# Patient Record
Sex: FEMALE | Race: WHITE | NOT HISPANIC OR LATINO | Employment: OTHER | URBAN - METROPOLITAN AREA
[De-identification: names, ages, dates, MRNs, and addresses within clinical notes are randomized per-mention and may not be internally consistent; named-entity substitution may affect disease eponyms.]

---

## 2017-01-16 ENCOUNTER — APPOINTMENT (EMERGENCY)
Dept: RADIOLOGY | Facility: HOSPITAL | Age: 62
End: 2017-01-16
Payer: COMMERCIAL

## 2017-01-16 ENCOUNTER — HOSPITAL ENCOUNTER (EMERGENCY)
Facility: HOSPITAL | Age: 62
Discharge: HOME | End: 2017-01-16
Attending: EMERGENCY MEDICINE | Admitting: EMERGENCY MEDICINE
Payer: COMMERCIAL

## 2017-01-16 ENCOUNTER — GENERIC CONVERSION - ENCOUNTER (OUTPATIENT)
Dept: OTHER | Facility: OTHER | Age: 62
End: 2017-01-16

## 2017-01-16 VITALS
RESPIRATION RATE: 14 BRPM | WEIGHT: 138 LBS | TEMPERATURE: 98.1 F | BODY MASS INDEX: 23.56 KG/M2 | DIASTOLIC BLOOD PRESSURE: 70 MMHG | OXYGEN SATURATION: 100 % | HEART RATE: 80 BPM | SYSTOLIC BLOOD PRESSURE: 150 MMHG | HEIGHT: 64 IN

## 2017-01-16 DIAGNOSIS — R00.2 PALPITATIONS: Primary | ICD-10-CM

## 2017-01-16 DIAGNOSIS — R00.2 PALPITATIONS: ICD-10-CM

## 2017-01-16 LAB
ALBUMIN SERPL BCP-MCNC: 4.1 G/DL (ref 3.5–5)
ALP SERPL-CCNC: 67 U/L (ref 46–116)
ALT SERPL W P-5'-P-CCNC: 29 U/L (ref 12–78)
ANION GAP SERPL CALCULATED.3IONS-SCNC: 5 MMOL/L (ref 4–13)
AST SERPL W P-5'-P-CCNC: 16 U/L (ref 5–45)
ATRIAL RATE: 77 BPM
BASOPHILS # BLD AUTO: 0.03 THOUSANDS/ΜL (ref 0–0.1)
BASOPHILS NFR BLD AUTO: 0 % (ref 0–1)
BILIRUB SERPL-MCNC: 0.5 MG/DL (ref 0.2–1)
BUN SERPL-MCNC: 12 MG/DL (ref 5–25)
CALCIUM SERPL-MCNC: 8.9 MG/DL (ref 8.3–10.1)
CHLORIDE SERPL-SCNC: 105 MMOL/L (ref 100–108)
CO2 SERPL-SCNC: 31 MMOL/L (ref 21–32)
CREAT SERPL-MCNC: 0.6 MG/DL (ref 0.6–1.3)
EOSINOPHIL # BLD AUTO: 0.04 THOUSAND/ΜL (ref 0–0.61)
EOSINOPHIL NFR BLD AUTO: 1 % (ref 0–6)
ERYTHROCYTE [DISTWIDTH] IN BLOOD BY AUTOMATED COUNT: 12.9 % (ref 11.6–15.1)
GFR SERPL CREATININE-BSD FRML MDRD: >60 ML/MIN/1.73SQ M
GLUCOSE SERPL-MCNC: 92 MG/DL (ref 65–140)
HCT VFR BLD AUTO: 41.2 % (ref 34.8–46.1)
HGB BLD-MCNC: 13.4 G/DL (ref 11.5–15.4)
LYMPHOCYTES # BLD AUTO: 2.02 THOUSANDS/ΜL (ref 0.6–4.47)
LYMPHOCYTES NFR BLD AUTO: 28 % (ref 14–44)
MAGNESIUM SERPL-MCNC: 1.9 MG/DL (ref 1.6–2.6)
MCH RBC QN AUTO: 30.8 PG (ref 26.8–34.3)
MCHC RBC AUTO-ENTMCNC: 32.5 G/DL (ref 31.4–37.4)
MCV RBC AUTO: 95 FL (ref 82–98)
MONOCYTES # BLD AUTO: 0.5 THOUSAND/ΜL (ref 0.17–1.22)
MONOCYTES NFR BLD AUTO: 7 % (ref 4–12)
NEUTROPHILS # BLD AUTO: 4.61 THOUSANDS/ΜL (ref 1.85–7.62)
NEUTS SEG NFR BLD AUTO: 64 % (ref 43–75)
P AXIS: 74 DEGREES
PLATELET # BLD AUTO: 225 THOUSANDS/UL (ref 149–390)
PMV BLD AUTO: 11.1 FL (ref 8.9–12.7)
POTASSIUM SERPL-SCNC: 4 MMOL/L (ref 3.5–5.3)
PR INTERVAL: 158 MS
PROT SERPL-MCNC: 7.3 G/DL (ref 6.4–8.2)
QRS AXIS: 51 DEGREES
QRSD INTERVAL: 72 MS
QT INTERVAL: 368 MS
QTC INTERVAL: 408 MS
RBC # BLD AUTO: 4.35 MILLION/UL (ref 3.81–5.12)
SODIUM SERPL-SCNC: 141 MMOL/L (ref 136–145)
T WAVE AXIS: 72 DEGREES
TROPONIN I SERPL-MCNC: <0.02 NG/ML
TSH SERPL DL<=0.05 MIU/L-ACNC: 1.91 UIU/ML (ref 0.36–3.74)
VENTRICULAR RATE: 74 BPM
WBC # BLD AUTO: 7.2 THOUSAND/UL (ref 4.31–10.16)

## 2017-01-16 PROCEDURE — 99285 EMERGENCY DEPT VISIT HI MDM: CPT

## 2017-01-16 PROCEDURE — 84443 ASSAY THYROID STIM HORMONE: CPT | Performed by: EMERGENCY MEDICINE

## 2017-01-16 PROCEDURE — 80053 COMPREHEN METABOLIC PANEL: CPT | Performed by: EMERGENCY MEDICINE

## 2017-01-16 PROCEDURE — 85025 COMPLETE CBC W/AUTO DIFF WBC: CPT | Performed by: EMERGENCY MEDICINE

## 2017-01-16 PROCEDURE — 84484 ASSAY OF TROPONIN QUANT: CPT | Performed by: EMERGENCY MEDICINE

## 2017-01-16 PROCEDURE — 71010 HB CHEST X-RAY 1 VIEW FRONTAL (PORTABLE): CPT

## 2017-01-16 PROCEDURE — 83735 ASSAY OF MAGNESIUM: CPT | Performed by: EMERGENCY MEDICINE

## 2017-01-16 PROCEDURE — 36415 COLL VENOUS BLD VENIPUNCTURE: CPT | Performed by: EMERGENCY MEDICINE

## 2017-01-16 PROCEDURE — 93005 ELECTROCARDIOGRAM TRACING: CPT | Performed by: EMERGENCY MEDICINE

## 2017-01-20 ENCOUNTER — HOSPITAL ENCOUNTER (OUTPATIENT)
Dept: NON INVASIVE DIAGNOSTICS | Facility: CLINIC | Age: 62
Discharge: HOME | End: 2017-01-20
Payer: COMMERCIAL

## 2017-01-20 DIAGNOSIS — R00.2 PALPITATIONS: ICD-10-CM

## 2017-01-20 PROCEDURE — 93226 XTRNL ECG REC<48 HR SCAN A/R: CPT

## 2017-01-20 PROCEDURE — 93225 XTRNL ECG REC<48 HRS REC: CPT

## 2017-01-24 ENCOUNTER — GENERIC CONVERSION - ENCOUNTER (OUTPATIENT)
Dept: OTHER | Facility: OTHER | Age: 62
End: 2017-01-24

## 2017-02-22 DIAGNOSIS — M79.606 PAIN OF LOWER EXTREMITY: ICD-10-CM

## 2017-02-22 DIAGNOSIS — Z00.00 ENCOUNTER FOR GENERAL ADULT MEDICAL EXAMINATION WITHOUT ABNORMAL FINDINGS: ICD-10-CM

## 2017-02-22 DIAGNOSIS — R00.2 PALPITATIONS: ICD-10-CM

## 2017-02-22 DIAGNOSIS — R53.83 OTHER FATIGUE: ICD-10-CM

## 2017-03-09 ENCOUNTER — GENERIC CONVERSION - ENCOUNTER (OUTPATIENT)
Dept: OTHER | Facility: OTHER | Age: 62
End: 2017-03-09

## 2017-03-10 ENCOUNTER — ALLSCRIPTS OFFICE VISIT (OUTPATIENT)
Dept: OTHER | Facility: OTHER | Age: 62
End: 2017-03-10

## 2017-03-10 ENCOUNTER — TRANSCRIBE ORDERS (OUTPATIENT)
Dept: ADMINISTRATIVE | Facility: HOSPITAL | Age: 62
End: 2017-03-10

## 2017-03-10 DIAGNOSIS — R00.2 PALPITATIONS: ICD-10-CM

## 2017-03-10 DIAGNOSIS — M79.606 PAIN OF LOWER EXTREMITY, UNSPECIFIED LATERALITY: ICD-10-CM

## 2017-03-10 DIAGNOSIS — R53.81 OTHER MALAISE AND FATIGUE: Primary | ICD-10-CM

## 2017-03-10 DIAGNOSIS — R53.83 OTHER MALAISE AND FATIGUE: Primary | ICD-10-CM

## 2017-03-10 DIAGNOSIS — Z00.00 ROUTINE GENERAL MEDICAL EXAMINATION AT A HEALTH CARE FACILITY: ICD-10-CM

## 2017-03-20 ENCOUNTER — GENERIC CONVERSION - ENCOUNTER (OUTPATIENT)
Dept: OTHER | Facility: OTHER | Age: 62
End: 2017-03-20

## 2017-03-20 LAB
AMBIG ABBREV CMP14 DEFAULT (HISTORICAL): NORMAL
AMBIG ABBREV LP DEFAULT (HISTORICAL): NORMAL

## 2017-03-21 LAB
A/G RATIO (HISTORICAL): 1.7 (ref 1.2–2.2)
ALBUMIN SERPL BCP-MCNC: 4.3 G/DL (ref 3.6–4.8)
ALP SERPL-CCNC: 71 IU/L (ref 39–117)
ALT SERPL W P-5'-P-CCNC: 18 IU/L (ref 0–32)
AST SERPL W P-5'-P-CCNC: 18 IU/L (ref 0–40)
BILIRUB SERPL-MCNC: 0.4 MG/DL (ref 0–1.2)
BUN SERPL-MCNC: 13 MG/DL (ref 8–27)
BUN/CREA RATIO (HISTORICAL): 20 (ref 11–26)
CALCIUM SERPL-MCNC: 9.8 MG/DL (ref 8.7–10.3)
CHLORIDE SERPL-SCNC: 102 MMOL/L (ref 96–106)
CHOLEST SERPL-MCNC: 212 MG/DL (ref 100–199)
CO2 SERPL-SCNC: 27 MMOL/L (ref 18–29)
CREAT SERPL-MCNC: 0.65 MG/DL (ref 0.57–1)
EGFR AFRICAN AMERICAN (HISTORICAL): 110 ML/MIN/1.73
EGFR-AMERICAN CALC (HISTORICAL): 95 ML/MIN/1.73
GLUCOSE SERPL-MCNC: 87 MG/DL (ref 65–99)
HDLC SERPL-MCNC: 57 MG/DL
LDLC SERPL CALC-MCNC: 136 MG/DL (ref 0–99)
POTASSIUM SERPL-SCNC: 4.5 MMOL/L (ref 3.5–5.2)
SODIUM SERPL-SCNC: 143 MMOL/L (ref 134–144)
TOT. GLOBULIN, SERUM (HISTORICAL): 2.5 G/DL (ref 1.5–4.5)
TOTAL PROTEIN (HISTORICAL): 6.8 G/DL (ref 6–8.5)
TRIGL SERPL-MCNC: 97 MG/DL (ref 0–149)
VLDLC SERPL CALC-MCNC: 19 MG/DL (ref 5–40)

## 2017-03-23 ENCOUNTER — HOSPITAL ENCOUNTER (OUTPATIENT)
Dept: NON INVASIVE DIAGNOSTICS | Facility: CLINIC | Age: 62
Discharge: HOME | End: 2017-03-23
Payer: COMMERCIAL

## 2017-03-23 ENCOUNTER — GENERIC CONVERSION - ENCOUNTER (OUTPATIENT)
Dept: OTHER | Facility: OTHER | Age: 62
End: 2017-03-23

## 2017-03-23 DIAGNOSIS — M79.606 PAIN OF LOWER EXTREMITY: ICD-10-CM

## 2017-03-23 DIAGNOSIS — R53.83 OTHER FATIGUE: ICD-10-CM

## 2017-03-23 DIAGNOSIS — Z00.00 ENCOUNTER FOR GENERAL ADULT MEDICAL EXAMINATION WITHOUT ABNORMAL FINDINGS: ICD-10-CM

## 2017-03-23 DIAGNOSIS — R00.2 PALPITATIONS: ICD-10-CM

## 2017-03-23 PROCEDURE — 93306 TTE W/DOPPLER COMPLETE: CPT

## 2017-03-23 PROCEDURE — 93923 UPR/LXTR ART STDY 3+ LVLS: CPT

## 2017-04-13 ENCOUNTER — ALLSCRIPTS OFFICE VISIT (OUTPATIENT)
Dept: OTHER | Facility: OTHER | Age: 62
End: 2017-04-13

## 2017-04-13 ENCOUNTER — GENERIC CONVERSION - ENCOUNTER (OUTPATIENT)
Dept: OTHER | Facility: OTHER | Age: 62
End: 2017-04-13

## 2017-04-20 LAB
ADEQUACY: (HISTORICAL): NORMAL
CLINICIAN PROVIDIED ICD 9 OR 10 (HISTORICAL): NORMAL
COMMENT (HISTORICAL): NORMAL
DIAGNOSIS (HISTORICAL): NORMAL
HPV HIGH RISK (HISTORICAL): NEGATIVE
NOTE: (HISTORICAL): NORMAL
PERFORMED BY (HISTORICAL): NORMAL
QC REVIEWED BY (HISTORICAL): NORMAL

## 2017-04-21 ENCOUNTER — GENERIC CONVERSION - ENCOUNTER (OUTPATIENT)
Dept: OTHER | Facility: OTHER | Age: 62
End: 2017-04-21

## 2017-06-15 ENCOUNTER — GENERIC CONVERSION - ENCOUNTER (OUTPATIENT)
Dept: OTHER | Facility: OTHER | Age: 62
End: 2017-06-15

## 2017-08-22 DIAGNOSIS — Z12.31 ENCOUNTER FOR SCREENING MAMMOGRAM FOR MALIGNANT NEOPLASM OF BREAST: ICD-10-CM

## 2017-08-25 ENCOUNTER — GENERIC CONVERSION - ENCOUNTER (OUTPATIENT)
Dept: OTHER | Facility: OTHER | Age: 62
End: 2017-08-25

## 2017-08-31 ENCOUNTER — GENERIC CONVERSION - ENCOUNTER (OUTPATIENT)
Dept: OTHER | Facility: OTHER | Age: 62
End: 2017-08-31

## 2018-01-10 NOTE — RESULT NOTES
Verified Results  HOLTER MONITOR - 24 HOUR [id number] 02:22PM Robyn Ya Order Number: [id number]    - Patient Instructions: To schedule this appointment, please contact Central Scheduling at [phone]  For Jean Carlos Alatorre, please call [phone]  Test Name Result Flag Reference   HOLTER MONITOR - 24 HOUR (Report)     PT NAME: Alesia Romero   : 1955 AGE: 57 y o  GENDER: female   MRN: [id number]  PROCEDURE: Holter monitor - 24 hour         INDICATIONS: Palpitations       FINDINGS:   1  A 24 hour holter monitor demonstrated normal sinus rhythm with an average rate of 75 BPM; a minimum rate of 46 BPM; and a maximum rate of 133 BPM    2  There were no ventricular ectopic beats  3  There were 85 supraventricular ectopic beats, the majority of which were premature atrial contractions  There were 3 atrial couplets, but no runs of supraventricular tachycardia  4  The longest R-R interval was 1 6 seconds  5  The patient kept a diary during holter monitor  It demonstrated frequent episodes of palpitations and fluttering that did not correlate to any dysrhythmia  1  Normal 24 hour holter monitor  2  Patient in normal sinus rhythm throughout holter monitoring  3  No premature ventricular contractions  4  Occasional premature atrial contractions with no supraventricular tachycardia  5  No significant pauses  6  Symptoms on diary did not correlate to any dysrhythmia

## 2018-01-11 NOTE — RESULT NOTES
Verified Results  (1924 [facility]) Pap IG, HPV-hr [id number] 12:00AM Elizabeth Larkin   ID-[id number]     Test Name Result Flag Reference   DIAGNOSIS: Comment     NEGATIVE FOR INTRAEPITHELIAL LESION AND MALIGNANCY  THIS SPECIMEN WAS RESCREENED AS PART OF OUR  PROGRAM    Specimen adequacy: Comment     Satisfactory for evaluation  Endocervical and/or squamous metaplastic  cells (endocervical component) are present  Clinician provided ICD10: Comment     Z01 419   Performed by: Alvin Flores, Cytotechnologist (ASCP)   QC reviewed by: Carley Miranda, Supervisory Cytotechnologist       Note: Comment     The Pap smear is a screening test designed to aid in the detection of  premalignant and malignant conditions of the uterine cervix  It is not a  diagnostic procedure and should not be used as the sole means of detecting  cervical cancer  Both false-positive and false-negative reports do occur  HPV, high-risk Negative  Negative   This high-risk HPV test detects thirteen high-risk types  (16/18/31/33/35/39/45/51/52/56/58/59/68) without differentiation

## 2018-01-11 NOTE — PROGRESS NOTES
Assessment    1  Encounter for cervical Pap smear with pelvic exam (V76 2,V72 31) (Z01 419)   2  Screening mammogram, encounter for (V76 12) (Z12 31)   3  Benign colon polyp (211 3) (K63 5)    Plan  Benign colon polyp    · COLONOSCOPY; Status:Active; Requested for:13Apr2017;   Encounter for cervical Pap smear with pelvic exam, Encounter for routine gynecological  examination with Papanicolaou smear of cervix, Periodic health assessment, Pap and  pelvic    · (1) CBC/PLT/DIFF; Status:Active; Requested for:[id number];   Encounter for routine gynecological examination with Papanicolaou smear of cervix    · (1) THIN PREP PAP WITH IMAGING; Status:Active; Requested for:13Apr2017; Maturation index required? : No  HPV? : Regardless of Interpretation  Encounter for routine gynecological examination with Papanicolaou smear of cervix,  Periodic health assessment, Pap and pelvic    · (1) VITAMIN D 25-HYDROXY; Status:Active; Requested for:13Feb2018; Health Maintenance    · (1) COMPREHENSIVE METABOLIC PANEL; Status:Active; Requested for:[id number];   Periodic health assessment, Pap and pelvic    · (1) LIPID PANEL, FASTING; Status:Active; Requested for:13Feb2018;   Periodic health assessment, Pap and pelvic, Screening mammogram, encounter for    · (1) TSH; Status:Active; Requested for:13Feb2018;   Screening mammogram, encounter for    · * MAMMO SCREENING BILATERAL W CAD; Status:Hold For - Scheduling; Requested  for:[id number];     Discussion/Summary  healthy adult female Currently, she eats a healthy diet and eats an adequate diet  cervical cancer screening is current Pap test with reflex HPV testing was done today Breast cancer screening: mammogram has been ordered  Colorectal cancer screening: colorectal cancer screening is current  Advice and education were given regarding weight bearing exercise, calcium supplements and vitamin D supplements  Patient discussion: discussed with the patient       LABS STABLE  MAMMO DUE 8/2017  PAP DONE TODAY- IF NEG OBATIN 3 YEARS   LABS IN 1 YEAR   COLONO DUE - ALST ONE 2008 - P[OLYP REMOVED- PT TO SCHEDULE  Chief Complaint  PATIENT HERE FOR ANNUAL PAP   SHE HAD SOME PALPITATIONS AND WENT TO ER- HAD FOLLOW UP WITH CARDIO- STABLE- NO FURTHER SYMTOMS  SHE IS DOING WELL  LIPIDS STABLE;      History of Present Illness  HM, Adult Female: The patient is being seen for a health maintenance and gynecology evaluation  Social History: Household members include spouse  She is   Work status: working full time  The patient has never smoked cigarettes  She reports never drinking alcohol  The patient has no concerns about alcohol abuse  General Health: The patient's health since the last visit is described as good  She has regular dental visits  She complains of vision problems  Vision care includes wearing glasses  Immunizations status: not up to date  Lifestyle:  She consumes a diverse and healthy diet  She does not have any weight concerns  She exercises regularly  She does not use tobacco  She denies alcohol use  Reproductive health: the patient is postmenopausal    Screening:   HPI: PATIENT HERE FOR ANNUAL PAP      Review of Systems    Constitutional: No fever, no chills, feels well, no tiredness, no recent weight gain or weight loss  Eyes: eyesight problems and WEARS GLASSES, but No complaints of eye pain, no red eyes, no eyesight problems, no discharge, no dry eyes, no itching of eyes and as noted in HPI    ENT: no complaints of earache, no loss of hearing, no nose bleeds, no nasal discharge, no sore throat, no hoarseness  Cardiovascular: No complaints of slow heart rate, no fast heart rate, no chest pain, no palpitations, no leg claudication, no lower extremity edema  Respiratory: No complaints of shortness of breath, no wheezing, no cough, no SOB on exertion, no orthopnea, no PND     Gastrointestinal: No complaints of abdominal pain, no constipation, no nausea or vomiting, no diarrhea, no bloody stools  Genitourinary: No complaints of dysuria, no incontinence, no pelvic pain, no dysmenorrhea, no vaginal discharge or bleeding  Musculoskeletal: No complaints of arthralgias, no myalgias, no joint swelling or stiffness, no limb pain or swelling  Integumentary: MULTIPLE NEVI, but No complaints of skin rash or lesions, no itching, no skin wounds, no breast pain or lump, as noted in HPI and no skin lesions  Neurological: No complaints of headache, no confusion, no convulsions, no numbness, no dizziness or fainting, no tingling, no limb weakness, no difficulty walking, no headache, no confusion and no convulsions  Psychiatric: Not suicidal, no sleep disturbance, no anxiety or depression, no change in personality, no emotional problems and no sleep disturbances  Endocrine: No complaints of proptosis, no hot flashes, no muscle weakness, no deepening of the voice, no feelings of weakness, no proptosis, no muscle weakness, no hot flashes and no deepening of the voice  Hematologic/Lymphatic: No complaints of swollen glands, no swollen glands in the neck, does not bleed easily, does not bruise easily, no tendency for easy bleeding and no tendency for easy bruising  ROS reviewed  Active Problems    1  Encounter for cervical Pap smear with pelvic exam (V76 2,V72 31) (Z01 419)   2  Encounter for routine gynecological examination with Papanicolaou smear of cervix   (V72 31,V76 2) (Z01 419)   3   Periodic health assessment, Pap and pelvic (V76 2,V72 31) (Z01 419)    Past Medical History    · Adjustment disorder with anxious mood (309 24) (F43 22)   · History of Degeneration of cervical intervertebral disc (722 4) (M50 90)   · History of dysfunctional uterine bleeding (V13 29) (Z87 42)   · History of essential hypertension (V12 59) (Z86 79)   · Menopause (627 2)   · History of PAC (premature atrial contraction) (427 61) (I49 1)   · History of Polyp of sigmoid colon (211 3) (D12 5)    Surgical History    · History of Tonsillectomy    Family History  Mother    · Family history of Coronary artery disease   · Family history of arthritis (V17 7) (Z82 61)   · Family history of atrial fibrillation (V17 49) (Z82 49)   · Family history of cerebrovascular accident (CVA) (V17 1) (Z82 3)   · Family history of diabetes mellitus (V18 0) (Z83 3)   · FH: colon cancer (V16 0) (Z80 0)  Father    · Family history of malignant neoplasm (V16 9) (Z80 9)   · FH: colon cancer (V16 0) (Z80 0)  Brother    · Family history of Coronary artery disease    Social History    · Exercise: Yoga   · Has 2 children   ·    · Never A Smoker   · Never smoked cigarettes (V49 89) (Z78 9)   · Pets in the home   · Social alcohol use (Z78 9)    Current Meds   1  Biotin CAPS; Therapy: (Recorded:13Apr2017) to Recorded   2  BL Vitamin B-12 TABS; Therapy: (Recorded:13Apr2017) to Recorded   3  Ferrous Sulfate 325 (65 Fe) MG Oral Tablet; Therapy: (Recorded:13Apr2017) to Recorded   4  Vitamin D3 1000 UNIT Oral Capsule; take 1 capsule daily; Therapy: (Recorded:84Whu3920) to Recorded    Allergies    1  No Known Drug Allergies    Vitals   Recorded: 13Apr2017 02:00PM   Temperature 97 8 F   Heart Rate 88   Respiration 16   Height 5 ft 3 in   Weight 136 lb    BMI Calculated 24 09   BSA Calculated 1 64   LMP [id number]     Physical Exam    Constitutional   General appearance: No acute distress, well appearing and well nourished  well developed, appears healthy, well nourished, within normal limits of ideal weight, well hydrated and appearance reflects stated age  Eyes   Conjunctiva and lids: No swelling, erythema or discharge  Pupils and irises: Equal, round, reactive to light  EOMI; PERRLA  Ears, Nose, Mouth, and Throat   External inspection of ears and nose: Normal     Otoscopic examination: Tympanic membranes translucent with normal light reflex  Canals patent without erythema      Hearing: Normal     Nasal mucosa, septum, and turbinates: Normal without edema or erythema  Lips, teeth, and gums: Normal, good dentition  Oropharynx: Normal with no erythema, edema, exudate or lesions  Inspection of the oropharynx showed fully visible tonsils, uvula and soft palate (Mallampati class 1)  The palate examination showed no abnormalities  The tongue was normal  The tonsils were normal    Neck   Neck: Supple, symmetric, trachea midline, no masses  Thyroid: Normal, no thyromegaly  Pulmonary   Respiratory effort: No increased work of breathing or signs of respiratory distress  Percussion of chest: Normal     Palpation of chest: Normal     Auscultation of lungs: Clear to auscultation  Cardiovascular   Palpation of heart: Normal PMI, no thrills  Auscultation of heart: Normal rate and rhythm, normal S1 and S2, no murmurs  The heart rate was normal  The rhythm was regular  Heart sounds: normal S1, normal S2, no S3 and no S4  no murmurs were heard  Carotid pulses: 2+ bilaterally  Abdominal aorta: Normal     Femoral pulses: 2+ bilaterally  Pedal pulses: 2+ bilaterally  Examination of extremities for edema and/or varicosities: Normal     Chest   Breasts: Normal, no dimpling or skin changes appreciated  Palpation of breasts and axillae: Normal, no masses palpated  Abdomen   Abdomen: Non-tender, no masses  Liver and spleen: No hepatomegaly or splenomegaly  Genitourinary   External genitalia and vagina: Normal, no lesions appreciated  Urethra: Normal, no discharge  Bladder: Abnormal   Palpation of the bladder revealed no abnormalities and no tenderness  GRADE 2 PROLAPSE  Cervix: Normal, no lesions  Uterus: Normal size, no tenderness, no masses  The uterus was normal, in a normal position and nontender  Adnexa/Parametria: Normal, no masses or tenderness  Lymphatic   Palpation of lymph nodes in neck: No lymphadenopathy  no posterior cervical node enlargement     Palpation of lymph nodes in axillae: No lymphadenopathy  no node enlargement  Palpation of lymph nodes in groin: No lymphadenopathy  Musculoskeletal   Gait and station: Normal     Digits and nails: Normal without clubbing or cyanosis  Joints, bones, and muscles: Normal     Range of motion: Normal     Stability: Normal     Muscle strength/tone: Normal     Skin   Skin and subcutaneous tissue: Normal without rashes or lesions  Palpation of skin and subcutaneous tissue: Normal turgor  Neurologic   Cranial nerves: Cranial nerves II-XII intact  Reflexes: 2+ and symmetric  Sensation: No sensory loss  Psychiatric   Judgment and insight: Normal     Orientation to person, place, and time: Normal     Recent and remote memory: Intact  Mood and affect: Normal        Results/Data  [facility] Risk [id number] 02:23PM Chevis Flock     Test Name Result Flag Reference   [facility] - Ten Year Risk of CHD 3 22 %     Sex: Female  Ethnicity: White/  Age: 57  Total Cholesterol: 212 mg/dL  HDL Cholesterol: 57 mg/dL  Systolic Blood Pressure: 163 mmHg  Blood Pressure Medication: No  Diabetes: No  Smoking Status: No   [facility] - Comparative Ten Year Risk of CHD 4 77 %       PHQ-2 Adult Depression Screening 13Apr2017 02:10PM Wade Felix     Test Name Result Flag Reference   PHQ-2 Adult Depression Score 0     Over the last two weeks, how often have you been bothered by any of the following problems? Little interest or pleasure in doing things: Not at all - 0  Feeling down, depressed, or hopeless: Not at all - 0   PHQ-2 Adult Depression Screening Negative         Future Appointments    Date/Time Provider Specialty Site   04/18/2018 04:00 PM [address], Antone Cowden, DO Internal Medicine [place] [address]   Electronically signed by : Serafin Maxwell DO;  Apr 13 2017  4:00PM EST                       (Author)

## 2018-01-12 VITALS
WEIGHT: 136 LBS | RESPIRATION RATE: 16 BRPM | HEIGHT: 63 IN | TEMPERATURE: 97.8 F | BODY MASS INDEX: 24.1 KG/M2 | HEART RATE: 88 BPM

## 2018-01-12 NOTE — RESULT NOTES
Verified Results  [facility]) Timmy Steen CMP14 Default [id number] 06:38AM Trudy Katharina     Test Name Result Flag Reference   Timmy Steen CMP14 Default Comment     A hand-written panel/profile was received from your office  In  accordance with the [facility] Ambiguous Test Code Policy dated July 7591, we have completed your order by using the closest currently  or formerly recognized AMA panel  We have assigned Comprehensive  Metabolic Panel (14), Test Code #002536 to this request   If this  is not the testing you wished to receive on this specimen, please  contact the [facility] Client Inquiry/Technical Services Department  to clarify the test order  We appreciate your business

## 2018-01-15 NOTE — RESULT NOTES
Message   Labs are all stable!! Verified Results  (1) COMPREHENSIVE METABOLIC PANEL [id number] 62:97PN Erinn [facility]   A courtesy copy of this report has been sent to  [facility], the patient  Test Name Result Flag Reference   Glucose, Serum 87 mg/dL  65-99   BUN 13 mg/dL  8-27   Creatinine, Serum 0 65 mg/dL  0 57-1 00   eGFR If NonAfricn Am 95 mL/min/1 73  >59   eGFR If Africn Am 110 mL/min/1 73  >59   BUN/Creatinine Ratio 20  11-26   Sodium, Serum 143 mmol/L  134-144   Potassium, Serum 4 5 mmol/L  3 5-5 2   Chloride, Serum 102 mmol/L     Carbon Dioxide, Total 27 mmol/L  18-29   Calcium, Serum 9 8 mg/dL  8 7-10 3   Protein, Total, Serum 6 8 g/dL  6 0-8 5   Albumin, Serum 4 3 g/dL  3 6-4 8   Globulin, Total 2 5 g/dL  1 5-4 5   A/G Ratio 1 7  1 2-2 2   **Please note reference interval change**   Bilirubin, Total 0 4 mg/dL  0 0-1 2   Alkaline Phosphatase, S 71 IU/L     AST (SGOT) 18 IU/L  0-40   ALT (SGPT) 18 IU/L  0-32     (LC) Lipid Panel [id number] 06:55AM Carloyn Chamber     Test Name Result Flag Reference   Cholesterol, Total 212 mg/dL H 100-199   Triglycerides 97 mg/dL  0-149   HDL Cholesterol 57 mg/dL  >39   VLDL Cholesterol Thomas 19 mg/dL  5-40   LDL Cholesterol Calc 136 mg/dL H 0-99     [facility]) Priyanka Pretty CMP14 Default [id number] 06:55AM Carloyn Chamber     Test Name Result Flag Reference   Priyanka Pretty CMP14 Default Comment     A hand-written panel/profile was received from your office  In  accordance with the [facility] Ambiguous Test Code Policy dated July 8352, we have completed your order by using the closest currently  or formerly recognized AMA panel  We have assigned Comprehensive  Metabolic Panel (14), Test Code #907874 to this request   If this  is not the testing you wished to receive on this specimen, please  contact the [facility] Client Inquiry/Technical Services Department  to clarify the test order  We appreciate your business       ([address] [place] [address]) [facility] Default [id number] 06:55AM Maritza Sanchez     Test Name Result Flag Reference   [facility] Default Comment     A hand-written panel/profile was received from your office  In  accordance with the [facility] Ambiguous Test Code Policy dated July 8950, we have completed your order by using the closest currently  or formerly recognized AMA panel  We have assigned Lipid Panel,  Test Code #937243 to this request  If this is not the testing you  wished to receive on this specimen, please contact the [address] [place] [address]  Client Inquiry/Technical Services Department to clarify the test  order  We appreciate your business

## 2018-01-16 NOTE — CONSULTS
Assessment    1  Palpitations (785 1) (R00 2)   2  Encounter for preventive health examination (V70 0) (Z00 00)   3  Fatigue (780 79) (R53 83)   4  Leg pain (729 5) (M79 606)    Plan  Health Maintenance    · EKG/ECG- POC; Status:Complete;   Done: [id number]   Perform: In Office; Due:10Mar2018; Last Updated Barb Le; 3/10/2017 10:11:51 AM;Ordered;  For:Health Maintenance; Ordered By:Adair Sanchez;  Health Maintenance, Fatigue, Leg pain, Palpitations    · ECHO COMPLETE WITH CONTRAST IF INDICATED; Status:Need Information - Financial  Authorization; Requested for:10Mar2017;    Perform:[facility]; Due:10Mar2018; Last Updated By:CHARBEL Garcia; 3/10/2017 10:42:26 AM;Ordered;  For:Health Maintenance, Fatigue, Leg pain, Palpitations; Ordered By:Adair Sanchez;   · VAS EMMANUEL & WAVEFORM ANALYSIS, MULTIPLE LEVELS; Status:Active; Requested  for:10Mar2017;    Perform:[facility]; Due:10Mar2018; Last Updated By:CHARBEL Garcia; 3/10/2017 10:42:26 AM;Ordered;  For:Health Maintenance, Fatigue, Leg pain, Palpitations; Ordered By:Adair Sanchez;   · Follow-up visit in 6 months Evaluation and Treatment  Follow-up  Status: Complete   Done: 40MZO0856   Ordered; For: Health Maintenance, Fatigue, Leg pain, Palpitations; Ordered By: Jese Mittal Performed:  Due: 26LFM4351; Last Updated By: Anderson Dover; 3/10/2017 10:35:38 AM    Discussion/Summary    The palpitations seem to be occurring infrequently  I've recommended that she increase her hydration  I told her if they come back to call me and I will give her an event recorder to keep at home for a couple weeks  I would like to start with a transthoracic echocardiogram to rule out any underlying structural heart disease  I've also ordered an ankle-brachial index due to the complaints of loss of hair in the legs and lower extremity cramping  We'll rule out any vascular component  Blood pressure is well controlled   Reviewed her lipid panel from last year she does not need statin medications based on those numbers  She will be having repeat blood work this year and we'll forward me the results  Chief Complaint  Patient here today for Cardiac Consult  Patient c/o Occ Palpations  EKG today  History of Present Illness  Cardiology HPI Free Text Note Form [facility]: Mrs Lonzell Goldmann Is a very pleasant 14-year-old female with no significant past medical history who presents today for new patient consultation on behalf of Dr Krishna Marin for a complaint of palpitations  She has had on and off palpitations for at least a year  She had an episode about 6 weeks ago where she felt a rapid fluttering in her chest  She was evaluated in the emergency arm and no abnormalities were found  She also had a Holter monitor which showed occasional PACs but nothing correlated with her symptoms  She has not had any further palpitations since that event  There is been no syncope  She is a good functional capacity for age with no exertional symptoms  There's been no chest pain, shortness of breath, lower extremity edema, PND, orthopnea  She does not drink much alcohol and there is no caffeine use  She probably does not drink enough fluids today for general hydration  There is also a complaint of loss of hair in the lower legs and a family history of circulation problems  She has had some occasional cramping in her lower legs  Review of Systems      Cardiac: palpitations present , but No complaints of chest pain, no palpitations, no fainting  Skin: No complaints of nonhealing sores or skin rash  Genitourinary: No complaints of recurrent urinary tract infections, frequent urination at night, difficult urination, blood in urine, kidney stones, loss of bladder control, kidney problems, denies any birth control or hormone replacement, is not post menopausal, not currently pregnant     Psychological: No complaints of feeling depressed, anxiety, panic attacks, or difficulty concentrating  General: No complaints of trouble sleeping, lack of energy, fatigue, appetite changes, weight changes, fever, frequent infections, or night sweats  Respiratory: No complaints of shortness of breath, cough with sputum, or wheezing  HEENT: No complaints of serious problems, hearing problems, nose problems, throat problems, or snoring  Gastrointestinal: No complaints of liver problems, nausea, vomiting, heartburn, constipation, bloody stools, diarrhea, problems swallowing, adbominal pain, or rectal bleeding  Hematologic: No complaints of bleeding disorders, anemia, blood clots, or excessive brusing  Neurological: No complaints of numbness, tingling, dizziness, weakness, seizures, headaches, syncope or fainting, AM fatigue, daytime sleepiness, no witnessed apnea episodes  Musculoskeletal: No complaints of arthritis, back pain, or painfull swelling  Active Problems    1  Cervical cancer screening (V76 2) (Z12 4)   2  Encounter for cervical Pap smear with pelvic exam (V76 2,V72 31) (Z01 419)   3  Encounter for screening mammogram for breast cancer (V76 12) (Z12 31)   4  Fatigue (780 79) (R53 83)   5  Left elbow pain (719 42) (M25 522)   6  Palpitations (785 1) (R00 2)   7  Periodic health assessment, Pap and pelvic (V76 2,V72 31) (Z01 419)   8  Wellness examination (V70 0) (Z00 00)    Past Medical History    · Adjustment disorder with anxious mood (309 24) (F43 22)   · History of Degeneration of cervical intervertebral disc (722 4) (M50 90)   · History of dysfunctional uterine bleeding (V13 29) (Z87 42)   · History of essential hypertension (V12 59) (Z86 79)   · Menopause (627 2)   · History of PAC (premature atrial contraction) (427 61) (I49 1)   · History of Polyp of sigmoid colon (211 3) (D12 5)    The active problems and past medical history were reviewed and updated today        Surgical History    · History of Tonsillectomy    The surgical history was reviewed and updated today  Family History  Mother    · Family history of atrial fibrillation (V17 49) (Z82 49)   · FH: colon cancer (V16 0) (Z80 0)  Father    · FH: colon cancer (V16 0) (Z80 0)  Family History Reviewed: The family history was reviewed and updated today  Social History    · Never A Smoker  The social history was reviewed and updated today  Current Meds   1  Naproxen  MG Oral Tablet Delayed Release; TAKE 1 TABLET EVERY 12 HOURS   DAILY; Therapy: 77CHM4220 to (Evaluate:95Cfj2497)  Requested for: 39Gip7627; Last   Rx:[id number] Ordered   2  Vitamin D3 1000 UNIT Oral Capsule; take 1 capsule daily; Therapy: (Recorded:68Nof4748) to Recorded    Allergies    1  No Known Drug Allergies    Vitals  Signs    Heart Rate: 72  Systolic: 589, LUE, Sitting  Diastolic: 64, LUE, Sitting  Height: 5 ft 3 in  Weight: 135 lb 4 oz  BMI Calculated: 23 96  BSA Calculated: 1 64    Physical Exam    Constitutional   General appearance: No acute distress, well appearing and well nourished  Eyes   Conjunctiva and Sclera examination: Conjunctiva pink, sclera anicteric  Ears, Nose, Mouth, and Throat - Oropharynx: Clear, nares are clear, mucous membranes are moist    Neck   Neck and thyroid: Normal, supple, trachea midline, no thyromegaly  Pulmonary   Respiratory effort: No increased work of breathing or signs of respiratory distress  Auscultation of lungs: Clear to auscultation, no rales, no rhonchi, no wheezing, good air movement  Cardiovascular   Auscultation of heart: Normal rate and rhythm, normal S1 and S2, no murmurs  Carotid pulses: Normal, 2+ bilaterally  Peripheral vascular exam: Normal pulses throughout, no tenderness, erythema or swelling  Pedal pulses: Normal, 2+ bilaterally  Examination of extremities for edema and/or varicosities: Normal     Abdomen   Abdomen: Non-tender and no distention  Liver and spleen: No hepatomegaly or splenomegaly      Musculoskeletal Gait and station: Normal gait  Digits and nails: Normal without clubbing or cyanosis  Inspection/palpation of joints, bones, and muscles: Normal, ROM normal     Skin - Skin and subcutaneous tissue: Normal without rashes or lesions  Skin is warm and well perfused, normal turgor  Neurologic - Cranial nerves: II - XII intact  Speech: Normal     Psychiatric - Orientation to person, place, and time: Normal  Mood and affect: Normal       Results/Data  Normal sinus rhythm left atrial enlargement      Future Appointments    Date/Time Provider Specialty Site   04/13/2017 02:00 PM Neo Carroll DO Internal Medicine [address]     End of Encounter Meds    1  Vitamin D3 1000 UNIT Oral Capsule; take 1 capsule daily; Therapy: (Recorded:33Pze2595) to Recorded    2  Naproxen  MG Oral Tablet Delayed Release; TAKE 1 TABLET EVERY 12 HOURS   DAILY;    Therapy: 74PLV6044 to (Evaluate:14Ool4980)  Requested for: 47Uqk3212; Last   Rx:[id number] Ordered    Signatures   Electronically signed by : Jagdish Rodriguez DO; Mar 10 2017 11:32AM EST                       (Author)

## 2018-01-22 VITALS
BODY MASS INDEX: 23.96 KG/M2 | DIASTOLIC BLOOD PRESSURE: 64 MMHG | HEIGHT: 63 IN | SYSTOLIC BLOOD PRESSURE: 124 MMHG | HEART RATE: 72 BPM | WEIGHT: 135.25 LBS

## 2018-02-13 DIAGNOSIS — Z00.00 ENCOUNTER FOR GENERAL ADULT MEDICAL EXAMINATION WITHOUT ABNORMAL FINDINGS: ICD-10-CM

## 2018-02-13 DIAGNOSIS — Z12.31 ENCOUNTER FOR SCREENING MAMMOGRAM FOR MALIGNANT NEOPLASM OF BREAST: ICD-10-CM

## 2018-02-13 DIAGNOSIS — Z01.419 ENCOUNTER FOR GYNECOLOGICAL EXAMINATION WITHOUT ABNORMAL FINDING: ICD-10-CM

## 2018-04-05 LAB
25(OH)D3 SERPL-MCNC: 48 NG/ML (ref 30–100)
ALBUMIN SERPL-MCNC: 4.6 G/DL (ref 3.6–5.1)
ALBUMIN/GLOB SERPL: 1.8 (CALC) (ref 1–2.5)
ALP SERPL-CCNC: 80 U/L (ref 33–130)
ALT SERPL-CCNC: 17 U/L (ref 6–29)
AST SERPL-CCNC: 17 U/L (ref 10–35)
BASOPHILS # BLD AUTO: 46 CELLS/UL (ref 0–200)
BASOPHILS NFR BLD AUTO: 0.5 %
BILIRUB SERPL-MCNC: 0.8 MG/DL (ref 0.2–1.2)
BUN SERPL-MCNC: 11 MG/DL (ref 7–25)
BUN/CREAT SERPL: NORMAL (CALC) (ref 6–22)
CALCIUM SERPL-MCNC: 9.5 MG/DL (ref 8.6–10.4)
CHLORIDE SERPL-SCNC: 101 MMOL/L (ref 98–110)
CHOLEST SERPL-MCNC: 239 MG/DL
CHOLEST/HDLC SERPL: 4.5 (CALC)
CO2 SERPL-SCNC: 30 MMOL/L (ref 20–31)
CREAT SERPL-MCNC: 0.72 MG/DL (ref 0.5–0.99)
EOSINOPHIL # BLD AUTO: 137 CELLS/UL (ref 15–500)
EOSINOPHIL NFR BLD AUTO: 1.5 %
ERYTHROCYTE [DISTWIDTH] IN BLOOD BY AUTOMATED COUNT: 12.9 % (ref 11–15)
GLOBULIN SER CALC-MCNC: 2.5 G/DL (CALC) (ref 1.9–3.7)
GLUCOSE SERPL-MCNC: 94 MG/DL (ref 65–99)
HCT VFR BLD AUTO: 43.8 % (ref 35–45)
HDLC SERPL-MCNC: 53 MG/DL
HGB BLD-MCNC: 14.6 G/DL (ref 11.7–15.5)
LDLC SERPL CALC-MCNC: 163 MG/DL (CALC)
LYMPHOCYTES # BLD AUTO: 2175 CELLS/UL (ref 850–3900)
LYMPHOCYTES NFR BLD AUTO: 23.9 %
MCH RBC QN AUTO: 31.8 PG (ref 27–33)
MCHC RBC AUTO-ENTMCNC: 33.3 G/DL (ref 32–36)
MCV RBC AUTO: 95.4 FL (ref 80–100)
MONOCYTES # BLD AUTO: 764 CELLS/UL (ref 200–950)
MONOCYTES NFR BLD AUTO: 8.4 %
NEUTROPHILS # BLD AUTO: 5979 CELLS/UL (ref 1500–7800)
NEUTROPHILS NFR BLD AUTO: 65.7 %
NONHDLC SERPL-MCNC: 186 MG/DL (CALC)
PLATELET # BLD AUTO: 247 THOUSAND/UL (ref 140–400)
PMV BLD REES-ECKER: 11.6 FL (ref 7.5–12.5)
POTASSIUM SERPL-SCNC: 4.2 MMOL/L (ref 3.5–5.3)
PROT SERPL-MCNC: 7.1 G/DL (ref 6.1–8.1)
RBC # BLD AUTO: 4.59 MILLION/UL (ref 3.8–5.1)
SL AMB EGFR AFRICAN AMERICAN: 103 ML/MIN/1.73M2
SL AMB EGFR NON AFRICAN AMERICAN: 89 ML/MIN/1.73M2
SODIUM SERPL-SCNC: 139 MMOL/L (ref 135–146)
TRIGL SERPL-MCNC: 116 MG/DL
TSH SERPL-ACNC: 2.17 MIU/L (ref 0.4–4.5)
WBC # BLD AUTO: 9.1 THOUSAND/UL (ref 3.8–10.8)

## 2018-04-18 ENCOUNTER — OFFICE VISIT (OUTPATIENT)
Dept: FAMILY MEDICINE CLINIC | Facility: CLINIC | Age: 63
End: 2018-04-18
Payer: COMMERCIAL

## 2018-04-18 VITALS
SYSTOLIC BLOOD PRESSURE: 122 MMHG | HEART RATE: 76 BPM | WEIGHT: 144.2 LBS | BODY MASS INDEX: 24.03 KG/M2 | HEIGHT: 65 IN | TEMPERATURE: 98.7 F | DIASTOLIC BLOOD PRESSURE: 76 MMHG | RESPIRATION RATE: 16 BRPM

## 2018-04-18 DIAGNOSIS — Z00.00 PREVENTATIVE HEALTH CARE: Primary | ICD-10-CM

## 2018-04-18 DIAGNOSIS — R92.2 DENSE BREAST TISSUE ON MAMMOGRAM: ICD-10-CM

## 2018-04-18 DIAGNOSIS — E78.01 FAMILIAL HYPERCHOLESTEROLEMIA: ICD-10-CM

## 2018-04-18 DIAGNOSIS — Z12.31 BREAST CANCER SCREENING BY MAMMOGRAM: ICD-10-CM

## 2018-04-18 PROCEDURE — 99396 PREV VISIT EST AGE 40-64: CPT | Performed by: INTERNAL MEDICINE

## 2018-04-18 RX ORDER — FERROUS SULFATE 325(65) MG
1 TABLET ORAL 2 TIMES WEEKLY
COMMUNITY

## 2018-04-18 RX ORDER — CHOLECALCIFEROL (VITAMIN D3) 25 MCG
1 CAPSULE ORAL DAILY
COMMUNITY
End: 2020-09-11 | Stop reason: ALTCHOICE

## 2018-04-18 NOTE — PROGRESS NOTES
ASSESSMENT and PLAN:  Gianni Miller is a 60 y o  female with:   Problem List Items Addressed This Visit     Preventative health care - Primary    Breast cancer screening by mammogram    Relevant Orders    Mammo screening bilateral w 3d & cad    Dense breast tissue on mammogram    Relevant Orders    Mammo screening bilateral w 3d & cad          SUBJECTIVE:  Gianni Miller is a 60 y o  female who presents today with a chief complaint of Physical Exam    Patient here for annual exam  Her last colono was 6/15/17 dr Elissa Quevedo- due in 2022- previolsy done in 2006 and 2008    mammo 8/31/17- dense breasts on mammogram  She has itchy spot on back- then had abnormal sensation in that area- now resolved; Review of Systems   Constitutional: Negative  HENT: Negative  Eyes: Negative  Respiratory: Negative  Cardiovascular: Negative  Gastrointestinal: Negative  Endocrine: Negative  Genitourinary: Negative  Musculoskeletal: Negative  Skin: Negative  Allergic/Immunologic: Negative  Neurological: Negative  Hematological: Negative  Psychiatric/Behavioral: Negative  I have reviewed the patient's PMH, Social History, Medication List and Allergies  OBJECTIVE:  /76 (BP Location: Left arm, Patient Position: Sitting, Cuff Size: Large)   Pulse 76   Temp 98 7 °F (37 1 °C) (Tympanic)   Resp 16   Ht 5' 4 5" (1 638 m)   Wt 65 4 kg (144 lb 3 2 oz)   BMI 24 37 kg/m²   Physical Exam   Constitutional: She is oriented to person, place, and time  She appears well-developed and well-nourished  HENT:   Head: Normocephalic and atraumatic  Right Ear: External ear normal    Left Ear: External ear normal    Nose: Nose normal    Mouth/Throat: Oropharynx is clear and moist    Eyes: Conjunctivae and EOM are normal  Pupils are equal, round, and reactive to light  Right eye exhibits no discharge  Left eye exhibits no discharge  Neck: Normal range of motion  Neck supple   No JVD present  No tracheal deviation present  No thyromegaly present  Cardiovascular: Normal rate, regular rhythm, normal heart sounds and intact distal pulses  Pulmonary/Chest: Effort normal and breath sounds normal  No respiratory distress  She has no wheezes  She has no rales  She exhibits no tenderness  Abdominal: Soft  Bowel sounds are normal    Musculoskeletal: Normal range of motion  She exhibits no edema, tenderness or deformity  Lymphadenopathy:     She has no cervical adenopathy  Neurological: She is alert and oriented to person, place, and time  She has normal reflexes  No cranial nerve deficit  She exhibits normal muscle tone  Coordination normal    Skin: Skin is warm and dry  No rash noted  No erythema  Psychiatric: She has a normal mood and affect  Her behavior is normal  Judgment and thought content normal    Nursing note and vitals reviewed

## 2018-04-18 NOTE — PATIENT INSTRUCTIONS
Add fish oil ( omega 3 and 6) keep in fridge - 1 per day (1000mg)  Labs in 6 months  Get mammogram - 3d  colono up to date

## 2018-04-24 ENCOUNTER — TELEPHONE (OUTPATIENT)
Dept: FAMILY MEDICINE CLINIC | Facility: CLINIC | Age: 63
End: 2018-04-24

## 2018-04-24 NOTE — TELEPHONE ENCOUNTER
Patient called  At her recent visit you recommended she take omega 3&6   She cannot find 3 and 6 together  She found 3's and a pill that was 3,6, and 9  She states the pills for the 3,6, and 9 are to big and she is unable to take those   She wants to know what you want her to do, or if you know where she can get the 3 and 6 combo

## 2018-04-25 NOTE — TELEPHONE ENCOUNTER
Maninder Hauser made is a brand of fish oil- it is called "triple omega" - usually at [address]  And usually a buy one get one free situation    That is easiest combo

## 2018-04-26 NOTE — TELEPHONE ENCOUNTER
PATIENT SAID THE QUESTION WAS THE PILLS ARE TOO BIG  SHE CAN ONLY FIND 3'S THAT ARE SMALL ENOUGH FOR HER TO SWALLOW  SHE WILL KEEP LOOKING FOR A SMALLER 3 & 6 COMBO IN THE SMALLER SIZE  BUT IF SHE IS UNABLE TO FIND THEM ARE THE 3'S SUFFICIENT? PATIENT SAID TO LEAVE DETAILED MESSAGE ON HER VOICEMAIL WITH RESPONSE

## 2018-11-30 ENCOUNTER — OFFICE VISIT (OUTPATIENT)
Dept: FAMILY MEDICINE CLINIC | Facility: CLINIC | Age: 63
End: 2018-11-30
Payer: COMMERCIAL

## 2018-11-30 VITALS
RESPIRATION RATE: 16 BRPM | WEIGHT: 144 LBS | HEART RATE: 88 BPM | TEMPERATURE: 99.2 F | SYSTOLIC BLOOD PRESSURE: 124 MMHG | HEIGHT: 65 IN | DIASTOLIC BLOOD PRESSURE: 68 MMHG | BODY MASS INDEX: 23.99 KG/M2

## 2018-11-30 DIAGNOSIS — J01.00 ACUTE NON-RECURRENT MAXILLARY SINUSITIS: Primary | ICD-10-CM

## 2018-11-30 DIAGNOSIS — M75.41 IMPINGEMENT SYNDROME OF RIGHT SHOULDER: ICD-10-CM

## 2018-11-30 PROBLEM — Z00.00 PREVENTATIVE HEALTH CARE: Status: RESOLVED | Noted: 2018-04-18 | Resolved: 2018-11-30

## 2018-11-30 PROBLEM — K63.5 BENIGN COLON POLYP: Status: ACTIVE | Noted: 2017-04-13

## 2018-11-30 PROBLEM — H43.819 VITREOUS DETACHMENT: Status: ACTIVE | Noted: 2017-08-25

## 2018-11-30 PROCEDURE — 1036F TOBACCO NON-USER: CPT | Performed by: FAMILY MEDICINE

## 2018-11-30 PROCEDURE — 3008F BODY MASS INDEX DOCD: CPT | Performed by: FAMILY MEDICINE

## 2018-11-30 PROCEDURE — 99213 OFFICE O/P EST LOW 20 MIN: CPT | Performed by: FAMILY MEDICINE

## 2018-11-30 RX ORDER — AZITHROMYCIN 250 MG/1
TABLET, FILM COATED ORAL
Qty: 6 TABLET | Refills: 0 | Status: SHIPPED | OUTPATIENT
Start: 2018-11-30 | End: 2018-12-04

## 2018-11-30 NOTE — PROGRESS NOTES
Assessment/Plan:     Diagnoses and all orders for this visit:    Acute non-recurrent maxillary sinusitis  -     azithromycin (ZITHROMAX) 250 mg tablet; 2 tablets day 1  1 tablet daily for days 2 through 5    Impingement syndrome of right shoulder  -     Ambulatory referral to Physical Therapy; Future        Symptom treatment for nasal congestion  Referral for PT for right shoulder pain  She was offered a steroid injection she declined  Prn Aleve for pain      Patient ID: Lonnie Casiano is a 58 y o  female  Patient presents with several month history of recurrent right shoulder pain  right handed  No history of trauma  Pain worse with certain movements  Pain worse at nighttime  On no pain medications         The following portions of the patient's history were reviewed and updated as appropriate: allergies, current medications, past family history, past medical history, past social history, past surgical history and problem list     Review of Systems   Constitutional: Positive for fever  HENT: Positive for congestion and rhinorrhea (Yellow rhinorrhea)  Negative for ear pain and sore throat  One-week history of persistent nasal congestion with sinus pressure  She has been using prn DayQuil and Neti Pot  Respiratory: Negative for cough, shortness of breath and wheezing  Gastrointestinal: Negative for diarrhea, nausea and vomiting  Skin: Negative for rash  Neurological: Negative for headaches  Hematological: Negative for adenopathy  Objective:      /68   Pulse 88   Temp 99 2 °F (37 3 °C)   Resp 16   Ht 5' 4 5" (1 638 m)   Wt 65 3 kg (144 lb)   BMI 24 34 kg/m²          Physical Exam   Constitutional: She is oriented to person, place, and time  She appears well-developed and well-nourished  No distress  HENT:   Right Ear: Tympanic membrane normal    Left Ear: Tympanic membrane normal    Nose: Right sinus exhibits maxillary sinus tenderness   Left sinus exhibits maxillary sinus tenderness  Mouth/Throat: No oral lesions  No posterior oropharyngeal erythema  Eyes: Conjunctivae are normal  No scleral icterus  Neck: No tracheal deviation present  No thyromegaly present  Cardiovascular: Normal rate, regular rhythm and normal heart sounds  Exam reveals no gallop  No murmur heard  Pulmonary/Chest: Effort normal and breath sounds normal  No respiratory distress  She has no wheezes  She has no rales  Abdominal: Soft  Bowel sounds are normal    Musculoskeletal:        Right shoulder: She exhibits decreased range of motion  She exhibits no tenderness, no bony tenderness, no swelling, no effusion, no crepitus, normal pulse and normal strength  Mild decrease in range of motion with internal rotation  Positive impingement sign  Negative cross-arm test negative sulcus sign  Lymphadenopathy:     She has no cervical adenopathy  Neurological: She is alert and oriented to person, place, and time  Skin: No rash noted  Nursing note and vitals reviewed

## 2018-12-05 ENCOUNTER — EVALUATION (OUTPATIENT)
Dept: PHYSICAL THERAPY | Facility: CLINIC | Age: 63
End: 2018-12-05
Payer: COMMERCIAL

## 2018-12-05 DIAGNOSIS — M75.41 IMPINGEMENT SYNDROME OF RIGHT SHOULDER: Primary | ICD-10-CM

## 2018-12-05 PROCEDURE — G8990 OTHER PT/OT CURRENT STATUS: HCPCS | Performed by: PHYSICAL THERAPIST

## 2018-12-05 PROCEDURE — 97161 PT EVAL LOW COMPLEX 20 MIN: CPT | Performed by: PHYSICAL THERAPIST

## 2018-12-05 PROCEDURE — G8991 OTHER PT/OT GOAL STATUS: HCPCS | Performed by: PHYSICAL THERAPIST

## 2018-12-05 PROCEDURE — 97110 THERAPEUTIC EXERCISES: CPT | Performed by: PHYSICAL THERAPIST

## 2018-12-05 NOTE — PROGRESS NOTES
PT Evaluation     Today's date: 2018  Patient name: Brad Razo  : 1955  MRN: [id number]  Referring provider: Deandre Rodriguez MD  Dx:   Encounter Diagnosis     ICD-10-CM    1  Impingement syndrome of right shoulder M75 41 Ambulatory referral to Physical Therapy                  Assessment  Assessment details: Brad Razo is a 58 y o  Female who presents with right shoulder pain and signs and symptoms consistent with impingement  She has decreased shoulder ROM, decreased strength, poor posture, UE tightness, and decreased function  Patient would benefit from skilled physical therapy in order to address said deficits and improve functional mobility  Impairments: abnormal or restricted ROM, abnormal movement, impaired physical strength, lacks appropriate home exercise program, pain with function, poor posture  and poor body mechanics  Understanding of Dx/Px/POC: good   Prognosis: good    Goals  STG:  independent with HEP  Increase shoulder ROM > 15 degrees     LTG:  Improve pec tightness to mild  Decrease max pain to < 3/10  Return to yoga unrestricted    Plan  Patient would benefit from: skilled physical therapy  Planned therapy interventions: joint mobilization, manual therapy, neuromuscular re-education, stretching, strengthening, therapeutic activities, therapeutic exercise, flexibility, patient education, postural training and home exercise program  Frequency: 2x week  Duration in visits: 4  Treatment plan discussed with: patient        Subjective Evaluation    History of Present Illness  Mechanism of injury: Patient reports pain began around September  She notes she does yoga and notes she may have over stretched  Increased difficulty sleeping and washing back,  Difficulty putting coat on/off, putting on t shirts   No difficulty driving though some pain using arm to erase board( 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)   Pain  Current pain ratin  At worst pain ratin  Quality: sharp  Aggravating factors: lifting      Diagnostic Tests  No diagnostic tests performed  Patient Goals  Patient goal: return to yoga non restricted  Objective     Postural Observations    Additional Postural Observation Details  Significant rounded shoulder on R, mild winging scap on right     Tenderness     Right Shoulder  Tenderness in the subscapularis tendon  Additional Tenderness Details  Min tenderness upon palpation today     Active Range of Motion   Left Shoulder   Normal active range of motion    Right Shoulder   Flexion: 145 degrees with pain  Abduction: 110 degrees with pain  External rotation 90°: 58 degreeswith pain  Internal rotation 90°: 50 degrees with pain    Strength/Myotome Testing     Left Shoulder     Planes of Motion   Flexion: 5   Abduction: 5   External rotation at 0°: 4+   Internal rotation at 0°: 5     Right Shoulder     Planes of Motion   Flexion: 5   Abduction: 5   External rotation at 0°: 4   Internal rotation at 0°: 5     Tests     Right Shoulder   Positive belly press, empty can, Hawkin's and painful arc  Negative full can and Speed's         Flowsheet Rows      Most Recent Value   PT/OT G-Codes   Current Score  54   Projected Score  68   Assessment Type  Evaluation   G code set  Other PT/OT Primary   Other PT Primary Current Status ()  CK   Other PT Primary Goal Status ()  CJ          Precautions: None      Daily Treatment Diary       Modalities  12/5            HP NV                                          Manual  12/5            Shoulder PROM NV            R sided pec release NV                                                       Exercise Diary  12/5            pulleys NV            Door pec stretch NV            Seated t/s extension NV            Cane flexion supine 10x            Cane abd standing 10x            Cane ER NV            Cane IR behind back slides 10x            TB IR NV            TB ER NV            TB rows NV            TB ext NV Tb high rows NV            Supine punches NV            Side lying ER NV            TB horizontal abd NV

## 2018-12-10 ENCOUNTER — OFFICE VISIT (OUTPATIENT)
Dept: PHYSICAL THERAPY | Facility: CLINIC | Age: 63
End: 2018-12-10
Payer: COMMERCIAL

## 2018-12-10 DIAGNOSIS — M75.41 IMPINGEMENT SYNDROME OF RIGHT SHOULDER: Primary | ICD-10-CM

## 2018-12-10 PROCEDURE — 97110 THERAPEUTIC EXERCISES: CPT

## 2018-12-10 PROCEDURE — 97140 MANUAL THERAPY 1/> REGIONS: CPT

## 2018-12-10 NOTE — PROGRESS NOTES
Daily Note     Today's date: 12/10/2018  Patient name: Esa Simons  : 1955  MRN: [id number]  Referring provider: Vale Ordoñez MD  Dx:   Encounter Diagnosis     ICD-10-CM    1  Impingement syndrome of right shoulder M75 41                   Subjective: Pt states compliance w/ current HEP  Objective: See treatment diary below    Precautions: None        Daily Treatment Diary         Modalities  12/5  12/10                   HP NV  5'                                                                         Manual  12/5  12/10                   Shoulder PROM NV  10'                   R sided pec release NV  nv                                                                                                 Exercise Diary  12/5  12/10                   pulleys NV  2' flex                   Door pec stretch NV  15"x1                   Seated t/s extension NV  nv                   Cane flexion supine 10x  np                   Cane abd standing 10x  np                   Cane ER NV  nv                   Cane IR behind back slides 10x  np                   TB IR NV GTB 2x10                   TB ER NV GTB 2x10                   TB rows NV GTB 2x10                   TB ext NV GTB 2x10                   Tb high rows NV  nv                   Supine punches NV 2x10                   Side lying ER NV  nv                   TB horizontal abd NV  nv                                                                                                                                                                                                Assessment: Tolerated treatment well  Patient would benefit from continued PT  Increased discomfort noted w/ end range flexion and ER (passive)  Pt given updated copy of HEP as well as GTB for home  No pain noted w/ TE performed today  Plan: Progress treatment as tolerated

## 2018-12-12 ENCOUNTER — APPOINTMENT (OUTPATIENT)
Dept: PHYSICAL THERAPY | Facility: CLINIC | Age: 63
End: 2018-12-12
Payer: COMMERCIAL

## 2018-12-13 ENCOUNTER — TELEPHONE (OUTPATIENT)
Dept: FAMILY MEDICINE CLINIC | Facility: CLINIC | Age: 63
End: 2018-12-13

## 2018-12-13 DIAGNOSIS — J01.00 ACUTE NON-RECURRENT MAXILLARY SINUSITIS: Primary | ICD-10-CM

## 2018-12-13 RX ORDER — AMOXICILLIN AND CLAVULANATE POTASSIUM 875; 125 MG/1; MG/1
1 TABLET, FILM COATED ORAL
Qty: 14 TABLET | Refills: 0 | Status: SHIPPED | OUTPATIENT
Start: 2018-12-13 | End: 2018-12-20

## 2018-12-13 NOTE — TELEPHONE ENCOUNTER
Patient was seen recently for a sinus infection and got a zpack  She finished it a week ago and was starting to feel better but now she feels like congestion is moving down in her chest with a heaviness when she takes a breath  Can something else be ordered? Please advise      [address] [facility] [phone]

## 2018-12-17 ENCOUNTER — OFFICE VISIT (OUTPATIENT)
Dept: PHYSICAL THERAPY | Facility: CLINIC | Age: 63
End: 2018-12-17
Payer: COMMERCIAL

## 2018-12-17 DIAGNOSIS — M75.41 IMPINGEMENT SYNDROME OF RIGHT SHOULDER: Primary | ICD-10-CM

## 2018-12-17 PROCEDURE — 97140 MANUAL THERAPY 1/> REGIONS: CPT | Performed by: PHYSICAL THERAPIST

## 2018-12-17 PROCEDURE — 97110 THERAPEUTIC EXERCISES: CPT | Performed by: PHYSICAL THERAPIST

## 2018-12-17 NOTE — PROGRESS NOTES
Daily Note     Today's date: 2018  Patient name: Leigh Li  : 1955  MRN: [id number]  Referring provider: Kinsey Martinez MD  Dx:   Encounter Diagnosis     ICD-10-CM    1  Impingement syndrome of right shoulder M75 41                    Subjective: Patient states she is feeling better than she was  She notes she has still been compliant with HEP  Objective: See treatment diary below    Precautions: None        Daily Treatment Diary         Modalities  12/5  12/10  12/17                 HP NV  5'  np                                                                       Manual  12/5  12/10  12/17                 Shoulder PROM NV  10'  10'                 R sided pec release NV  nv                    scap PROM     5'                                                                       Exercise Diary  12/5  12/10  12/17                 pulleys NV  2' flex                   Door pec stretch NV  15"x1                   Seated t/s extension NV  nv                   Cane flexion supine 10x  np  3# on cane  10x3'                 Cane abd standing 10x  np                   Cane ER NV  nv 12x5"                  Cane IR behind back slides 10x  np  np                 TB IR NV GTB 2x10                   TB ER NV GTB 2x10                   TB rows NV GTB 2x10                   TB ext NV GTB 2x10                   Tb high rows NV  nv  G 2x10                  Supine punches NV 2x10                   Side lying ER NV  nv  2# 2x10                  TB horizontal abd NV  nv  G 2x10                  trial TB D2 flexion/extension      NV                                                                                                                                                                    8 mins no charge today   Assessment: Tolerated treatment well  Patient would benefit from continued PT  Patient with good tolerance to session today   She had no complaints with added exercises and demonstrates good scapular control  Patient progressing well at this time  She wishes to go down to only 1 visit/week  Plan: Progress treatment as tolerated

## 2018-12-19 ENCOUNTER — APPOINTMENT (OUTPATIENT)
Dept: PHYSICAL THERAPY | Facility: CLINIC | Age: 63
End: 2018-12-19
Payer: COMMERCIAL

## 2018-12-26 ENCOUNTER — OFFICE VISIT (OUTPATIENT)
Dept: PHYSICAL THERAPY | Facility: CLINIC | Age: 63
End: 2018-12-26
Payer: COMMERCIAL

## 2018-12-26 DIAGNOSIS — M75.41 IMPINGEMENT SYNDROME OF RIGHT SHOULDER: Primary | ICD-10-CM

## 2018-12-26 PROCEDURE — 97140 MANUAL THERAPY 1/> REGIONS: CPT

## 2018-12-26 PROCEDURE — 97110 THERAPEUTIC EXERCISES: CPT

## 2018-12-26 NOTE — PROGRESS NOTES
Daily Note     Today's date: 2018  Patient name: Kayden Boggs  : 1955  MRN: [id number]  Referring provider: Alejandro Suarez MD  Dx:   Encounter Diagnosis     ICD-10-CM    1  Impingement syndrome of right shoulder M75 41                   Subjective: Pt states her shdr has been more flexible, noting an easier time putting her jacket on as well as being able to put her hands on her hips more comfortably  Objective: See treatment diary below    Precautions: None        Daily Treatment Diary         Modalities  12/5  12/10  12/17  12/26               HP NV  5'  np  np                                                                     Manual  12/5  12/10  12/17  12/26               Shoulder PROM NV  10'  10'  15'               R sided pec release NV  nv                    scap PROM     5'                                                                       Exercise Diary  12/5  12/10  12/17  12/26               pulleys NV  2' flex    2' flex               Door pec stretch NV  15"x1                   Seated t/s extension NV  nv                   Cane flexion supine 10x  np  3# on cane  10x3'  3# 10x3"               Cane abd standing 10x  np                   Cane ER NV  nv 12x5"  review               Cane IR behind back slides 10x  np  np                 TB IR NV GTB 2x10   GTB 2x10               TB ER NV GTB 2x10    GTB 2x10               TB rows NV GTB 2x10                   TB ext NV GTB 2x10                   Tb high rows NV  nv  G 2x10   review               Supine punches NV 2x10   review               Side lying ER NV  nv  2# 2x10  2# 2x10               TB horizontal abd NV  nv  G 2x10 review                trial TB D2 flexion/extension      NV RTB 15 ea                                                                                                                                                                    Assessment: Tolerated treatment well   Patient would benefit from continued PT   Pt challenged by addition of D2 flex/ext w/ TB  Pt needs cues for proper technique w/ ER exercises  Tolerates PROM well  Plan: Progress treatment as tolerated

## 2018-12-28 ENCOUNTER — APPOINTMENT (OUTPATIENT)
Dept: PHYSICAL THERAPY | Facility: CLINIC | Age: 63
End: 2018-12-28
Payer: COMMERCIAL

## 2019-01-02 ENCOUNTER — OFFICE VISIT (OUTPATIENT)
Dept: PHYSICAL THERAPY | Facility: CLINIC | Age: 64
End: 2019-01-02
Payer: COMMERCIAL

## 2019-01-02 DIAGNOSIS — M75.41 IMPINGEMENT SYNDROME OF RIGHT SHOULDER: Primary | ICD-10-CM

## 2019-01-02 PROCEDURE — 97110 THERAPEUTIC EXERCISES: CPT

## 2019-01-02 PROCEDURE — G8990 OTHER PT/OT CURRENT STATUS: HCPCS | Performed by: PHYSICAL THERAPIST

## 2019-01-02 PROCEDURE — G8991 OTHER PT/OT GOAL STATUS: HCPCS | Performed by: PHYSICAL THERAPIST

## 2019-01-02 PROCEDURE — 97140 MANUAL THERAPY 1/> REGIONS: CPT

## 2019-01-02 NOTE — PROGRESS NOTES
Daily Note     Today's date: 2019  Patient name: Kev Majano  : 1955  MRN: [id number]  Referring provider: Annie Esparza MD  Dx:   Encounter Diagnosis     ICD-10-CM    1  Impingement syndrome of right shoulder M75 41                   Subjective: Pt states compliance w/ current HEP  Objective: See treatment diary below  Precautions: None        Daily Treatment Diary         Modalities  12/5  12/10  12/17  12/26  12             HP NV  5'  np  np                                                                     Manual  12/5  12/10  12/17  12/26  12             Shoulder PROM NV  10'  10'  15'  15'             R sided pec release NV  nv                    scap PROM     5'    5'                                                                   Exercise Diary  12/5  12/10  12/17  12/26  12             pulleys NV  2' flex    2' flex               Door pec stretch NV  15"x1                   Seated t/s extension NV  nv                   Cane flexion supine 10x  np  3# on cane  10x3'  3# 10x3"  3# 10x3"             Cane abd standing 10x  np                   Cane ER NV  nv 12x5"  review               Cane IR behind back slides 10x  np  np                 TB IR NV GTB 2x10   GTB 2x10               TB ER NV GTB 2x10    GTB 2x10               TB rows NV GTB 2x10                   TB ext NV GTB 2x10                   Tb high rows NV  nv  G 2x10   review               Supine punches NV 2x10   review               Side lying ER NV  nv  2# 2x10  2# 2x10  2#  3x10             TB horizontal abd NV  nv  G 2x10 review                trial TB D2 flexion/extension      NV RTB 15 ea                                       Incline bench T,Y, I         10x3" ea                                                                                                                       Assessment: Tolerated treatment well  Patient would benefit from continued PT  Added T,Y, I on incline bench and updated HEP as well    IR most limited w/ PROM  See RE for additional details  Plan: Progress treatment as tolerated

## 2019-01-02 NOTE — PROGRESS NOTES
PT Re-Evaluation     Today's date: 2019  Patient name: Jason Marroquin  : 1955  MRN: [id number]  Referring provider: Zoya Mcdermott MD  Dx:   Encounter Diagnosis     ICD-10-CM    1  Impingement syndrome of right shoulder M75 41                   Assessment  Assessment details: Jason Marroquin is a 58 y o  Female who  Has made significant progression since her evaluation  Patient continues to have mild limitations in shoulder ROM and strength, as well as overall function  Patient would benefit from continued physical therapy in order to address said deficits and improve functional mobliity  Impairments: abnormal or restricted ROM, abnormal movement, impaired physical strength, lacks appropriate home exercise program, pain with function, poor posture  and poor body mechanics  Understanding of Dx/Px/POC: good   Prognosis: good    Goals  STG:  independent with HEP-met  Increase shoulder ROM > 15 degrees -met    LTG:  Improve pec tightness to mild-partially met  Decrease max pain to < 3/10-partially met   Return to yoga unrestricted-partially met     Plan  Patient would benefit from: skilled physical therapy  Planned therapy interventions: joint mobilization, manual therapy, neuromuscular re-education, stretching, strengthening, therapeutic activities, therapeutic exercise, flexibility, patient education, postural training and home exercise program  Frequency: 2x week  Duration in visits: 4  Treatment plan discussed with: patient        Subjective Evaluation    History of Present Illness  Mechanism of injury: Abbey reports she notes significant improvement since her evaluation  She notes she is able to take coat on/off, decreased difficulty getting tshirts on/off, she uses a shower wash her back        Pain  Current pain ratin  At worst pain ratin  Quality: sharp  Aggravating factors: lifting      Diagnostic Tests  No diagnostic tests performed  Patient Goals  Patient goal: return to yoga non restricted  Objective     Postural Observations    Additional Postural Observation Details  Significant rounded shoulder on R, mild winging scap on right     Tenderness     Right Shoulder  Tenderness in the subscapularis tendon  Additional Tenderness Details  Min tenderness upon palpation today     Active Range of Motion   Left Shoulder   Normal active range of motion    Right Shoulder   Flexion: 166 degrees with pain  Abduction: 135 degrees with pain  External rotation 90°: 76 degreeswith pain  Internal rotation 90°: 54 degrees with pain    Strength/Myotome Testing     Left Shoulder     Planes of Motion   Flexion: 5   Abduction: 5   External rotation at 0°: 4+   Internal rotation at 0°: 5     Right Shoulder     Planes of Motion   Flexion: 5   Abduction: 5   External rotation at 0°: 4+   Internal rotation at 0°: 5     Tests     Right Shoulder   Positive belly press, Hawkin's and painful arc  Negative full can and Speed's         Flowsheet Rows      Most Recent Value   PT/OT G-Codes   Current Score  71   Projected Score  68   Assessment Type  Re-evaluation   G code set  Other PT/OT Primary   Other PT Primary Current Status ()  CJ   Other PT Primary Goal Status ()  CJ          Precautions: None      Daily Treatment Diary

## 2019-01-09 ENCOUNTER — OFFICE VISIT (OUTPATIENT)
Dept: PHYSICAL THERAPY | Facility: CLINIC | Age: 64
End: 2019-01-09
Payer: COMMERCIAL

## 2019-01-09 DIAGNOSIS — M75.41 IMPINGEMENT SYNDROME OF RIGHT SHOULDER: Primary | ICD-10-CM

## 2019-01-09 PROCEDURE — 97140 MANUAL THERAPY 1/> REGIONS: CPT

## 2019-01-09 PROCEDURE — 97110 THERAPEUTIC EXERCISES: CPT

## 2019-01-09 NOTE — PROGRESS NOTES
Daily Note     Today's date: 2019  Patient name: Navin Marx  : 1955  MRN: [id number]  Referring provider: Anupama Atkinson MD  Dx:   Encounter Diagnosis     ICD-10-CM    1  Impingement syndrome of right shoulder M75 41                   Subjective: Pt states she is able to sleep on an outstretched arm comfortably but has been experiencing occasional biceps discomfort  Pt feels her L shdr blade has been "winging" less        Objective: See treatment diary below  Precautions: None        Daily Treatment Diary         Modalities  12/5  12/10  12/17  12/26  1/2             HP NV  5'  np  np                                                                     Manual  12/5  12/10  12/17  12/26  1/2  1/9           Shoulder PROM NV  10'  10'  15'  15'  15'           R sided pec release NV  nv                    scap PROM     5'    5'  5'                                                                 Exercise Diary  12/5  12/10  12/17  12/26  1/2  1/9           pulleys NV  2' flex    2' flex    2' flex           Door pec stretch NV  15"x1                   Seated t/s extension NV  nv                   Cane flexion supine 10x  np  3# on cane  10x3'  3# 10x3"  3# 10x3"  3# 10x3'           Cane abd standing 10x  np                   Cane ER NV  nv 12x5"  review               Cane IR behind back slides 10x  np  np                 TB IR NV GTB 2x10   GTB 2x10               TB ER NV GTB 2x10    GTB 2x10               TB rows NV GTB 2x10                   TB ext NV GTB 2x10                   Tb high rows NV  nv  G 2x10   review               Supine punches NV 2x10   review               Side lying ER NV  nv  2# 2x10  2# 2x10  2#  3x10 2# 3x10           TB horizontal abd NV  nv  G 2x10 review                trial TB D2 flexion/extension      NV RTB 15 ea                                       Incline bench T,Y, I          10x3" ea Bent at table 15x ea            shdr flex/scap            1# 2x10 ea                                                                                          Assessment: Tolerated treatment well  Patient exhibited good technique with therapeutic exercises  No sig ttp of biceps tendon or muscle belly noted  Pt challenged by addition of scaption  Performed modified TYI because pt does not have an incline bench at home  Plan: Progress treatment as tolerated

## 2019-01-16 ENCOUNTER — OFFICE VISIT (OUTPATIENT)
Dept: PHYSICAL THERAPY | Facility: CLINIC | Age: 64
End: 2019-01-16
Payer: COMMERCIAL

## 2019-01-16 DIAGNOSIS — M75.41 IMPINGEMENT SYNDROME OF RIGHT SHOULDER: Primary | ICD-10-CM

## 2019-01-16 PROCEDURE — 97110 THERAPEUTIC EXERCISES: CPT

## 2019-01-16 PROCEDURE — 97140 MANUAL THERAPY 1/> REGIONS: CPT

## 2019-01-16 NOTE — PROGRESS NOTES
Daily Note     Today's date: 2019  Patient name: Preeti Judd  : 1955  MRN: [id number]  Referring provider: Scott Madsen MD  Dx:   Encounter Diagnosis     ICD-10-CM    1  Impingement syndrome of right shoulder M75 41                   Subjective: Pt states she is able to put her R hand on her hip now  Also states she has adjusted her routine at home, where she is not performing every exercise everyday , and is feeling much better        Objective: See treatment diary below  Precautions: None        Daily Treatment Diary         Modalities  12/5  12/10  12/17  12/26  1/2             HP NV  5'  np  np                                                                     Manual  12/5  12/10  12/17  12/26  1/2  1/9  1/16         Shoulder PROM NV  10'  10'  15'  15'  15'  15'         R sided pec release NV  nv                    scap PROM     5'    5'  5'  np                                                               Exercise Diary  12/5  12/10  12/17  12/26  1/2  1/9  1/16         pulleys NV  2' flex    2' flex    2' flex  2' flex         Door pec stretch NV  15"x1                   Seated t/s extension NV  nv                   Cane flexion supine 10x  np  3# on cane  10x3'  3# 10x3"  3# 10x3"  3# 10x3'  3# 10x5"         Cane abd standing 10x  np                   Cane ER NV  nv 12x5"  review               Cane IR behind back slides 10x  np  np                 TB IR NV GTB 2x10   GTB 2x10               TB ER NV GTB 2x10    GTB 2x10               TB rows NV GTB 2x10                   TB ext NV GTB 2x10                   Tb high rows NV  nv  G 2x10   review               Supine punches NV 2x10   review               Side lying ER NV  nv  2# 2x10  2# 2x10  2#  3x10 2# 3x10           TB horizontal abd NV  nv  G 2x10 review                trial TB D2 flexion/extension      NV RTB 15 ea                                       Incline bench T,Y, I          10x3" ea Bent at table 15x ea  2# 15x ea        shdr flex/scap            1# 2x10 ea  2# 2x10 ea         Prone scap retraction (elbow straight)              10x          IR strap stretch             review                                        Assessment: Tolerated treatment well  Patient exhibited good technique with therapeutic exercises  Added IR strap stretch to HEP  No sig discomfort noted w/ end range PROM  Plan: Progress treatment as tolerated

## 2019-01-23 ENCOUNTER — OFFICE VISIT (OUTPATIENT)
Dept: PHYSICAL THERAPY | Facility: CLINIC | Age: 64
End: 2019-01-23
Payer: COMMERCIAL

## 2019-01-23 DIAGNOSIS — M75.41 IMPINGEMENT SYNDROME OF RIGHT SHOULDER: Primary | ICD-10-CM

## 2019-01-23 PROCEDURE — 97140 MANUAL THERAPY 1/> REGIONS: CPT | Performed by: PHYSICAL THERAPIST

## 2019-01-23 PROCEDURE — 97110 THERAPEUTIC EXERCISES: CPT | Performed by: PHYSICAL THERAPIST

## 2019-01-23 NOTE — PROGRESS NOTES
Daily Note     Today's date: 2019  Patient name: Lonnie Casiano  : 1955  MRN: [id number]  Referring provider: Lucy Pitt MD  Dx:   Encounter Diagnosis     ICD-10-CM    1  Impingement syndrome of right shoulder M75 41                   Subjective: Pt states she is able to put her R hand on her hip now  Also states she has adjusted her routine at home, where she is not performing every exercise everyday , and is feeling much better        Objective: See treatment diary below  Precautions: None        Daily Treatment Diary         Modalities  12/5  12/10  12/17  12/26  1/2            HP NV  5'  np  np                                                                     Manual  12/5  12/10  12/17  12/26  1/2  1/9  1/16  1/23       Shoulder PROM NV  10'  10'  15'  15'  15'  15'  10'       R sided pec release NV  nv            2'        scap PROM     5'    5'  5'  np  4'                                                             Exercise Diary  12/5  12/10  12/17  12/26  1/2  1/9  1/16  1/23       UBE        2f/2b     pulleys NV  2' flex    2' flex    2' flex  2' flex  dc       Door pec stretch NV  15"x1                   Seated t/s extension NV  nv                   Cane flexion supine 10x  np  3# on cane  10x3'  3# 10x3"  3# 10x3"  3# 10x3'  3# 10x5"  3# 10x5"       Cane abd standing 10x  np                   Cane ER NV  nv 12x5"  review               Cane IR behind back slides 10x  np  np                 TB IR NV GTB 2x10   GTB 2x10               TB ER NV GTB 2x10    GTB 2x10               TB rows NV GTB 2x10                   TB ext NV GTB 2x10                   Tb high rows NV  nv  G 2x10   review               Supine punches NV 2x10   review               Side lying ER NV  nv  2# 2x10  2# 2x10  2#  3x10 2# 3x10           TB horizontal abd NV  nv  G 2x10 review                trial TB D2 flexion/extension      NV RTB 15 ea                spine 90/90 ER stretch               2#10x10"       Incline bench T,Y, I          10x3" ea Bent at table 15x ea  2# 15x ea          shdr flex/scap            1# 2x10 ea  2# 2x10 ea         Prone scap retraction (elbow straight)              10x          IR strap stretch             review          shoulder overhead press                 2# 2x10        Push ups with pluss         x10     Seated t/s extension         10x5"             Assessment: Tolerated treatment well  Patient exhibited good technique with therapeutic exercises  Patient has good for mwith all exercises  She has improved shoulder ER noted after ER 90/90 stretch  Patient feels comfortable with added exercises and current HEP and would like to be d/c with HEP at this time       Plan: d/c with HEP

## 2019-01-29 ENCOUNTER — OFFICE VISIT (OUTPATIENT)
Dept: FAMILY MEDICINE CLINIC | Facility: CLINIC | Age: 64
End: 2019-01-29
Payer: COMMERCIAL

## 2019-01-29 VITALS
TEMPERATURE: 97.6 F | RESPIRATION RATE: 16 BRPM | WEIGHT: 147 LBS | BODY MASS INDEX: 24.49 KG/M2 | HEART RATE: 84 BPM | HEIGHT: 65 IN | DIASTOLIC BLOOD PRESSURE: 70 MMHG | SYSTOLIC BLOOD PRESSURE: 124 MMHG

## 2019-01-29 DIAGNOSIS — J04.0 LARYNGITIS, ACUTE: ICD-10-CM

## 2019-01-29 DIAGNOSIS — J06.9 VIRAL UPPER RESPIRATORY TRACT INFECTION: Primary | ICD-10-CM

## 2019-01-29 PROCEDURE — 99213 OFFICE O/P EST LOW 20 MIN: CPT | Performed by: NURSE PRACTITIONER

## 2019-01-29 NOTE — PROGRESS NOTES
Assessment/Plan:         Problem List Items Addressed This Visit     None      Visit Diagnoses     Viral upper respiratory tract infection        Laryngitis, acute      --Advise rest, fluids, herbal teas/lozenges, Motrin, OTC decongestant, saline nasal spray, humidifier    --Call for no resolution/worsening over the next 3-5 days  Subjective:      Patient ID: Adelita Sandra is a 58 y o  female  Here with complaints of loss of voice, non-productive cough, nasal congestion, clear rhinorrhea x 3 days  Low-grade "fever" (99 6)  No chills, headaches, body aches  No ear pain  Normal appetite  No N/V/D, abdominal pain  Numerous potential sick contacts--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[employer], 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)  Taking Dayquil  Non-smoker  No underlying asthma/breathing issues  No flu shot--"makes me sick"          The following portions of the patient's history were reviewed and updated as appropriate: current medications, past family history, past medical history, past social history, past surgical history and problem list     Review of Systems   Constitutional: Positive for fever  HENT: Positive for rhinorrhea and sore throat  Negative for ear pain  Eyes: Negative for discharge  Respiratory: Positive for cough  Gastrointestinal: Negative for abdominal pain, diarrhea, nausea and vomiting  Musculoskeletal: Negative for myalgias  Neurological: Negative for headaches  Objective:      /70   Pulse 84   Temp 97 6 °F (36 4 °C)   Resp 16   Ht 5' 5 2" (1 656 m)   Wt 66 7 kg (147 lb)   BMI 24 31 kg/m²          Physical Exam   Constitutional: She is oriented to person, place, and time  She appears well-developed and well-nourished  HENT:   Head: Normocephalic  Right Ear: External ear normal    Left Ear: External ear normal    Mouth/Throat: Oropharynx is clear and moist  No oropharyngeal exudate  Turbinates swollen, erythematous  No sinus tenderness  Eyes: Pupils are equal, round, and reactive to light  Conjunctivae are normal    Neck: Normal range of motion  Neck supple  Cardiovascular: Normal rate, regular rhythm and normal heart sounds  Pulmonary/Chest: Effort normal and breath sounds normal    Abdominal: Soft  Bowel sounds are normal  There is no tenderness  Lymphadenopathy:     She has no cervical adenopathy  Neurological: She is alert and oriented to person, place, and time  She has normal reflexes  Skin: Skin is warm and dry  Psychiatric: She has a normal mood and affect

## 2019-01-29 NOTE — PATIENT INSTRUCTIONS
Upper Respiratory Infection, Ambulatory Care   GENERAL INFORMATION:   An upper respiratory infection  is also called a common cold  It can affect your nose, throat, ears, and sinuses  Common symptoms include the following:   · Runny or stuffy nose    · Sneezing and coughing    · Sore throat or hoarseness    · Red, watery, and sore eyes    · Tiredness or restlessness    · Chills and fever    · Headache, body aches, or sore muscles  Seek immediate care for the following symptoms:   · Headaches or a stiff neck    · Bright lights hurt your eyes    · Chest pain or trouble breathing  Treatment for an upper respiratory infection  may include any of the following:  · Decongestants  help decrease nasal congestion and improve your breathing  Do not use decongestant sprays for more than a few days  · Cough suppressants  help decrease coughing  Ask your healthcare provider which type of cough medicine is best for you  Some cough medicines need a doctor's order  · NSAIDs  help decrease swelling and pain or fever  This medicine is available with or without a doctor's order  NSAIDs can cause stomach bleeding or kidney problems in certain people  If you take blood thinner medicine, always ask your healthcare provider if NSAIDs are safe for you  Always read the medicine label and follow directions  Care for an upper respiratory infection:   · Rest  until your fever is gone or you feel better  · Drink liquids as directed to prevent dehydration  You may need to drink 8 to 10 cups of liquid each day  Good liquids to drink include water, ginger ale, tea, or fruit juices  · Gargle  with warm salt water to help your sore throat feel better  Mix ¼ teaspoon salt with 1 cup warm water  You may also suck on hard candy or throat lozenges  · Saline nasal drops  help loosen your nasal congestion  They can be bought without a doctor's order      · Take a warm bath or shower  to help decrease body aches and help you breathe easier  · Use a cool-mist humidifier  to increase air moisture and make it easier for you to breathe  Prevent the spread of germs:   · Avoid others for the first 2 to 3 days of your cold  Germs are easily spread during this time  · Do not share food, drinks,  towels, or personal items with others  · Wash your hands often  Use soap and water  Wash your hands after you use the bathroom, change a child's diapers, or sneeze  Wash your hands before you prepare or eat food  Cover your mouth and nose with a tissue when you sneeze or cough  Follow up with your healthcare provider as directed:  Write down your questions so you remember to ask them during your visits  CARE AGREEMENT:   You have the right to help plan your care  Learn about your health condition and how it may be treated  Discuss treatment options with your caregivers to decide what care you want to receive  You always have the right to refuse treatment  The above information is an  only  It is not intended as medical advice for individual conditions or treatments  Talk to your doctor, nurse or pharmacist before following any medical regimen to see if it is safe and effective for you  © 2014 [address] is for End User's use only and may not be sold, redistributed or otherwise used for commercial purposes  All illustrations and images included in CareNotes® are the copyrighted property of [employer]  or [employer]

## 2019-01-30 ENCOUNTER — APPOINTMENT (OUTPATIENT)
Dept: PHYSICAL THERAPY | Facility: CLINIC | Age: 64
End: 2019-01-30
Payer: COMMERCIAL

## 2019-01-30 ENCOUNTER — TELEPHONE (OUTPATIENT)
Dept: FAMILY MEDICINE CLINIC | Facility: CLINIC | Age: 64
End: 2019-01-30

## 2019-01-30 DIAGNOSIS — J01.00 ACUTE NON-RECURRENT MAXILLARY SINUSITIS: Primary | ICD-10-CM

## 2019-01-30 RX ORDER — AZITHROMYCIN 250 MG/1
TABLET, FILM COATED ORAL
Qty: 6 TABLET | Refills: 0 | Status: SHIPPED | OUTPATIENT
Start: 2019-01-30 | End: 2019-02-03

## 2019-01-30 NOTE — TELEPHONE ENCOUNTER
Patient called stating she was in for an apt yesterday and  she feels worse and is having sinus pressure  She also stated she developed pink eye in her left eye  Patient would like to know if anything can be sent in for her  Please advise   [phone]

## 2019-03-11 ENCOUNTER — OFFICE VISIT (OUTPATIENT)
Dept: FAMILY MEDICINE CLINIC | Facility: CLINIC | Age: 64
End: 2019-03-11
Payer: COMMERCIAL

## 2019-03-11 VITALS
HEART RATE: 98 BPM | HEIGHT: 65 IN | RESPIRATION RATE: 16 BRPM | TEMPERATURE: 97.9 F | BODY MASS INDEX: 24.19 KG/M2 | OXYGEN SATURATION: 98 % | SYSTOLIC BLOOD PRESSURE: 110 MMHG | WEIGHT: 145.2 LBS | DIASTOLIC BLOOD PRESSURE: 60 MMHG

## 2019-03-11 DIAGNOSIS — R05.8 POST-VIRAL COUGH SYNDROME: Primary | ICD-10-CM

## 2019-03-11 PROCEDURE — 1036F TOBACCO NON-USER: CPT | Performed by: NURSE PRACTITIONER

## 2019-03-11 PROCEDURE — 3008F BODY MASS INDEX DOCD: CPT | Performed by: NURSE PRACTITIONER

## 2019-03-11 PROCEDURE — 99213 OFFICE O/P EST LOW 20 MIN: CPT | Performed by: NURSE PRACTITIONER

## 2019-03-11 RX ORDER — BENZONATATE 100 MG/1
100 CAPSULE ORAL 3 TIMES DAILY PRN
Qty: 20 CAPSULE | Refills: 0 | Status: SHIPPED | OUTPATIENT
Start: 2019-03-11 | End: 2019-03-16

## 2019-03-11 NOTE — PROGRESS NOTES
Assessment/Plan:     Diagnoses and all orders for this visit:    Post-viral cough syndrome  -     benzonatate (TESSALON PERLES) 100 mg capsule; Take 1 capsule (100 mg total) by mouth 3 (three) times a day as needed for cough for up to 5 days        Discussed with patient plan to treat with benzonatate 100 mg three times a day as needed for coughing  Patient instructed to call if no improvement in 72 hours or symptoms worsen      Subjective:      Patient ID: Kev Majano is a 58 y o  female  58 y  o female presenting with cough for the past month  She was seen in the office on 01/29/19 for a viral upper respiratory infection  She called into the office on 01/30/19 with worsening symptoms and sinus pressure so she was prescribed azithromycin for a maxillary sinusitis  She reports that all symptoms have resolved but she as been left with a dry non-productive cough  She denies fever, chills or generalized body aches being associated with the cough  She as tried over the counter medications without resolution          Family History   Problem Relation Age of Onset    Coronary artery disease Mother     Arthritis Mother     Atrial fibrillation Mother     Stroke Mother         cerebrovascular accident   Benito Polio Diabetes Mother     Colon cancer Mother    Benito Polio Cancer Father     Colon cancer Father     Coronary aneurysm Brother     Coronary artery disease Brother      Social History     Socioeconomic History    Marital status: /Civil Union     Spouse name: Not on file    Number of children: 2    Years of education: Not on file    Highest education level: Not on file   Occupational History    Not on file   Social Needs    Financial resource strain: Not on file    Food insecurity:     Worry: Not on file     Inability: Not on file    Transportation needs:     Medical: Not on file     Non-medical: Not on file   Tobacco Use    Smoking status: Never Smoker    Smokeless tobacco: Never Used   Substance and Sexual Activity    Alcohol use: Yes     Comment: Social     Drug use: No    Sexual activity: Not on file   Lifestyle    Physical activity:     Days per week: Not on file     Minutes per session: Not on file    Stress: Not on file   Relationships    Social connections:     Talks on phone: Not on file     Gets together: Not on file     Attends Islam service: Not on file     Active member of club or organization: Not on file     Attends meetings of clubs or organizations: Not on file     Relationship status: Not on file    Intimate partner violence:     Fear of current or ex partner: Not on file     Emotionally abused: Not on file     Physically abused: Not on file     Forced sexual activity: Not on file   Other Topics Concern    Not on file   Social History Narrative    Exercise:  Yoga    Pets in the home     Past Medical History:   Diagnosis Date    Adjustment disorder with anxious mood 02/07/2006    last assessed - [id number]    Degeneration of cervical intervertebral disc 02/07/2006    Dysfunctional uterine bleeding 02/07/2006    last assessed - [id number]    Essential hypertension     last assessed - [id number]    Menopause 02/07/2006    last assessed - [id number]    PAC (premature atrial contraction)     last assessed - [id number]    Polyp of sigmoid colon      Past Surgical History:   Procedure Laterality Date    TONSILLECTOMY       No Known Allergies    Current Outpatient Medications:     BIOTIN 5000 PO, Take 1 tablet by mouth daily, Disp: , Rfl:     Cholecalciferol (VITAMIN D-3) 1000 units CAPS, Take 1 capsule by mouth daily, Disp: , Rfl:     Cyanocobalamin (BL VITAMIN B-12 PO), Take 1 tablet by mouth daily, Disp: , Rfl:     ferrous sulfate 325 (65 Fe) mg tablet, Take 1 tablet by mouth once a week  , Disp: , Rfl:     benzonatate (TESSALON PERLES) 100 mg capsule, Take 1 capsule (100 mg total) by mouth 3 (three) times a day as needed for cough for up to 5 days, Disp: 20 capsule, Rfl: 0    Review of Systems   Constitutional: Negative  HENT: Negative  Eyes: Negative  Respiratory: Positive for cough  Cardiovascular: Negative  Musculoskeletal: Negative  Skin: Negative  Neurological: Negative  Psychiatric/Behavioral: Negative  Objective:    /60   Pulse 98   Temp 97 9 °F (36 6 °C)   Resp 16   Ht 5' 4 6" (1 641 m)   Wt 65 9 kg (145 lb 3 2 oz)   SpO2 98%   BMI 24 46 kg/m² (Reviewed)     Physical Exam   Constitutional: She is oriented to person, place, and time  Vital signs are normal  She appears well-developed and well-nourished  HENT:   Head: Normocephalic and atraumatic  Right Ear: Tympanic membrane and external ear normal    Left Ear: Tympanic membrane and external ear normal    Nose: Nose normal    Mouth/Throat: Uvula is midline, oropharynx is clear and moist and mucous membranes are normal    Eyes: Pupils are equal, round, and reactive to light  Conjunctivae, EOM and lids are normal    Neck: Trachea normal and normal range of motion  Cardiovascular: Normal rate, regular rhythm, normal heart sounds and intact distal pulses  Pulmonary/Chest: Effort normal  She has no decreased breath sounds  She has wheezes in the right lower field  She has rhonchi in the right upper field and the left upper field  She has no rales  Neurological: She is alert and oriented to person, place, and time  Skin: Skin is warm and dry  Capillary refill takes less than 2 seconds  Psychiatric: She has a normal mood and affect   Her behavior is normal

## 2019-05-20 ENCOUNTER — PROCEDURE VISIT (OUTPATIENT)
Dept: FAMILY MEDICINE CLINIC | Facility: CLINIC | Age: 64
End: 2019-05-20
Payer: COMMERCIAL

## 2019-05-20 VITALS
OXYGEN SATURATION: 98 % | DIASTOLIC BLOOD PRESSURE: 68 MMHG | HEIGHT: 65 IN | BODY MASS INDEX: 24.32 KG/M2 | SYSTOLIC BLOOD PRESSURE: 118 MMHG | TEMPERATURE: 99.2 F | RESPIRATION RATE: 17 BRPM | WEIGHT: 146 LBS | HEART RATE: 86 BPM

## 2019-05-20 DIAGNOSIS — I10 BENIGN ESSENTIAL HYPERTENSION: ICD-10-CM

## 2019-05-20 DIAGNOSIS — Z00.00 PREVENTATIVE HEALTH CARE: ICD-10-CM

## 2019-05-20 DIAGNOSIS — H61.23 BILATERAL HEARING LOSS DUE TO CERUMEN IMPACTION: Primary | ICD-10-CM

## 2019-05-20 PROCEDURE — 99213 OFFICE O/P EST LOW 20 MIN: CPT | Performed by: NURSE PRACTITIONER

## 2019-06-04 ENCOUNTER — APPOINTMENT (OUTPATIENT)
Dept: LAB | Facility: CLINIC | Age: 64
End: 2019-06-04
Payer: COMMERCIAL

## 2019-06-04 ENCOUNTER — TELEPHONE (OUTPATIENT)
Dept: FAMILY MEDICINE CLINIC | Facility: CLINIC | Age: 64
End: 2019-06-04

## 2019-06-04 DIAGNOSIS — I10 BENIGN ESSENTIAL HYPERTENSION: ICD-10-CM

## 2019-06-04 DIAGNOSIS — Z00.00 PREVENTATIVE HEALTH CARE: ICD-10-CM

## 2019-06-04 LAB
ALBUMIN SERPL BCP-MCNC: 4.1 G/DL (ref 3.5–5)
ALP SERPL-CCNC: 69 U/L (ref 46–116)
ALT SERPL W P-5'-P-CCNC: 28 U/L (ref 12–78)
ANION GAP SERPL CALCULATED.3IONS-SCNC: 2 MMOL/L (ref 4–13)
AST SERPL W P-5'-P-CCNC: 15 U/L (ref 5–45)
BASOPHILS # BLD AUTO: 0.05 THOUSANDS/ΜL (ref 0–0.1)
BASOPHILS NFR BLD AUTO: 1 % (ref 0–1)
BILIRUB SERPL-MCNC: 0.72 MG/DL (ref 0.2–1)
BUN SERPL-MCNC: 13 MG/DL (ref 5–25)
CALCIUM SERPL-MCNC: 8.5 MG/DL (ref 8.3–10.1)
CHLORIDE SERPL-SCNC: 106 MMOL/L (ref 100–108)
CHOLEST SERPL-MCNC: 189 MG/DL (ref 50–200)
CO2 SERPL-SCNC: 30 MMOL/L (ref 21–32)
CREAT SERPL-MCNC: 0.67 MG/DL (ref 0.6–1.3)
EOSINOPHIL # BLD AUTO: 0.09 THOUSAND/ΜL (ref 0–0.61)
EOSINOPHIL NFR BLD AUTO: 2 % (ref 0–6)
ERYTHROCYTE [DISTWIDTH] IN BLOOD BY AUTOMATED COUNT: 15.4 % (ref 11.6–15.1)
GFR SERPL CREATININE-BSD FRML MDRD: 93 ML/MIN/1.73SQ M
GLUCOSE P FAST SERPL-MCNC: 84 MG/DL (ref 65–99)
HCT VFR BLD AUTO: 38.3 % (ref 34.8–46.1)
HDLC SERPL-MCNC: 53 MG/DL (ref 40–60)
HGB BLD-MCNC: 14.2 G/DL (ref 11.5–15.4)
IMM GRANULOCYTES # BLD AUTO: 0.01 THOUSAND/UL (ref 0–0.2)
IMM GRANULOCYTES NFR BLD AUTO: 0 % (ref 0–2)
LDLC SERPL CALC-MCNC: 121 MG/DL (ref 0–100)
LYMPHOCYTES # BLD AUTO: 1.79 THOUSANDS/ΜL (ref 0.6–4.47)
LYMPHOCYTES NFR BLD AUTO: 41 % (ref 14–44)
MCH RBC QN AUTO: 37.5 PG (ref 26.8–34.3)
MCHC RBC AUTO-ENTMCNC: 37.1 G/DL (ref 31.4–37.4)
MCV RBC AUTO: 101 FL (ref 82–98)
MONOCYTES # BLD AUTO: 0.46 THOUSAND/ΜL (ref 0.17–1.22)
MONOCYTES NFR BLD AUTO: 11 % (ref 4–12)
NEUTROPHILS # BLD AUTO: 1.92 THOUSANDS/ΜL (ref 1.85–7.62)
NEUTS SEG NFR BLD AUTO: 45 % (ref 43–75)
NONHDLC SERPL-MCNC: 136 MG/DL
NRBC BLD AUTO-RTO: 0 /100 WBCS
PLATELET # BLD AUTO: 209 THOUSANDS/UL (ref 149–390)
PMV BLD AUTO: 11.6 FL (ref 8.9–12.7)
POTASSIUM SERPL-SCNC: 3.7 MMOL/L (ref 3.5–5.3)
PROT SERPL-MCNC: 7.3 G/DL (ref 6.4–8.2)
RBC # BLD AUTO: 3.79 MILLION/UL (ref 3.81–5.12)
SODIUM SERPL-SCNC: 138 MMOL/L (ref 136–145)
TRIGL SERPL-MCNC: 75 MG/DL
TSH SERPL DL<=0.05 MIU/L-ACNC: 2 UIU/ML (ref 0.36–3.74)
WBC # BLD AUTO: 4.32 THOUSAND/UL (ref 4.31–10.16)

## 2019-06-04 PROCEDURE — 84443 ASSAY THYROID STIM HORMONE: CPT

## 2019-06-04 PROCEDURE — 85025 COMPLETE CBC W/AUTO DIFF WBC: CPT

## 2019-06-04 PROCEDURE — 80061 LIPID PANEL: CPT

## 2019-06-04 PROCEDURE — 36415 COLL VENOUS BLD VENIPUNCTURE: CPT

## 2019-06-04 PROCEDURE — 80053 COMPREHEN METABOLIC PANEL: CPT

## 2019-06-12 ENCOUNTER — ANNUAL EXAM (OUTPATIENT)
Dept: FAMILY MEDICINE CLINIC | Facility: CLINIC | Age: 64
End: 2019-06-12
Payer: COMMERCIAL

## 2019-06-12 VITALS
RESPIRATION RATE: 16 BRPM | BODY MASS INDEX: 24.01 KG/M2 | WEIGHT: 142.5 LBS | DIASTOLIC BLOOD PRESSURE: 62 MMHG | OXYGEN SATURATION: 95 % | TEMPERATURE: 99.1 F | SYSTOLIC BLOOD PRESSURE: 120 MMHG | HEART RATE: 83 BPM

## 2019-06-12 DIAGNOSIS — Z01.419 ENCOUNTER FOR GYNECOLOGICAL EXAMINATION WITHOUT ABNORMAL FINDING: Primary | ICD-10-CM

## 2019-06-12 DIAGNOSIS — Z78.0 POST-MENOPAUSAL: ICD-10-CM

## 2019-06-12 DIAGNOSIS — E78.00 PURE HYPERCHOLESTEROLEMIA: ICD-10-CM

## 2019-06-12 DIAGNOSIS — Z12.4 SCREENING FOR CERVICAL CANCER: ICD-10-CM

## 2019-06-12 DIAGNOSIS — Z12.39 SCREENING FOR BREAST CANCER: ICD-10-CM

## 2019-06-12 PROBLEM — E78.01 FAMILIAL HYPERCHOLESTEROLEMIA: Status: RESOLVED | Noted: 2018-04-18 | Resolved: 2019-06-12

## 2019-06-12 PROBLEM — Z12.31 BREAST CANCER SCREENING BY MAMMOGRAM: Status: RESOLVED | Noted: 2018-04-18 | Resolved: 2019-06-12

## 2019-06-12 PROCEDURE — G0101 CA SCREEN;PELVIC/BREAST EXAM: HCPCS | Performed by: FAMILY MEDICINE

## 2019-06-12 PROCEDURE — 87624 HPV HI-RISK TYP POOLED RSLT: CPT | Performed by: FAMILY MEDICINE

## 2019-06-12 PROCEDURE — G0145 SCR C/V CYTO,THINLAYER,RESCR: HCPCS | Performed by: FAMILY MEDICINE

## 2019-06-14 LAB
HPV HR 12 DNA CVX QL NAA+PROBE: NEGATIVE
HPV16 DNA CVX QL NAA+PROBE: NEGATIVE
HPV18 DNA CVX QL NAA+PROBE: NEGATIVE

## 2019-06-19 LAB
LAB AP GYN PRIMARY INTERPRETATION: NORMAL
Lab: NORMAL

## 2019-07-18 ENCOUNTER — HOSPITAL ENCOUNTER (OUTPATIENT)
Dept: RADIOLOGY | Age: 64
Discharge: HOME | End: 2019-07-18
Payer: COMMERCIAL

## 2019-07-18 DIAGNOSIS — Z78.0 POST-MENOPAUSAL: ICD-10-CM

## 2019-07-18 PROCEDURE — 77080 DXA BONE DENSITY AXIAL: CPT

## 2019-07-19 ENCOUNTER — OFFICE VISIT (OUTPATIENT)
Dept: FAMILY MEDICINE CLINIC | Facility: CLINIC | Age: 64
End: 2019-07-19
Payer: COMMERCIAL

## 2019-07-19 VITALS
SYSTOLIC BLOOD PRESSURE: 108 MMHG | WEIGHT: 147.5 LBS | BODY MASS INDEX: 24.57 KG/M2 | HEIGHT: 65 IN | DIASTOLIC BLOOD PRESSURE: 70 MMHG | TEMPERATURE: 98.2 F | OXYGEN SATURATION: 98 % | RESPIRATION RATE: 16 BRPM | HEART RATE: 83 BPM

## 2019-07-19 DIAGNOSIS — Z11.59 NEED FOR HEPATITIS C SCREENING TEST: ICD-10-CM

## 2019-07-19 DIAGNOSIS — E55.9 VITAMIN D DEFICIENCY: ICD-10-CM

## 2019-07-19 DIAGNOSIS — Z12.39 SCREENING FOR BREAST CANCER: ICD-10-CM

## 2019-07-19 DIAGNOSIS — Z13.6 SCREENING FOR CARDIOVASCULAR CONDITION: ICD-10-CM

## 2019-07-19 DIAGNOSIS — E78.00 PURE HYPERCHOLESTEROLEMIA: ICD-10-CM

## 2019-07-19 DIAGNOSIS — Z23 NEED FOR PNEUMOCOCCAL VACCINATION: ICD-10-CM

## 2019-07-19 DIAGNOSIS — Z00.00 WELCOME TO MEDICARE PREVENTIVE VISIT: Primary | ICD-10-CM

## 2019-07-19 PROCEDURE — G0403 EKG FOR INITIAL PREVENT EXAM: HCPCS | Performed by: FAMILY MEDICINE

## 2019-07-19 PROCEDURE — G0009 ADMIN PNEUMOCOCCAL VACCINE: HCPCS | Performed by: FAMILY MEDICINE

## 2019-07-19 PROCEDURE — 4040F PNEUMOC VAC/ADMIN/RCVD: CPT | Performed by: FAMILY MEDICINE

## 2019-07-19 PROCEDURE — 90670 PCV13 VACCINE IM: CPT | Performed by: FAMILY MEDICINE

## 2019-07-19 PROCEDURE — G0402 INITIAL PREVENTIVE EXAM: HCPCS | Performed by: FAMILY MEDICINE

## 2019-07-19 RX ORDER — CHLORAL HYDRATE 500 MG
1000 CAPSULE ORAL DAILY
COMMUNITY
End: 2020-09-11 | Stop reason: ALTCHOICE

## 2019-07-19 NOTE — PATIENT INSTRUCTIONS
Obesity   AMBULATORY CARE:   Obesity  is when your body mass index (BMI) is greater than 30  Your healthcare provider will use your height and weight to measure your BMI  The risks of obesity include  many health problems, such as injuries or physical disability  You may need tests to check for the following:  · Diabetes     · High blood pressure or high cholesterol     · Heart disease     · Gallbladder or liver disease     · Cancer of the colon, breast, prostate, liver, or kidney     · Sleep apnea     · Arthritis or gout  Seek care immediately if:   · You have a severe headache, confusion, or difficulty speaking  · You have weakness on one side of your body  · You have chest pain, sweating, or shortness of breath  Contact your healthcare provider if:   · You have symptoms of gallbladder or liver disease, such as pain in your upper abdomen  · You have knee or hip pain and discomfort while walking  · You have symptoms of diabetes, such as intense hunger and thirst, and frequent urination  · You have symptoms of sleep apnea, such as snoring or daytime sleepiness  · You have questions or concerns about your condition or care  Treatment for obesity  focuses on helping you lose weight to improve your health  Even a small decrease in BMI can reduce the risk for many health problems  Your healthcare provider will help you set a weight-loss goal   · Lifestyle changes  are the first step in treating obesity  These include making healthy food choices and getting regular physical activity  Your healthcare provider may suggest a weight-loss program that involves coaching, education, and therapy  · Medicine  may help you lose weight when it is used with a healthy diet and physical activity  · Surgery  can help you lose weight if you are very obese and have other health problems  There are several types of weight-loss surgery  Ask your healthcare provider for more information    Be successful losing weight:   · Set small, realistic goals  An example of a small goal is to walk for 20 minutes 5 days a week  Anther goal is to lose 5% of your body weight  · Tell friends, family members, and coworkers about your goals  and ask for their support  Ask a friend to lose weight with you, or join a weight-loss support group  · Identify foods or triggers that may cause you to overeat , and find ways to avoid them  Remove tempting high-calorie foods from your home and workplace  Place a bowl of fresh fruit on your kitchen counter  If stress causes you to eat, then find other ways to cope with stress  · Keep a diary to track what you eat and drink  Also write down how many minutes of physical activity you do each day  Weigh yourself once a week and record it in your diary  Eating changes: You will need to eat 500 to 1,000 fewer calories each day than you currently eat to lose 1 to 2 pounds a week  The following changes will help you cut calories:  · Eat smaller portions  Use small plates, no larger than 9 inches in diameter  Fill your plate half full of fruits and vegetables  Measure your food using measuring cups until you know what a serving size looks like  · Eat 3 meals and 1 or 2 snacks each day  Plan your meals in advance  [facility] and eat at home most of the time  Eat slowly  · Eat fruits and vegetables at every meal   They are low in calories and high in fiber, which makes you feel full  Do not add butter, margarine, or cream sauce to vegetables  Use herbs to season steamed vegetables  · Eat less fat and fewer fried foods  Eat more baked or grilled chicken and fish  These protein sources are lower in calories and fat than red meat  Limit fast food  Dress your salads with olive oil and vinegar instead of bottled dressing  · Limit the amount of sugar you eat  Do not drink sugary beverages  Limit alcohol  Activity changes:  Physical activity is good for your body in many ways   It helps you burn calories and build strong muscles  It decreases stress and depression, and improves your mood  It can also help you sleep better  Talk to your healthcare provider before you begin an exercise program   · Exercise for at least 30 minutes 5 days a week  Start slowly  Set aside time each day for physical activity that you enjoy and that is convenient for you  It is best to do both weight training and an activity that increases your heart rate, such as walking, bicycling, or swimming  · Find ways to be more active  Do yard work and housecleaning  Walk up the stairs instead of using elevators  Spend your leisure time going to events that require walking, such as outdoor festivals or fairs  This extra physical activity can help you lose weight and keep it off  Follow up with your healthcare provider as directed: You may need to meet with a dietitian  Write down your questions so you remember to ask them during your visits  © 2017 [facility] Information is for End User's use only and may not be sold, redistributed or otherwise used for commercial purposes  All illustrations and images included in CareNotes® are the copyrighted property of [employer]  or Mikel Corral  The above information is an  only  It is not intended as medical advice for individual conditions or treatments  Talk to your doctor, nurse or pharmacist before following any medical regimen to see if it is safe and effective for you  Urinary Incontinence   WHAT YOU NEED TO KNOW:   What is urinary incontinence? Urinary incontinence (UI) is when you lose control of your bladder  What causes UI? UI occurs because your bladder cannot store or empty urine properly  The following are the most common types of UI:  · Stress incontinence  is when you leak urine due to increased bladder pressure  This may happen when you cough, sneeze, or exercise       · Urge incontinence  is when you feel the need to urinate right away and leak urine accidentally  · Mixed incontinence  is when you have both stress and urge UI  What are the signs and symptoms of UI?   · You feel like your bladder does not empty completely when you urinate  · You urinate often and need to urinate immediately  · You leak urine when you sleep, or you wake up with the urge to urinate  · You leak urine when you cough, sneeze, exercise, or laugh  How is UI diagnosed? Your healthcare provider will ask how often you leak urine and whether you have stress or urge symptoms  Tell him which medicines you take, how often you urinate, and how much liquid you drink each day  You may need any of the following tests:  · Urine tests  may show infection or kidney function  · A pelvic exam  may be done to check for blockages  A pelvic exam will also show if your bladder, uterus, or other organs have moved out of place  · An x-ray, ultrasound, or CT  may show problems with parts of your urinary system  You may be given contrast liquid to help your organs show up better in the pictures  Tell the healthcare provider if you have ever had an allergic reaction to contrast liquid  Do not enter the MRI room with anything metal  Metal can cause serious injury  Tell the healthcare provider if you have any metal in or on your body  · A bladder scan  will show how much urine is left in your bladder after you urinate  You will be asked to urinate and then healthcare providers will use a small ultrasound machine to check the urine left in your bladder  · Cystometry  is used to check the function of your urinary system  Your healthcare provider checks the pressure in your bladder while filling it with fluid  Your bladder pressure may also be tested when your bladder is full and while you urinate  How is UI treated? · Medicines  can help strengthen your bladder control      · Electrical stimulation  is used to send a small amount of electrical energy to your pelvic floor muscles  This helps control your bladder function  Electrodes may be placed outside your body or in your rectum  For women, the electrodes may be placed in the vagina  · A bulking agent  may be injected into the wall of your urethra to make it thicker  This helps keep your urethra closed and decreases urine leakage  · Devices  such as a clamp, pessary, or tampon may help stop urine leaks  Ask your healthcare provider for more information about these and other devices  · Surgery  may be needed if other treatments do not work  Several types of surgery can help improve your bladder control  Ask your healthcare provider for more information about the surgery you may need  How can I manage my symptoms? · Do pelvic muscle exercises often  Your pelvic muscles help you stop urinating  Squeeze these muscles tight for 5 seconds, then relax for 5 seconds  Gradually work up to squeezing for 10 seconds  Do 3 sets of 15 repetitions a day, or as directed  This will help strengthen your pelvic muscles and improve bladder control  · A catheter  may be used to help empty your bladder  A catheter is a tiny, plastic tube that is put into your bladder to drain your urine  Your healthcare provider may tell you to use a catheter to prevent your bladder from getting too full and leaking urine  · Keep a UI record  Write down how often you leak urine and how much you leak  Make a note of what you were doing when you leaked urine  · Train your bladder  Go to the bathroom at set times, such as every 2 hours, even if you do not feel the urge to go  You can also try to hold your urine when you feel the urge to go  For example, hold your urine for 5 minutes when you feel the urge to go  As that becomes easier, hold your urine for 10 minutes  · Drink liquids as directed  Ask your healthcare provider how much liquid to drink each day and which liquids are best for you   You may need to limit the amount of liquid you drink to help control your urine leakage  Limit or do not have drinks that contain caffeine or alcohol  Do not drink any liquid right before you go to bed  · Prevent constipation  Eat a variety of high-fiber foods  Good examples are high-fiber cereals, beans, vegetables, and whole-grain breads  Prune juice may help make your bowel movement softer  Walking is the best way to trigger your intestines to have a bowel movement  · Exercise regularly and maintain a healthy weight  Ask your healthcare provider how much you should weigh and about the best exercise plan for you  Weight loss and exercise will decrease pressure on your bladder and help you control your leakage  Ask him to help you create a weight loss plan if you are overweight  When should I seek immediate care? · You have severe pain  · You are confused or cannot think clearly  When should I contact my healthcare provider? · You have a fever  · You see blood in your urine  · You have pain when you urinate  · You have new or worse pain, even after treatment  · Your mouth feels dry or you have vision changes  · Your urine is cloudy or smells bad  · You have questions or concerns about your condition or care  CARE AGREEMENT:   You have the right to help plan your care  Learn about your health condition and how it may be treated  Discuss treatment options with your caregivers to decide what care you want to receive  You always have the right to refuse treatment  The above information is an  only  It is not intended as medical advice for individual conditions or treatments  Talk to your doctor, nurse or pharmacist before following any medical regimen to see if it is safe and effective for you  © 2017 [address] Information is for End User's use only and may not be sold, redistributed or otherwise used for commercial purposes   All illustrations and images included in CareNotes® are the copyrighted property of [employer]  or Mikel Corral  Cigarette Smoking and Your Health   AMBULATORY CARE:   Risks to your health if you smoke:  Nicotine and other chemicals found in tobacco damage every cell in your body  Even if you are a light smoker, you have an increased risk for cancer, heart disease, and lung disease  If you are pregnant or have diabetes, smoking increases your risk for complications  Benefits to your health if you stop smoking:   · You decrease respiratory symptoms such as coughing, wheezing, and shortness of breath  · You reduce your risk for cancers of the lung, mouth, throat, kidney, bladder, pancreas, stomach, and cervix  If you already have cancer, you increase the benefits of chemotherapy  You also reduce your risk for cancer returning or a second cancer from developing  · You reduce your risk for heart disease, blood clots, heart attack, and stroke  · You reduce your risk for lung infections, and diseases such as pneumonia, asthma, chronic bronchitis, and emphysema  · Your circulation improves  More oxygen can be delivered to your body  If you have diabetes, you lower your risk for complications, such as kidney, artery, and eye diseases  You also lower your risk for nerve damage  Nerve damage can lead to amputations, poor vision, and blindness  · You improve your body's ability to heal and to fight infections  Benefits to the health of others if you stop smoking:  Tobacco is harmful to nonsmokers who breathe in your secondhand smoke  The following are ways the health of others around you may improve when you stop smoking:  · You lower the risks for lung cancer and heart disease in nonsmoking adults  · If you are pregnant, you lower the risk for miscarriage, early delivery, low birth weight, and stillbirth  You also lower your baby's risk for SIDS, obesity, developmental delay, and neurobehavioral problems, such as ADHD  · If you have children, you lower their risk for ear infections, colds, pneumonia, bronchitis, and asthma  For more information and support to stop smoking:   · [employer]  gov  Phone: 8- 548 - 149-6725  Web Address: www smokefree  gov  Follow up with your healthcare provider as directed:  Write down your questions so you remember to ask them during your visits  © 2017 [address] Information is for End User's use only and may not be sold, redistributed or otherwise used for commercial purposes  All illustrations and images included in CareNotes® are the copyrighted property of [employer]  or Mikel Corral  The above information is an  only  It is not intended as medical advice for individual conditions or treatments  Talk to your doctor, nurse or pharmacist before following any medical regimen to see if it is safe and effective for you  Fall Prevention   AMBULATORY CARE:   Fall prevention  includes ways to make your home and other areas safer  It also includes ways you can move more carefully to prevent a fall  Health conditions that cause changes in your blood pressure, vision, or muscle strength and coordination may increase your risk for falls  Medicines may also increase your risk for falls if they make you dizzy, weak, or sleepy  Call 911 or have someone else call if:   · You have fallen and are unconscious  · You have fallen and cannot move part of your body  Contact your healthcare provider if:   · You have fallen and have pain or a headache  · You have questions or concerns about your condition or care  Fall prevention tips:   · Stand or sit up slowly  This may help you keep your balance and prevent falls  · Use assistive devices as directed  Your healthcare provider may suggest that you use a cane or walker to help you keep your balance  You may need to have grab bars put in your bathroom near the toilet or in the shower      · Wear shoes that fit well and have soles that   Wear shoes both inside and outside  Use slippers with good   Do not wear shoes with high heels  · Wear a personal alarm  This is a device that allows you to call 911 if you fall and need help  Ask your healthcare provider for more information  · Stay active  Exercise can help strengthen your muscles and improve your balance  Your healthcare provider may recommend water aerobics or walking  He or she may also recommend physical therapy to improve your coordination  Never start an exercise program without talking to your healthcare provider first      · Manage your medical conditions  Keep all appointments with your healthcare providers  Visit your eye doctor as directed  Home safety tips:   · Add items to prevent falls in the bathroom  Put nonslip strips on your bath or shower floor to prevent you from slipping  Use a bath mat if you do not have carpet in the bathroom  This will prevent you from falling when you step out of the bath or shower  Use a shower seat so you do not need to stand while you shower  Sit on the toilet or a chair in your bathroom to dry yourself and put on clothing  This will prevent you from losing your balance from drying or dressing yourself while you are standing  · Keep paths clear  Remove books, shoes, and other objects from walkways and stairs  Place cords for telephones and lamps out of the way so that you do not need to walk over them  Tape them down if you cannot move them  Remove small rugs  If you cannot remove a rug, secure it with double-sided tape  This will prevent you from tripping  · Install bright lights in your home  Use night lights to help light paths to the bathroom or kitchen  Always turn on the light before you start walking  · Keep items you use often on shelves within reach  Do not use a step stool to help you reach an item  · Paint or place reflective tape on the edges of your stairs    This will help you see the stairs better  Follow up with your healthcare provider as directed:  Write down your questions so you remember to ask them during your visits  © 2017 [address] Information is for End User's use only and may not be sold, redistributed or otherwise used for commercial purposes  All illustrations and images included in CareNotes® are the copyrighted property of [employer]  or Mikel Corral  The above information is an  only  It is not intended as medical advice for individual conditions or treatments  Talk to your doctor, nurse or pharmacist before following any medical regimen to see if it is safe and effective for you  Advance Directives   WHAT YOU NEED TO KNOW:   What are advance directives? Advance directives are legal documents that state your wishes and plans for medical care  These plans are made ahead of time in case you lose your ability to make decisions for yourself  Advance directives can apply to any medical decision, such as the treatments you want, and if you want to donate organs  What are the types of advance directives? There are many types of advance directives, and each state has rules about how to use them  You may choose a combination of any of the following:  · Living will: This is a written record of the treatment you want  You can also choose which treatments you do not want, which to limit, and which to stop at a certain time  This includes surgery, medicine, IV fluid, and tube feedings  · Durable power of  for healthcare [facility]): This is a written record that states who you want to make healthcare choices for you when you are unable to make them for yourself  This person, called a proxy, is usually a family member or a friend  You may choose more than 1 proxy  · Do not resuscitate (DNR) order:  A DNR order is used in case your heart stops beating or you stop breathing   It is a request not to have certain forms of treatment, such as CPR  A DNR order may be included in other types of advance directives  · Medical directive: This covers the care that you want if you are in a coma, near death, or unable to make decisions for yourself  You can list the treatments you want for each condition  Treatment may include pain medicine, surgery, blood transfusions, dialysis, IV or tube feedings, and a ventilator (breathing machine)  · Values history: This document has questions about your views, beliefs, and how you feel and think about life  This information can help others choose the care that you would choose  Why are advance directives important? An advance directive helps you control your care  Although spoken wishes may be used, it is better to have your wishes written down  Spoken wishes can be misunderstood, or not followed  Treatments may be given even if you do not want them  An advance directive may make it easier for your family to make difficult choices about your care  How do I decide what to put in my advance directives? · Make informed decisions:  Make sure you fully understand treatments or care you may receive  Think about the benefits and problems your decisions could cause for you or your family  Talk to healthcare providers if you have concerns or questions before you write down your wishes  You may also want to talk with your Jehovah's witness or , or a   Check your state laws to make sure that what you put in your advance directive is legal      · Sign all forms:  Sign and date your advance directive when you have finished  You may also need 2 witnesses to sign the forms  Witnesses cannot be your doctor or his staff, your spouse, heirs or beneficiaries, people you owe money to, or your chosen proxy  Talk to your family, proxy, and healthcare providers about your advance directive  Give each person a copy, and keep one for yourself in a place you can get to easily   Do not keep it hidden or locked away  · Review and revise your plans: You can revise your advance directive at any time, as long as you are able to make decisions  Review your plan every year, and when there are changes in your life, or your health  When you make changes, let your family, proxy, and healthcare providers know  Give each a new copy  Where can I find more information? · [employer]  [address] , [address]  Phone: [phone]  Phone: [phone]  Web Address: http://www  aafp org  · [address] [facility])  [address] [address] [facility], [address]  Phone: [phone]  Phone: [phone]  Web Address: Jessica gonzalez  CARE AGREEMENT:   You have the right to help plan your care  To help with this plan, you must learn about your health condition and treatment options  You must also learn about advance directives and how they are used  Work with your healthcare providers to decide what care will be used to treat you  You always have the right to refuse treatment  The above information is an  only  It is not intended as medical advice for individual conditions or treatments  Talk to your doctor, nurse or pharmacist before following any medical regimen to see if it is safe and effective for you  © 2017 [address] Information is for End User's use only and may not be sold, redistributed or otherwise used for commercial purposes  All illustrations and images included in CareNotes® are the copyrighted property of [employer]  or Mikel Corral

## 2019-07-19 NOTE — PROGRESS NOTES
Assessment and Plan:     Problem List Items Addressed This Visit     None      Visit Diagnoses     Need for hepatitis C screening test    -  Primary         History of Present Illness:     Patient presents for [facility] to [facility] Annual Wellness visit    Patient Care Team:  Hero Luis MD as PCP - General (Family Medicine)  Clyde Montero DO     Problem List:     Patient Active Problem List   Diagnosis    Dense breast tissue on mammogram    Benign colon polyp    Dysmetabolic syndrome X    Vitreous detachment    Pure hypercholesterolemia      Past Medical and Surgical History:     Past Medical History:   Diagnosis Date    Adjustment disorder with anxious mood 02/07/2006    last assessed - [id number]    Degeneration of cervical intervertebral disc 02/07/2006    Dysfunctional uterine bleeding 02/07/2006    last assessed - [id number]    Essential hypertension     last assessed - [id number]    Menopause 02/07/2006    last assessed - [id number]    PAC (premature atrial contraction)     last assessed - [id number]    Polyp of sigmoid colon      Past Surgical History:   Procedure Laterality Date    TONSILLECTOMY        Family History:     Family History   Problem Relation Age of Onset    Coronary artery disease Mother     Arthritis Mother     Atrial fibrillation Mother     Stroke Mother         cerebrovascular accident    Diabetes Mother     Colon cancer Mother     Cancer Father     Colon cancer Father     Coronary aneurysm Brother     Coronary artery disease Brother       Social History:     Social History     Tobacco Use   Smoking Status Never Smoker   Smokeless Tobacco Never Used     Social History     Substance and Sexual Activity   Alcohol Use Yes    Binge frequency: Weekly    Comment: Social      Social History     Substance and Sexual Activity   Drug Use No      Medications and Allergies:     Current Outpatient Medications   Medication Sig Dispense Refill    BIOTIN 5000 PO Take 1 tablet by mouth daily      Cholecalciferol (VITAMIN D-3) 1000 units CAPS Take 1 capsule by mouth daily      Cyanocobalamin (BL VITAMIN B-12 PO) Take 1 tablet by mouth daily      ferrous sulfate 325 (65 Fe) mg tablet Take 1 tablet by mouth once a week        Omega-3 Fatty Acids (FISH OIL) 1,000 mg Take 1,000 mg by mouth daily       No current facility-administered medications for this visit  No Known Allergies   Immunizations:     Immunization History   Administered Date(s) Administered    Hep B, adult 10/20/2014, 12/03/2014, 04/10/2015    INFLUENZA 09/21/2009, 10/11/2010    Tdap 08/01/2013      Medicare Screening Tests and Risk Assessments:     Ruth Richards is here for her Subsequent Wellness visit  Last Medicare Wellness visit information reviewed, patient interviewed and updates made to the record today  Health Risk Assessment:  Patient rates overall health as very good  Patient feels that their physical health rating is Same  Eyesight was rated as Same  Hearing was rated as Same  Patient feels that their emotional and mental health rating is Much better  Pain experienced by patient in the last 7 days has been None  Emotional/Mental Health:  Patient has been feeling nervous/anxious  PHQ-9 Depression Screening:    Frequency of the following problems over the past two weeks:      1  Little interest or pleasure in doing things: 0 - not at all      2  Feeling down, depressed, or hopeless: 0 - not at all  PHQ-2 Score: 0          Broken Bones/Falls: Fall Risk Assessment:    In the past year, patient has experienced: No history of falling in past year          Bladder/Bowel:  Patient has leaked urine accidently in the last six months  Patient reports no loss of bowel control  Immunizations:  Patient has not had a flu vaccination within the last year  Patient has not received a pneumonia shot  Patient has not received a shingles shot        Home Safety:  Patient does not have trouble with stairs inside or outside of their home  Patient currently reports that there are no safety hazards present in home, working smoke alarms, no working carbon monoxide detectors  Preventative Screenings:   Breast cancer screening performed, 9/1/2018  colon cancer screen completed, cholesterol screen completed, glaucoma eye exam completed,     Nutrition:  Current diet: Regular and Frequent junk food with servings of the following:    Medications:  Patient is currently taking over-the-counter supplements  Patient is able to manage medications  Lifestyle Choices:  Patient reports no tobacco use  Patient has not smoked or used tobacco in the past   Patient reports alcohol use  Alcohol use per week: 1  Patient drives a vehicle  Patient wears seat belt  Current level of exercise of physical activity described by patient as: yoga and walk  Activities of Daily Living:  Can get out of bed by his or her self, able to dress self, able to make own meals, able to do own shopping, able to bathe self, can do own laundry/housekeeping, can manage own money, pay bills and track expenses    Previous Hospitalizations:  No hospitalization or ED visit in past 12 months        Advanced Directives:  Patient has decided on a power of   Patient has spoken to designated power of   Patient has not completed advanced directive          Preventative Screening/Counseling:      Cardiovascular:      General: Screening Current and Risks and Benefits Discussed      Counseling: Healthy Diet, Healthy Weight and Improve Cholesterol          Diabetes:      General: Risks and Benefits Discussed      Counseling: Healthy Diet and Healthy Weight          Colorectal Cancer:      General: Risks and Benefits Discussed and Screening Current      Comments: 2017- [id number]        Breast Cancer:      General: Screening Current and Risks and Benefits Discussed          Cervical Cancer:      General: Risks and Benefits Discussed, Screening Current and Screening Not Indicated          Osteoporosis:      General: Risks and Benefits Discussed and Screening Current      Comments: Normal yesterday        AAA:      General: Screening Not Indicated          Hepatitis C:      General: Risks and Benefits Discussed      Counseling: has received general HCV counseling        Advanced Directives:   Patient has no living will for healthcare, does not have durable POA for healthcare, patient does not have an advanced directive  Information on ACP and/or AD provided  Immunizations:  Patient reviewed and up to date      Influenza: Risks & Benefits Discussed, Influenza Recommended Annually and Patient Declines      Pneumococcal: Risks & Benefits Discussed and Pneumococcal Due Today      Shingrix: Risks & Benefits Discussed and Shingrix Vaccine Needed Today      Hepatitis B (Medium to high risk patients): Risks & Benefits Discussed and Hep B Vaccine Series UTD      TD: Risks & Benefits Discussed and Td Vaccine UTD      TDAP: Risks & Benefits Discussed and Tdap Vaccine UTD          Review of Systems   Eyes: Negative for visual disturbance  Respiratory: Negative for shortness of breath  Cardiovascular: Negative for chest pain and palpitations  Gastrointestinal: Negative for bowel incontinence  Neurological: Negative for dizziness and light-headedness  Psychiatric/Behavioral: The patient is nervous/anxious  Physical Exam   Constitutional: She appears well-developed and well-nourished  No distress  Cardiovascular: Normal rate, regular rhythm and normal heart sounds  Pulmonary/Chest: Effort normal and breath sounds normal  No respiratory distress  She has no wheezes  She has no rales  Skin: She is not diaphoretic  Vitals reviewed          Visual Acuity Screening    Right eye Left eye Both eyes   Without correction:      With correction: 20/20 20/13 20/13       EKG: NSR

## 2019-10-02 DIAGNOSIS — Z12.39 SCREENING FOR BREAST CANCER: ICD-10-CM

## 2019-11-04 ENCOUNTER — OFFICE VISIT (OUTPATIENT)
Dept: FAMILY MEDICINE CLINIC | Facility: CLINIC | Age: 64
End: 2019-11-04
Payer: COMMERCIAL

## 2019-11-04 VITALS
HEART RATE: 75 BPM | BODY MASS INDEX: 24.81 KG/M2 | DIASTOLIC BLOOD PRESSURE: 66 MMHG | SYSTOLIC BLOOD PRESSURE: 120 MMHG | RESPIRATION RATE: 16 BRPM | TEMPERATURE: 97.4 F | WEIGHT: 150 LBS | OXYGEN SATURATION: 98 %

## 2019-11-04 DIAGNOSIS — R63.1 POLYDIPSIA: ICD-10-CM

## 2019-11-04 DIAGNOSIS — R68.2 DRY MOUTH: Primary | ICD-10-CM

## 2019-11-04 PROCEDURE — 99214 OFFICE O/P EST MOD 30 MIN: CPT | Performed by: FAMILY MEDICINE

## 2019-11-04 NOTE — PROGRESS NOTES
FAMILY MEDICINE PROGRESS NOTE  Jeannette Yadielanamika Stewart 71 y o  female   DATE: November 4, 2019     ASSESSMENT and PLAN:  Arleth Pace is a 71 y o  female with:     Problem List Items Addressed This Visit     None      Visit Diagnoses     Dry mouth    -  Primary    Relevant Orders    Sjogren's Antibodies    Comprehensive metabolic panel    TSH, 3rd generation with Free T4 reflex    Polydipsia        Relevant Orders    Comprehensive metabolic panel    TSH, 3rd generation with Free T4 reflex    Hemoglobin A1C      Normal oral exam, no oral lesions or ulcers, minimally dry mucosa  Given patient's anxiety, check labs to rule out underlying metabolic abnormality  Advised to do sialogogues, sugar free gum and mouth washes  Will call with results  Recommended flu vaccine, pt declined    Patient agreeable with the plan and expressed understanding  I discucssed signs and symptoms for which to RTC, go to ER or seek urgent medical care  SUBJECTIVE:  Arleth Pace is a 71 y o  female who presents today with a chief complaint of others (salty taste for over a week)  Pt has been having a few weeks (1-2 weeks) with a salty taste in her mouth  No inciting event/injury other than a large pot of soup she made the week her symptoms started, but didn't think it should last this long  She states she has been drinking more water lately but isnt sure if its because she is thirsty or just the salty mouth taste  Had some soft stools, but no diarrhea  Denies dry mouth, dry eyes, itchy eyes, HA, dizziness, palpitations  No recent Abx, travel, well water  Review of Systems   Constitutional: Negative for chills and fever  HENT: Negative for congestion, drooling and mouth sores  Respiratory: Negative for shortness of breath  Cardiovascular: Negative for palpitations  Gastrointestinal: Negative for diarrhea  Allergic/Immunologic: Negative for environmental allergies  Neurological: Negative for dizziness and headaches  I have reviewed the patient's Past Medical History  OBJECTIVE:  /66   Pulse 75   Temp (!) 97 4 °F (36 3 °C)   Resp 16   Wt 68 kg (150 lb)   SpO2 98%   BMI 24 81 kg/m²    Physical Exam   Constitutional: She is oriented to person, place, and time  She appears well-developed and well-nourished  No distress  HENT:   Head: Normocephalic and atraumatic  Nose: Nose normal    Mouth/Throat: Oropharynx is clear and moist  No oropharyngeal exudate  Eyes: Conjunctivae are normal  Right eye exhibits no discharge  Left eye exhibits no discharge  No scleral icterus  Neurological: She is alert and oriented to person, place, and time  Skin: She is not diaphoretic  Psychiatric: She has a normal mood and affect  Her behavior is normal    Vitals reviewed  Wojciech Diaz MD    Note: Portions of the record have been created with voice recognition software  Occasional wrong word or "sound a like" substitutions may have occurred due to the inherent limitations of voice recognition software  Read the chart carefully and recognize, using context, where substitutions have occurred

## 2019-11-04 NOTE — PATIENT INSTRUCTIONS
Dry Mouth   AMBULATORY CARE:   Dry mouth , or xerostomia, is a lack of saliva (spit)  Saliva helps protect your teeth from decay and your mouth from bacterial infection  Saliva also helps you chew, swallow, and digest food  Dry mouth happens when your saliva glands are not working properly  This causes a decrease in the amount of saliva your mouth produces  Other common symptoms include the following:   · Dry, sticky mouth    · Thick or stringy saliva    · Scratchy, burning, or tingling feeling on your tongue    · Chapped, cracked lips or corners of your mouth    · Trouble talking, chewing, or swallowing    · Thirst or bad breath    · Hoarse voice or dry throat    · Sores on your mouth or tongue    · Change in taste  Seek care immediately if:   · You have trouble swallowing  · Your mouth, face, or neck are swollen  · You have trouble opening your mouth  Contact your healthcare provider if:   · You have a fever  · You have tooth pain  · Your gums are irritated, painful, or bleed  · Your symptoms do not get better, or they get worse  · You have questions or concerns about your condition or care  Treatment  may include medicines to increase your saliva production  You may also need saliva substitutes to help keep your mouth moist   Manage your symptoms:   · Drink liquids as directed  You may need to drink more water than usual  It may help to sip small amounts throughout the day  This will help keep your mouth moist  Do not drink caffeine or alcohol  Do not drink acidic juices such as tomato, orange, or grapefruit  · Eat soft, moist foods  Choose foods that are cool or room temperature  Moisten dry foods with milk, broth, or other sauces  Healthy foods include fruits, vegetables, whole-grain breads, low-fat dairy products, beans, lean meats, and fish  · Brush at least twice each day  This will help prevent tooth decay and cavities  You may need to brush after each meal as well   Use a soft toothbrush and fluoride toothpaste  Floss gently once each day  Use over-the-counter mouthrinses that help increase saliva  Do not use mouthrinses that have alcohol  · Chew sugarless gum or suck on sugar-free candy  This will help increase saliva production  · Use a cool mist humidifier  A humidifier will increase air moisture in your home  This may help moisten your mouth, especially at night  · Rinse your mouth 4 times each day  Rinse after each meal  Use a mixture of salt and baking soda  Mix ½ teaspoon of salt and ½ teaspoon of baking soda in 1 cup of warm water  · Do not smoke  Tobacco products can dry out your mouth  Do not use e-cigarettes or smokeless tobacco in place of cigarettes or to help you quit  They still contain nicotine  Ask your healthcare provider for information if you currently smoke and need help to quit  Follow up with your healthcare provider as directed:  Write down your questions so you remember to ask them during your visits  © 2017 [employer]  Information is for End User's use only and may not be sold, redistributed or otherwise used for commercial purposes  All illustrations and images included in CareNotes® are the copyrighted property of [employer]  or [employer]  The above information is an  only  It is not intended as medical advice for individual conditions or treatments  Talk to your doctor, nurse or pharmacist before following any medical regimen to see if it is safe and effective for you

## 2019-11-05 ENCOUNTER — APPOINTMENT (OUTPATIENT)
Dept: LAB | Facility: CLINIC | Age: 64
End: 2019-11-05
Payer: COMMERCIAL

## 2019-11-05 DIAGNOSIS — R68.2 DRY MOUTH: ICD-10-CM

## 2019-11-05 DIAGNOSIS — R63.1 POLYDIPSIA: ICD-10-CM

## 2019-11-05 LAB
ALBUMIN SERPL BCP-MCNC: 4.1 G/DL (ref 3.5–5)
ALP SERPL-CCNC: 74 U/L (ref 46–116)
ALT SERPL W P-5'-P-CCNC: 24 U/L (ref 12–78)
ANION GAP SERPL CALCULATED.3IONS-SCNC: 5 MMOL/L (ref 4–13)
AST SERPL W P-5'-P-CCNC: 12 U/L (ref 5–45)
BILIRUB SERPL-MCNC: 0.52 MG/DL (ref 0.2–1)
BUN SERPL-MCNC: 12 MG/DL (ref 5–25)
CALCIUM SERPL-MCNC: 9.3 MG/DL (ref 8.3–10.1)
CHLORIDE SERPL-SCNC: 106 MMOL/L (ref 100–108)
CO2 SERPL-SCNC: 29 MMOL/L (ref 21–32)
CREAT SERPL-MCNC: 0.68 MG/DL (ref 0.6–1.3)
EST. AVERAGE GLUCOSE BLD GHB EST-MCNC: 105 MG/DL
GFR SERPL CREATININE-BSD FRML MDRD: 92 ML/MIN/1.73SQ M
GLUCOSE P FAST SERPL-MCNC: 81 MG/DL (ref 65–99)
HBA1C MFR BLD: 5.3 % (ref 4.2–6.3)
POTASSIUM SERPL-SCNC: 3.8 MMOL/L (ref 3.5–5.3)
PROT SERPL-MCNC: 7.2 G/DL (ref 6.4–8.2)
SODIUM SERPL-SCNC: 140 MMOL/L (ref 136–145)
TSH SERPL DL<=0.05 MIU/L-ACNC: 1.63 UIU/ML (ref 0.36–3.74)

## 2019-11-05 PROCEDURE — 80053 COMPREHEN METABOLIC PANEL: CPT

## 2019-11-05 PROCEDURE — 83036 HEMOGLOBIN GLYCOSYLATED A1C: CPT

## 2019-11-05 PROCEDURE — 36415 COLL VENOUS BLD VENIPUNCTURE: CPT

## 2019-11-05 PROCEDURE — 84443 ASSAY THYROID STIM HORMONE: CPT

## 2019-11-05 PROCEDURE — 86235 NUCLEAR ANTIGEN ANTIBODY: CPT

## 2019-11-06 LAB
ENA SS-A AB SER-ACNC: <0.2 AI (ref 0–0.9)
ENA SS-B AB SER-ACNC: <0.2 AI (ref 0–0.9)

## 2019-12-20 ENCOUNTER — OFFICE VISIT (OUTPATIENT)
Dept: FAMILY MEDICINE CLINIC | Facility: CLINIC | Age: 64
End: 2019-12-20
Payer: COMMERCIAL

## 2019-12-20 VITALS
OXYGEN SATURATION: 100 % | TEMPERATURE: 97.8 F | RESPIRATION RATE: 18 BRPM | SYSTOLIC BLOOD PRESSURE: 102 MMHG | DIASTOLIC BLOOD PRESSURE: 64 MMHG | BODY MASS INDEX: 25.27 KG/M2 | WEIGHT: 148 LBS | HEIGHT: 64 IN | HEART RATE: 68 BPM

## 2019-12-20 DIAGNOSIS — J45.20 MILD INTERMITTENT REACTIVE AIRWAY DISEASE WITHOUT COMPLICATION: ICD-10-CM

## 2019-12-20 DIAGNOSIS — J00 ACUTE NASOPHARYNGITIS: Primary | ICD-10-CM

## 2019-12-20 PROCEDURE — 99214 OFFICE O/P EST MOD 30 MIN: CPT | Performed by: PHYSICIAN ASSISTANT

## 2019-12-20 RX ORDER — ALBUTEROL SULFATE 90 UG/1
2 AEROSOL, METERED RESPIRATORY (INHALATION) EVERY 6 HOURS PRN
Qty: 8.5 G | Refills: 0 | Status: SHIPPED | OUTPATIENT
Start: 2019-12-20 | End: 2020-01-02

## 2019-12-20 NOTE — PROGRESS NOTES
Assessment/Plan:     Diagnoses and all orders for this visit:    Acute nasopharyngitis  Discussed likely viral etiology  - advised to continue OTC supportive care with Tylenol/Motrin, Mucinex and saline gargle   - encouraged rest and fluid intake   - educated Pt that cough can take 10-14 days total to resolve  - directed to return if fever over 102, symptoms persist for 14 days, thick productive cough    Mild intermittent reactive airway disease without complication  Discussed possibility of reactive airway with URI  - Ordered albuterol inhaler  Discussed use and ANNIE  - Directed to FU with no improvement despite albuterol, more frequent symptoms or night time symptoms  -     albuterol (PROAIR HFA) 90 mcg/act inhaler; Inhale 2 puffs every 6 (six) hours as needed for wheezing          Subjective:    Patient ID: Lillian Camacho is a 71 y o  female  Pt is presenting today for dry cough, hoarse voice and post nasal drip for 7 days  She had a coughing fit yesterday which lead to wheezing and SOB  It loosened up and improved with breathing the hot water (shower)  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  She has been taking Dayquil with slight improvement  Previous years she has chest tightness/wheezing during colds  No history of asthma/COPD  URI    There has been no fever  Associated symptoms include congestion and coughing  Pertinent negatives include no diarrhea, ear pain, headaches, nausea, plugged ear sensation, rhinorrhea, sinus pain, sneezing, sore throat, vomiting or wheezing  The following portions of the patient's history were reviewed and updated as appropriate: allergies, current medications and problem list     Review of Systems   HENT: Positive for congestion  Negative for ear pain, rhinorrhea, sinus pain, sneezing and sore throat  Respiratory: Positive for cough  Negative for wheezing  Gastrointestinal: Negative for diarrhea, nausea and vomiting     Neurological: Negative for headaches  Objective:  /64 (BP Location: Left arm, Patient Position: Standing, Cuff Size: Large)   Pulse 68   Temp 97 8 °F (36 6 °C)   Resp 18   Ht 5' 4" (1 626 m)   Wt 67 1 kg (148 lb)   SpO2 100%   Breastfeeding? No   BMI 25 40 kg/m²      Physical Exam   Constitutional: She is oriented to person, place, and time  She appears well-developed and well-nourished  No distress  HENT:   Head: Normocephalic and atraumatic  Eyes: Pupils are equal, round, and reactive to light  Neck: Normal range of motion  Neck supple  Cardiovascular: Normal rate, regular rhythm, normal heart sounds and intact distal pulses  Exam reveals no gallop and no friction rub  No murmur heard  Pulmonary/Chest: Effort normal and breath sounds normal  No respiratory distress  She has no wheezes  She has no rales  Neurological: She is alert and oriented to person, place, and time  Skin: Skin is warm and dry  She is not diaphoretic  Psychiatric: She has a normal mood and affect  Her behavior is normal  Thought content normal    Vitals reviewed

## 2019-12-27 ENCOUNTER — TELEPHONE (OUTPATIENT)
Dept: FAMILY MEDICINE CLINIC | Facility: CLINIC | Age: 64
End: 2019-12-27

## 2019-12-27 DIAGNOSIS — J06.9 ACUTE URI: Primary | ICD-10-CM

## 2019-12-27 RX ORDER — GUAIFENESIN AND CODEINE PHOSPHATE 100; 10 MG/5ML; MG/5ML
10 SOLUTION ORAL 4 TIMES DAILY PRN
Qty: 120 ML | Refills: 0 | Status: SHIPPED | OUTPATIENT
Start: 2019-12-27 | End: 2020-02-26 | Stop reason: ALTCHOICE

## 2019-12-27 RX ORDER — AZITHROMYCIN 250 MG/1
TABLET, FILM COATED ORAL
Qty: 6 TABLET | Refills: 0 | Status: SHIPPED | OUTPATIENT
Start: 2019-12-27 | End: 2019-12-31

## 2019-12-27 NOTE — TELEPHONE ENCOUNTER
Patient called with update  She stated she is still  having tightness in the chest and a dry cough  Patient would like to know what her next steps would be  Please advise  Patient would like a return call   [facility]

## 2019-12-27 NOTE — TELEPHONE ENCOUNTER
Spoke to patient and she would like the antibiotic and also the cough syrup   [facility]

## 2019-12-27 NOTE — TELEPHONE ENCOUNTER
Call I can send in an antibiotic  Not sure if she wants something stronger for cough such as Tessalon or codeine  Continue with inhaler  Chest x ray for persistent cough

## 2020-01-02 ENCOUNTER — OFFICE VISIT (OUTPATIENT)
Dept: FAMILY MEDICINE CLINIC | Facility: CLINIC | Age: 65
End: 2020-01-02
Payer: COMMERCIAL

## 2020-01-02 VITALS
TEMPERATURE: 99.2 F | DIASTOLIC BLOOD PRESSURE: 48 MMHG | RESPIRATION RATE: 16 BRPM | OXYGEN SATURATION: 97 % | HEART RATE: 98 BPM | SYSTOLIC BLOOD PRESSURE: 110 MMHG | BODY MASS INDEX: 26.09 KG/M2 | WEIGHT: 152 LBS

## 2020-01-02 DIAGNOSIS — M94.0 COSTOCHONDRITIS, ACUTE: Primary | ICD-10-CM

## 2020-01-02 PROCEDURE — 99213 OFFICE O/P EST LOW 20 MIN: CPT | Performed by: FAMILY MEDICINE

## 2020-01-02 RX ORDER — BENZONATATE 100 MG/1
100 CAPSULE ORAL 3 TIMES DAILY PRN
Qty: 30 CAPSULE | Refills: 0 | Status: SHIPPED | OUTPATIENT
Start: 2020-01-02 | End: 2020-01-12

## 2020-01-02 NOTE — PATIENT INSTRUCTIONS
Costochondritis   AMBULATORY CARE:   Costochondritis  is a condition that causes pain in the cartilage that connects your ribs to your sternum (breastbone)  Cartilage is the tough, bendable tissue that protects your bones  Common symptoms include the following:   · Sharp or dull, aching pain that may come and go or that gets worse over time    · Pain when you touch your chest    · Pain that spreads to your back, abdomen, or down your arm    · Pain that gets worse when you move, breathe deeply, or push or lift an object    · Trouble sleeping or doing your usual activities because of the pain  Seek immediate care if:   · Fever    · The painful areas of your chest look swollen and red, and feel warm to the touch    · Pain prevents you from sleeping  Contact your healthcare provider if:   · You have a fever  · The painful areas of your chest look swollen, red, and feel warm to the touch  · You cannot sleep because of the pain  · You have questions or concerns about your condition or care  Treatment for costochondritis  may not be needed  Costochondritis pain may go away without treatment, usually within a year  Treatment may include any of the following:  · Acetaminophen  decreases pain  Acetaminophen is available without a doctor's order  Ask how much to take and how often to take it  Follow directions  Acetaminophen can cause liver damage if not taken correctly  · NSAIDs , such as ibuprofen, help decrease swelling, pain, and fever  This medicine is available with or without a doctor's order  NSAIDs can cause stomach bleeding or kidney problems in certain people  If you take blood thinner medicine, always ask if NSAIDs are safe for you  Always read the medicine label and follow directions  Do not give these medicines to children under 10months of age without direction from your child's healthcare provider  Manage your symptoms:   · Rest as needed  Avoid painful movements and activities   Do not carry objects, such as a purse or backpack, if this causes pain  Avoid activities such as weightlifting until your pain decreases or goes away  Ask your healthcare provider which activities are best for you to do while you recover  · Apply heat to help decrease pain  Apply heat on the area for 20 to 30 minutes every 2 hours for as many days as directed  · Apply ice to help decrease swelling and pain  Ice may also help prevent tissue damage  Use an ice pack, or put crushed ice in a plastic bag  Cover it with a towel and place it on the painful area for 15 to 20 minutes every hour or as directed  · Do gentle stretching exercises   a doorway and put your hands on the door frame at the level of your ears or shoulders  Take 1 step forward and gently stretch your chest  Try this with your hands higher up on the doorway  Follow up with your healthcare provider as directed:  Write down your questions so you remember to ask them during your visits  © 2017 [employer] [employer] Information is for End User's use only and may not be sold, redistributed or otherwise used for commercial purposes  All illustrations and images included in CareNotes® are the copyrighted property of [employer]  or [employer]  The above information is an  only  It is not intended as medical advice for individual conditions or treatments  Talk to your doctor, nurse or pharmacist before following any medical regimen to see if it is safe and effective for you

## 2020-01-02 NOTE — PROGRESS NOTES
FAMILY MEDICINE PROGRESS NOTE  Jeannette Yadielanamika Stewart 71 y o  female   DATE: January 2, 2020     ASSESSMENT and PLAN:  Arleth Pace is a 71 y o  female with:     Problem List Items Addressed This Visit     None      Visit Diagnoses     Costochondritis, acute    -  Primary    Relevant Medications    benzonatate (TESSALON PERLES) 100 mg capsule      Likely acute infectious process has resolved, and now has post-infectious cough  Normal breast exam, advised she likely has costochondritis  No rash to suggest shingles, but if develops rash, should be seen  Can continue codeine AC PRN, also add Tessalon perles PRN  Patient agreeable with the plan and expressed understanding  I discucssed signs and symptoms for which to RTC, go to ER or seek urgent medical care  SUBJECTIVE:  Arleth Pace is a 71 y o  female who presents today with a chief complaint of Cough and Breast Pain  Pt seen on 12/20 and diagnosed with viral nasopharyngitis and had persistent symptoms so called in on 12/27/19 and given Rx for Zpak and codeine cough syrup  But since then, she started with pain in her chest/breast  Starts as a band like pain in her ribcage area, now is only intermittent  No rash  Cough   This is a new problem  The current episode started 1 to 4 weeks ago  The problem has been unchanged  The cough is non-productive  Associated symptoms include postnasal drip  Pertinent negatives include no chest pain, ear congestion, fever, hemoptysis, nasal congestion, sore throat or shortness of breath  She has tried prescription cough suppressant, OTC cough suppressant and a beta-agonist inhaler for the symptoms  Review of Systems   Constitutional: Negative for fever  HENT: Positive for postnasal drip  Negative for sore throat  Respiratory: Positive for cough  Negative for hemoptysis and shortness of breath  Cardiovascular: Negative for chest pain  I have reviewed the patient's Past Medical History      OBJECTIVE:  BP (!) 110/48   Pulse 98   Temp 99 2 °F (37 3 °C)   Resp 16   Wt 68 9 kg (152 lb)   SpO2 97%   BMI 26 09 kg/m²    Physical Exam   Constitutional: She appears well-developed and well-nourished  No distress  HENT:   Head: Normocephalic and atraumatic  Right Ear: External ear normal    Left Ear: External ear normal    Nose: Right sinus exhibits no maxillary sinus tenderness and no frontal sinus tenderness  Left sinus exhibits no maxillary sinus tenderness and no frontal sinus tenderness  Mouth/Throat: Uvula is midline, oropharynx is clear and moist and mucous membranes are normal  No oropharyngeal exudate, posterior oropharyngeal edema, posterior oropharyngeal erythema or tonsillar abscesses  Eyes: Conjunctivae are normal  Right eye exhibits no discharge  Left eye exhibits no discharge  Neck: Normal range of motion  Neck supple  Cardiovascular: Normal rate and normal heart sounds  Pulmonary/Chest: Effort normal and breath sounds normal  No respiratory distress  She has no wheezes  She has no rales  She exhibits no tenderness  Lymphadenopathy:     She has no cervical adenopathy  Skin: She is not diaphoretic  Vitals reviewed  Frankie Zheng MD    Note: Portions of the record have been created with voice recognition software  Occasional wrong word or "sound a like" substitutions may have occurred due to the inherent limitations of voice recognition software  Read the chart carefully and recognize, using context, where substitutions have occurred

## 2020-02-06 ENCOUNTER — TELEPHONE (OUTPATIENT)
Dept: FAMILY MEDICINE CLINIC | Facility: CLINIC | Age: 65
End: 2020-02-06

## 2020-02-06 NOTE — TELEPHONE ENCOUNTER
Spoke with patient  She needs to go to the pharmacy because she has a medicare replacement plan  She will call the pharmacy

## 2020-02-06 NOTE — TELEPHONE ENCOUNTER
Lm for Patient to return  Call  Patient is next on shingles list  Please ask patient if she would like to schedule and if so please let me know so I can take her off the list  Also please remind patient to check insurance for coverage on vaccine

## 2020-02-26 ENCOUNTER — APPOINTMENT (OUTPATIENT)
Dept: LAB | Facility: CLINIC | Age: 65
End: 2020-02-26
Payer: COMMERCIAL

## 2020-02-26 ENCOUNTER — OFFICE VISIT (OUTPATIENT)
Dept: FAMILY MEDICINE CLINIC | Facility: CLINIC | Age: 65
End: 2020-02-26
Payer: COMMERCIAL

## 2020-02-26 VITALS
RESPIRATION RATE: 16 BRPM | SYSTOLIC BLOOD PRESSURE: 120 MMHG | TEMPERATURE: 98 F | DIASTOLIC BLOOD PRESSURE: 70 MMHG | BODY MASS INDEX: 25.27 KG/M2 | OXYGEN SATURATION: 99 % | HEART RATE: 70 BPM | HEIGHT: 64 IN | WEIGHT: 148 LBS

## 2020-02-26 DIAGNOSIS — E78.00 PURE HYPERCHOLESTEROLEMIA: ICD-10-CM

## 2020-02-26 DIAGNOSIS — J98.01 BRONCHOSPASM, ACUTE: Primary | ICD-10-CM

## 2020-02-26 DIAGNOSIS — E55.9 VITAMIN D DEFICIENCY: ICD-10-CM

## 2020-02-26 DIAGNOSIS — Z11.59 NEED FOR HEPATITIS C SCREENING TEST: ICD-10-CM

## 2020-02-26 DIAGNOSIS — G62.9 NEUROPATHY: ICD-10-CM

## 2020-02-26 DIAGNOSIS — Z13.6 SCREENING FOR CARDIOVASCULAR CONDITION: ICD-10-CM

## 2020-02-26 PROCEDURE — 99214 OFFICE O/P EST MOD 30 MIN: CPT | Performed by: FAMILY MEDICINE

## 2020-02-26 PROCEDURE — 3074F SYST BP LT 130 MM HG: CPT | Performed by: FAMILY MEDICINE

## 2020-02-26 PROCEDURE — 4040F PNEUMOC VAC/ADMIN/RCVD: CPT | Performed by: FAMILY MEDICINE

## 2020-02-26 PROCEDURE — 3008F BODY MASS INDEX DOCD: CPT | Performed by: FAMILY MEDICINE

## 2020-02-26 PROCEDURE — 1036F TOBACCO NON-USER: CPT | Performed by: FAMILY MEDICINE

## 2020-02-26 PROCEDURE — 3078F DIAST BP <80 MM HG: CPT | Performed by: FAMILY MEDICINE

## 2020-02-26 RX ORDER — BENZONATATE 100 MG/1
100 CAPSULE ORAL 3 TIMES DAILY PRN
Qty: 30 CAPSULE | Refills: 0 | Status: SHIPPED | OUTPATIENT
Start: 2020-02-26 | End: 2020-03-07

## 2020-02-26 NOTE — PROGRESS NOTES
FAMILY MEDICINE PROGRESS NOTE  Tayo Stewart 71 y o  female   DATE: February 26, 2020     ASSESSMENT and PLAN:  Oneida Garcia is a 71 y o  female with:     1  Bronchospasm, acute  Likely secondary to recent viral illness, possibly influenza since symptoms are consistent and she was not vaccinated against the flu  Advised patient to use Albuterol BID and if symptoms persist, may need ICS and/or PO steroids  Refilled Tessalon perles  Consider spirometry once acute illness resolves  Recommended flu shot, pt declined  - benzonatate (TESSALON PERLES) 100 mg capsule; Take 1 capsule (100 mg total) by mouth 3 (three) times a day as needed for cough for up to 10 days  Dispense: 30 capsule; Refill: 0    2  Neuropathy  Normal foot exam, no evidence of neuropathy on exam today, but may have positional impingement given intermittent symptoms  No evidence of fungal or bacterial infection  Check labs to rule out neuropathy and f/u after   - Vitamin B12; Future  - CBC and differential; Future  - Comprehensive metabolic panel; Future  - TSH, 3rd generation with Free T4 reflex; Future  - Magnesium; Future    Patient agreeable with the plan and expressed understanding  I discucssed signs and symptoms for which to RTC, go to ER or seek urgent medical care  SUBJECTIVE:  Oneida Garcia is a 71 y o  female who presents today with a chief complaint of Cough; Fever; Generalized Body Aches; and Headache  Pt here with 2 issues:  1  Started all of a sudden 5 days ago with sore throat, aches, chills, cough, not sure of if she had fevers because she didn't have a thermometer  She has been feeling fatigued as well  She started feeling tight with her cough yesterday, so tried her albuterol inhaler, which did help, but made her feel a little lightheaded  2  Every once in a while she gets a burning/itching/tinglign sensation in her bilateral feet  She feels pressure on her feet make it worse   She says she can weeks without having it and other times it is there a few days in a row  This has been going on for months  Review of Systems   Constitutional: Positive for chills and fatigue  Negative for fever  HENT: Positive for congestion and sore throat  Negative for ear pain, rhinorrhea, sinus pressure, sinus pain and sneezing  Respiratory: Positive for cough, chest tightness and wheezing  Negative for shortness of breath  Cardiovascular: Negative for chest pain  Gastrointestinal: Negative for diarrhea, nausea and vomiting  I have reviewed the patient's Past Medical History  OBJECTIVE:  /70   Pulse 70   Temp 98 °F (36 7 °C)   Resp 16   Ht 5' 4" (1 626 m)   Wt 67 1 kg (148 lb)   SpO2 99%   BMI 25 40 kg/m²    Physical Exam   Constitutional: She appears well-developed and well-nourished  No distress  HENT:   Head: Normocephalic and atraumatic  Right Ear: External ear normal    Left Ear: External ear normal    Nose: Right sinus exhibits no maxillary sinus tenderness and no frontal sinus tenderness  Left sinus exhibits no maxillary sinus tenderness and no frontal sinus tenderness  Mouth/Throat: Uvula is midline, oropharynx is clear and moist and mucous membranes are normal  No oropharyngeal exudate, posterior oropharyngeal edema, posterior oropharyngeal erythema or tonsillar abscesses  Eyes: Conjunctivae are normal  Right eye exhibits no discharge  Left eye exhibits no discharge  Neck: Normal range of motion  Neck supple  Cardiovascular: Normal rate and normal heart sounds  Pulses are no weak pulses  Pulses:       Dorsalis pedis pulses are 2+ on the right side, and 2+ on the left side  Posterior tibial pulses are 2+ on the right side, and 2+ on the left side  Pulmonary/Chest: Effort normal  No respiratory distress  She has wheezes (diffuse end-expiratory wheezing)  She has no rales  Feet:   Right Foot:   Skin Integrity: Negative for ulcer, skin breakdown, erythema, warmth, callus or dry skin  Left Foot:   Skin Integrity: Negative for ulcer, skin breakdown, erythema, warmth, callus or dry skin  Lymphadenopathy:     She has no cervical adenopathy  Skin: She is not diaphoretic  Vitals reviewed  Patient's shoes and socks removed  Right Foot/Ankle   Right Foot Inspection  Skin Exam: skin normal and skin intact no dry skin, no warmth, no callus, no erythema, no maceration, no abnormal color, no pre-ulcer, no ulcer and no callus                          Toe Exam: ROM and strength within normal limits  Sensory     Proprioception: intact   Monofilament testing: intact  Vascular  Capillary refills: < 3 seconds  The right DP pulse is 2+  The right PT pulse is 2+  Left Foot/Ankle  Left Foot Inspection  Skin Exam: skin normal and skin intactno dry skin, no warmth, no erythema, no maceration, normal color, no pre-ulcer, no ulcer and no callus                         Toe Exam: ROM and strength within normal limits                   Sensory     Proprioception: intact  Monofilament: intact  Vascular  Capillary refills: < 3 seconds  The left DP pulse is 2+  The left PT pulse is 2+  Assign Risk Category:  No deformity present; No loss of protective sensation; No weak pulses       Risk: 0      BMI Counseling: Body mass index is 25 4 kg/m²  The BMI is above normal  Nutrition recommendations include encouraging healthy choices of fruits and vegetables  Lana Torres MD    Note: Portions of the record have been created with voice recognition software  Occasional wrong word or "sound a like" substitutions may have occurred due to the inherent limitations of voice recognition software  Read the chart carefully and recognize, using context, where substitutions have occurred

## 2020-02-27 ENCOUNTER — APPOINTMENT (OUTPATIENT)
Dept: LAB | Facility: CLINIC | Age: 65
End: 2020-02-27
Payer: COMMERCIAL

## 2020-02-27 ENCOUNTER — TELEPHONE (OUTPATIENT)
Dept: FAMILY MEDICINE CLINIC | Facility: CLINIC | Age: 65
End: 2020-02-27

## 2020-02-27 DIAGNOSIS — D72.818 OTHER DECREASED WHITE BLOOD CELL (WBC) COUNT: Primary | ICD-10-CM

## 2020-02-27 DIAGNOSIS — G62.9 NEUROPATHY: ICD-10-CM

## 2020-02-27 PROBLEM — R73.01 IFG (IMPAIRED FASTING GLUCOSE): Status: ACTIVE | Noted: 2020-02-27

## 2020-02-27 LAB
25(OH)D3 SERPL-MCNC: 57.2 NG/ML (ref 30–100)
ALBUMIN SERPL BCP-MCNC: 3.8 G/DL (ref 3.5–5)
ALP SERPL-CCNC: 62 U/L (ref 46–116)
ALT SERPL W P-5'-P-CCNC: 35 U/L (ref 12–78)
ANION GAP SERPL CALCULATED.3IONS-SCNC: 5 MMOL/L (ref 4–13)
AST SERPL W P-5'-P-CCNC: 18 U/L (ref 5–45)
BASOPHILS # BLD AUTO: 0.02 THOUSANDS/ΜL (ref 0–0.1)
BASOPHILS NFR BLD AUTO: 1 % (ref 0–1)
BILIRUB SERPL-MCNC: 0.54 MG/DL (ref 0.2–1)
BUN SERPL-MCNC: 9 MG/DL (ref 5–25)
CALCIUM SERPL-MCNC: 9.1 MG/DL (ref 8.3–10.1)
CHLORIDE SERPL-SCNC: 106 MMOL/L (ref 100–108)
CHOLEST SERPL-MCNC: 190 MG/DL (ref 50–200)
CO2 SERPL-SCNC: 29 MMOL/L (ref 21–32)
CREAT SERPL-MCNC: 0.71 MG/DL (ref 0.6–1.3)
EOSINOPHIL # BLD AUTO: 0.03 THOUSAND/ΜL (ref 0–0.61)
EOSINOPHIL NFR BLD AUTO: 1 % (ref 0–6)
ERYTHROCYTE [DISTWIDTH] IN BLOOD BY AUTOMATED COUNT: 13.9 % (ref 11.6–15.1)
GFR SERPL CREATININE-BSD FRML MDRD: 90 ML/MIN/1.73SQ M
GLUCOSE P FAST SERPL-MCNC: 101 MG/DL (ref 65–99)
HCT VFR BLD AUTO: 40.7 % (ref 34.8–46.1)
HCV AB SER QL: NORMAL
HDLC SERPL-MCNC: 46 MG/DL
HGB BLD-MCNC: 13.8 G/DL (ref 11.5–15.4)
IMM GRANULOCYTES # BLD AUTO: 0 THOUSAND/UL (ref 0–0.2)
IMM GRANULOCYTES NFR BLD AUTO: 0 % (ref 0–2)
LDLC SERPL CALC-MCNC: 128 MG/DL (ref 0–100)
LYMPHOCYTES # BLD AUTO: 1.92 THOUSANDS/ΜL (ref 0.6–4.47)
LYMPHOCYTES NFR BLD AUTO: 56 % (ref 14–44)
MAGNESIUM SERPL-MCNC: 2.5 MG/DL (ref 1.6–2.6)
MCH RBC QN AUTO: 33.7 PG (ref 26.8–34.3)
MCHC RBC AUTO-ENTMCNC: 33.9 G/DL (ref 31.4–37.4)
MCV RBC AUTO: 100 FL (ref 82–98)
MONOCYTES # BLD AUTO: 0.51 THOUSAND/ΜL (ref 0.17–1.22)
MONOCYTES NFR BLD AUTO: 15 % (ref 4–12)
NEUTROPHILS # BLD AUTO: 0.9 THOUSANDS/ΜL (ref 1.85–7.62)
NEUTS SEG NFR BLD AUTO: 27 % (ref 43–75)
NRBC BLD AUTO-RTO: 0 /100 WBCS
PLATELET # BLD AUTO: 217 THOUSANDS/UL (ref 149–390)
PMV BLD AUTO: 11.6 FL (ref 8.9–12.7)
POTASSIUM SERPL-SCNC: 4.2 MMOL/L (ref 3.5–5.3)
PROT SERPL-MCNC: 7.3 G/DL (ref 6.4–8.2)
RBC # BLD AUTO: 4.09 MILLION/UL (ref 3.81–5.12)
SODIUM SERPL-SCNC: 140 MMOL/L (ref 136–145)
TRIGL SERPL-MCNC: 82 MG/DL
TSH SERPL DL<=0.05 MIU/L-ACNC: 2.1 UIU/ML (ref 0.36–3.74)
VIT B12 SERPL-MCNC: 3007 PG/ML (ref 100–900)
WBC # BLD AUTO: 3.38 THOUSAND/UL (ref 4.31–10.16)

## 2020-02-27 PROCEDURE — 36415 COLL VENOUS BLD VENIPUNCTURE: CPT

## 2020-02-27 PROCEDURE — 86803 HEPATITIS C AB TEST: CPT

## 2020-02-27 PROCEDURE — 83735 ASSAY OF MAGNESIUM: CPT

## 2020-02-27 PROCEDURE — 84443 ASSAY THYROID STIM HORMONE: CPT

## 2020-02-27 PROCEDURE — 80061 LIPID PANEL: CPT

## 2020-02-27 PROCEDURE — 80053 COMPREHEN METABOLIC PANEL: CPT

## 2020-02-27 PROCEDURE — 82607 VITAMIN B-12: CPT

## 2020-02-27 PROCEDURE — 82306 VITAMIN D 25 HYDROXY: CPT

## 2020-02-27 PROCEDURE — 85025 COMPLETE CBC W/AUTO DIFF WBC: CPT

## 2020-02-27 NOTE — TELEPHONE ENCOUNTER
Please call [phone] and let her know that her labs were normal, no evidence of neuropathy in lab work, only abnormalities were:  1  Cholesterol and glucose are slightly high, no meds needed at this time, but should follow a healthier plant based diet (lower cholesterol and carb)  2  Her white cells were a little bit low, that likely is because of her recent illness as opposed to an underlying issue since her levels have been normal in the past  I ordered a repeat CBC for 3 months from now when she is healthy and not sick any longer  Thank you! Appointment on 02/27/2020   Component Date Value Ref Range Status    TSH 3RD GENERATON 02/27/2020 2 100  0 358 - 3 740 uIU/mL Final      The recommended reference ranges for TSH during pregnancy are as follows:   First trimester 0 1 to 2 5 uIU/mL   Second trimester  0 2 to 3 0 uIU/mL   Third trimester 0 3 to 3 0 uIU/m    Note: Normal ranges may not apply to patients who are transgender, non-binary, or whose legal sex, sex at birth, and gender identity differ  Using supplements with high doses of biotin 20 to more than 300 times greater than the adequate daily intake for adults of 30 mcg/day as established by the [place] [employer], can cause falsely depress results      Magnesium 02/27/2020 2 5  1 6 - 2 6 mg/dL Final   Appointment on 02/26/2020   Component Date Value Ref Range Status    Hepatitis C Ab 02/27/2020 Non-reactive  Non-reactive Final    Vit D, 25-Hydroxy 02/27/2020 57 2  30 0 - 100 0 ng/mL Final    Cholesterol 02/27/2020 190  50 - 200 mg/dL Final      Cholesterol:       Desirable         <200 mg/dl       Borderline         200-239 mg/dl       High              >239           Triglycerides 02/27/2020 82  <=150 mg/dL Final      Triglyceride:     Normal          <150 mg/dl     Borderline High 150-199 mg/dl     High            200-499 mg/dl        Very High       >499 mg/dl    Specimen collection should occur prior to N-Acetylcysteine or Metamizole administration due to the potential for falsely depressed results   HDL, Direct 02/27/2020 46  >=40 mg/dL Final      HDL Cholesterol:       Low     <41 mg/dL  Specimen collection should occur prior to Metamizole administration due to the potential for falsley depressed results   LDL Calculated 02/27/2020 128* 0 - 100 mg/dL Final      LDL Cholesterol:     Optimal           <100 mg/dl     Near Optimal      100-129 mg/dl     Above Optimal       Borderline High 130-159 mg/dl       High            160-189 mg/dl       Very High       >189 mg/dl         This screening LDL is a calculated result  It does not have the accuracy of the Direct Measured LDL in the monitoring of patients with hyperlipidemia and/or statin therapy  Direct Measure LDL (DJD563) must be ordered separately in these patients      Vitamin B-12 02/27/2020 3,007* 100 - 900 pg/mL Final    WBC 02/27/2020 3 38* 4 31 - 10 16 Thousand/uL Final    RBC 02/27/2020 4 09  3 81 - 5 12 Million/uL Final    Hemoglobin 02/27/2020 13 8  11 5 - 15 4 g/dL Final    Hematocrit 02/27/2020 40 7  34 8 - 46 1 % Final    MCV 02/27/2020 100* 82 - 98 fL Final    MCH 02/27/2020 33 7  26 8 - 34 3 pg Final    MCHC 02/27/2020 33 9  31 4 - 37 4 g/dL Final    RDW 02/27/2020 13 9  11 6 - 15 1 % Final    MPV 02/27/2020 11 6  8 9 - 12 7 fL Final    Platelets 16/70/1031 217  149 - 390 Thousands/uL Final    nRBC 02/27/2020 0  /100 WBCs Final    Neutrophils Relative 02/27/2020 27* 43 - 75 % Final    Immat GRANS % 02/27/2020 0  0 - 2 % Final    Lymphocytes Relative 02/27/2020 56* 14 - 44 % Final    Monocytes Relative 02/27/2020 15* 4 - 12 % Final    Eosinophils Relative 02/27/2020 1  0 - 6 % Final    Basophils Relative 02/27/2020 1  0 - 1 % Final    Neutrophils Absolute 02/27/2020 0 90* 1 85 - 7 62 Thousands/µL Final    Immature Grans Absolute 02/27/2020 0 00  0 00 - 0 20 Thousand/uL Final    Lymphocytes Absolute 02/27/2020 1 92  0 60 - 4 47 Thousands/µL Final    Monocytes Absolute 02/27/2020 0 51  0 17 - 1 22 Thousand/µL Final    Eosinophils Absolute 02/27/2020 0 03  0 00 - 0 61 Thousand/µL Final    Basophils Absolute 02/27/2020 0 02  0 00 - 0 10 Thousands/µL Final    Sodium 02/27/2020 140  136 - 145 mmol/L Final    Potassium 02/27/2020 4 2  3 5 - 5 3 mmol/L Final    Chloride 02/27/2020 106  100 - 108 mmol/L Final    CO2 02/27/2020 29  21 - 32 mmol/L Final    ANION GAP 02/27/2020 5  4 - 13 mmol/L Final    BUN 02/27/2020 9  5 - 25 mg/dL Final    Creatinine 02/27/2020 0 71  0 60 - 1 30 mg/dL Final    Standardized to IDMS reference method    Glucose, Fasting 02/27/2020 101* 65 - 99 mg/dL Final      Specimen collection should occur prior to Sulfasalazine administration due to the potential for falsely depressed results  Specimen collection should occur prior to Sulfapyridine administration due to the potential for falsely elevated results   Calcium 02/27/2020 9 1  8 3 - 10 1 mg/dL Final    AST 02/27/2020 18  5 - 45 U/L Final      Specimen collection should occur prior to Sulfasalazine administration due to the potential for falsely depressed results   ALT 02/27/2020 35  12 - 78 U/L Final      Specimen collection should occur prior to Sulfasalazine and/or Sulfapyridine administration due to the potential for falsely depressed results       Alkaline Phosphatase 02/27/2020 62  46 - 116 U/L Final    Total Protein 02/27/2020 7 3  6 4 - 8 2 g/dL Final    Albumin 02/27/2020 3 8  3 5 - 5 0 g/dL Final    Total Bilirubin 02/27/2020 0 54  0 20 - 1 00 mg/dL Final    eGFR 02/27/2020 90  ml/min/1 73sq m Final

## 2020-05-27 ENCOUNTER — APPOINTMENT (OUTPATIENT)
Dept: LAB | Facility: CLINIC | Age: 65
End: 2020-05-27
Payer: COMMERCIAL

## 2020-05-27 DIAGNOSIS — D72.818 OTHER DECREASED WHITE BLOOD CELL (WBC) COUNT: ICD-10-CM

## 2020-05-27 LAB
BASOPHILS # BLD AUTO: 0.05 THOUSANDS/ΜL (ref 0–0.1)
BASOPHILS NFR BLD AUTO: 1 % (ref 0–1)
EOSINOPHIL # BLD AUTO: 0.17 THOUSAND/ΜL (ref 0–0.61)
EOSINOPHIL NFR BLD AUTO: 3 % (ref 0–6)
ERYTHROCYTE [DISTWIDTH] IN BLOOD BY AUTOMATED COUNT: 12.9 % (ref 11.6–15.1)
HCT VFR BLD AUTO: 43.1 % (ref 34.8–46.1)
HGB BLD-MCNC: 14 G/DL (ref 11.5–15.4)
IMM GRANULOCYTES # BLD AUTO: 0.01 THOUSAND/UL (ref 0–0.2)
IMM GRANULOCYTES NFR BLD AUTO: 0 % (ref 0–2)
LYMPHOCYTES # BLD AUTO: 2.8 THOUSANDS/ΜL (ref 0.6–4.47)
LYMPHOCYTES NFR BLD AUTO: 48 % (ref 14–44)
MCH RBC QN AUTO: 32.4 PG (ref 26.8–34.3)
MCHC RBC AUTO-ENTMCNC: 32.5 G/DL (ref 31.4–37.4)
MCV RBC AUTO: 100 FL (ref 82–98)
MONOCYTES # BLD AUTO: 0.64 THOUSAND/ΜL (ref 0.17–1.22)
MONOCYTES NFR BLD AUTO: 11 % (ref 4–12)
NEUTROPHILS # BLD AUTO: 2.17 THOUSANDS/ΜL (ref 1.85–7.62)
NEUTS SEG NFR BLD AUTO: 37 % (ref 43–75)
NRBC BLD AUTO-RTO: 0 /100 WBCS
PLATELET # BLD AUTO: 212 THOUSANDS/UL (ref 149–390)
PMV BLD AUTO: 11.4 FL (ref 8.9–12.7)
RBC # BLD AUTO: 4.32 MILLION/UL (ref 3.81–5.12)
WBC # BLD AUTO: 5.84 THOUSAND/UL (ref 4.31–10.16)

## 2020-05-27 PROCEDURE — 85025 COMPLETE CBC W/AUTO DIFF WBC: CPT

## 2020-05-27 PROCEDURE — 36415 COLL VENOUS BLD VENIPUNCTURE: CPT

## 2020-09-11 ENCOUNTER — OFFICE VISIT (OUTPATIENT)
Dept: FAMILY MEDICINE CLINIC | Facility: CLINIC | Age: 65
End: 2020-09-11
Payer: COMMERCIAL

## 2020-09-11 VITALS
HEIGHT: 64 IN | HEART RATE: 104 BPM | SYSTOLIC BLOOD PRESSURE: 132 MMHG | TEMPERATURE: 98.2 F | WEIGHT: 147.5 LBS | BODY MASS INDEX: 25.18 KG/M2 | DIASTOLIC BLOOD PRESSURE: 80 MMHG | RESPIRATION RATE: 18 BRPM | OXYGEN SATURATION: 99 %

## 2020-09-11 DIAGNOSIS — Z23 ENCOUNTER FOR IMMUNIZATION: ICD-10-CM

## 2020-09-11 DIAGNOSIS — M67.912 ROTATOR CUFF DYSFUNCTION, LEFT: Primary | ICD-10-CM

## 2020-09-11 DIAGNOSIS — M89.9 SCAPULAR DYSFUNCTION: ICD-10-CM

## 2020-09-11 DIAGNOSIS — L57.0 ACTINIC KERATOSIS: ICD-10-CM

## 2020-09-11 DIAGNOSIS — R07.89 RIGHT-SIDED CHEST WALL PAIN: ICD-10-CM

## 2020-09-11 DIAGNOSIS — L82.1 SEBORRHEIC KERATOSIS: ICD-10-CM

## 2020-09-11 PROCEDURE — 90732 PPSV23 VACC 2 YRS+ SUBQ/IM: CPT

## 2020-09-11 PROCEDURE — 99214 OFFICE O/P EST MOD 30 MIN: CPT | Performed by: FAMILY MEDICINE

## 2020-09-11 PROCEDURE — G0009 ADMIN PNEUMOCOCCAL VACCINE: HCPCS

## 2020-09-11 RX ORDER — ACETAMINOPHEN 160 MG
2000 TABLET,DISINTEGRATING ORAL DAILY
COMMUNITY

## 2020-09-11 RX ORDER — CHLORAL HYDRATE 500 MG
1290 CAPSULE ORAL DAILY
COMMUNITY

## 2020-09-11 NOTE — PROGRESS NOTES
Skin Lesions     Shoulder Pain  She has had pain in her shoulder and was treated with physical therapy in the past, almost two years ago  She reports she had improvement in symptoms, but she feels some instability now  She denies any previous injury to her shoulder  She does not have difficulty reaching overhead, putting on clothes, she reports that she had full range of motion in her right shoulder  Breast Pain-right sided  She reports symptoms came on gradually, she feels a fullness in her breast on the right  The pain is an aching, radiates to her axilla  The pain comes and goes  She is unsure of any inciting factors, but she has been reading and drawing a lot more this summer  She has no first or secondary degree family memembers with breast cancer, endometrial cancer, or ovarian cancer  In addition she reports that she feels tired all the time  She also report more frequent urination  She denies fevers, chills, chest pain, shortness of breath, unintentional weight loss

## 2020-09-11 NOTE — PROGRESS NOTES
FAMILY MEDICINE PROGRESS NOTE    Date of Service: 20  Primary Care Provider:   Kyung Stewart MD       Name: Navin Marx       : 1955       Age:65 y o  Sex: female      MRN: [id number]      Chief Complaint:Shoulder Pain (Right sided pain); Breast Pain (Right sided); and Chest Pain (Right sided)       ASSESSMENT and PLAN:  Navin Marx is a 76 y o  female with:     1  Rotator cuff dysfunction, left  No localizing signs of impingement or instability, though right shoulder is positioned lower than the left shoulder  Patient would benefit from PT to address dysfunction, improve shoulder stability, as this is likely causing her to have inflammation in her serratus anterior muscle which is the likely cause of her side pain  - Ambulatory referral to Physical Therapy; Future    2  Scapular dysfunction  - Ambulatory referral to Physical Therapy; Future    3  Right-sided chest wall pain  Referral to PT, side pain appears to be MSK in nature, specifically serratus anterior  Recommended topical analgesics, OTC NSAIDs, Tylenol for pain  4  Seborrheic keratosis  Reassured patient that this is a benign lesion  5  Actinic keratosis  Can plan to treat with liquid nitrogen  6  Encounter for immunization  - PNEUMOCOCCAL POLYSACCHARIDE VACCINE 23-VALENT =>1YO SQ IM    SUBJECTIVE:  Navin Marx is a 76 y o  female who presents today with a chief complaint of Shoulder Pain (Right sided pain); Breast Pain (Right sided); and Chest Pain (Right sided)  Shoulder Pain  She has had pain in her shoulder previously and was treated with physical therapy in the past, almost two years ago  She reports she had improvement in symptoms, but she feels some instability now and she frequently draws her shoulder back and squeezes her scapulae together to feel stability  She denies having shoulder pain She denies any previous injury to her shoulder   She does not have difficulty reaching overhead, putting on clothes, she reports that she had full range of motion in her right shoulder  She does not have neck pain, she does not have symptoms that radiate past her elbow       Breast Pain-right sided  She reports symptoms came on gradually, she feels a fullness in her breast on the right  The pain is an aching, radiates to her axilla  The pain comes and goes  She is unsure of any inciting factors, but she has been reading and drawing a lot more this summer with her right hand  She denies having palpable breast masses, nipple discharge  She has no first or secondary degree family memembers with breast cancer, endometrial cancer, or ovarian cancer      In addition she reports that she feels tired all the time  She also report more frequent urination  She also reports some moles she would like evaluated       She denies fevers, chills, chest pain, shortness of breath, unintentional weight loss  Review of Systems   Constitutional: Negative for activity change, appetite change, chills, fever and unexpected weight change  Respiratory: Negative for shortness of breath  Cardiovascular: Negative for chest pain  Musculoskeletal: Positive for arthralgias  Negative for neck pain and neck stiffness  Skin:        moles   Neurological: Negative for weakness and numbness  I have reviewed the patient's Past Medical History      Current Outpatient Medications:     BIOTIN 5000 PO, Take 1 tablet by mouth daily, Disp: , Rfl:     Cholecalciferol (Vitamin D3) 50 MCG (2000 UT) capsule, Take 2,000 Units by mouth daily, Disp: , Rfl:     Cyanocobalamin (BL VITAMIN B-12 PO), Take 1 tablet by mouth daily, Disp: , Rfl:     ferrous sulfate 325 (65 Fe) mg tablet, Take 1 tablet by mouth every other day , Disp: , Rfl:     Omega-3 1000 MG CAPS, Take 1,290 mg by mouth daily, Disp: , Rfl:     OBJECTIVE:  /80 (BP Location: Left arm, Patient Position: Sitting, Cuff Size: Standard)   Pulse 104   Temp 98 2 °F (36 8 °C)   Resp 18  5' 4" (1 626 m)   Wt 66 9 kg (147 lb 8 oz)   SpO2 99%   Breastfeeding No   BMI 25 32 kg/m²    Physical Exam  Vitals signs reviewed  Constitutional:       General: She is not in acute distress  Appearance: She is well-developed  She is not ill-appearing  Neck:      Musculoskeletal: Full passive range of motion without pain, normal range of motion and neck supple  Normal range of motion  No neck rigidity  Chest:      Chest wall: Tenderness (along serratus anterior, mid axillary line) present  No mass, deformity or edema  Breasts:         Right: Normal  No swelling, bleeding, inverted nipple, mass, nipple discharge, skin change or tenderness  Musculoskeletal:      Right shoulder: She exhibits deformity (right shoulder sits lower than left shoulder)  She exhibits normal range of motion, no tenderness, no bony tenderness, no swelling, no crepitus, no pain and normal strength  Comments: Normal passive and active range of motion in right shoulder  Negative Hawkings, Neers, and Apprehension tests  Her scapulae move symmetrically, there is no scapular winging   Lymphadenopathy:      Upper Body:      Right upper body: No axillary or pectoral adenopathy  Skin:     Comments: Small nevus on left temporal area with symmetric and regular borders, benign in appearance  Actinic Keratosis on left forearm  Seborrhea Keratosis on posterior left shoulder   Neurological:      Mental Status: She is alert  Return for return for AWV/physcal      Eliza Churchill MD    Note: Portions of the record have been created with voice recognition software  Occasional wrong word or "sound a like" substitutions may have occurred due to the inherent limitations of voice recognition software  Read the chart carefully and recognize, using context, where substitutions have occurred

## 2020-09-14 ENCOUNTER — EVALUATION (OUTPATIENT)
Dept: PHYSICAL THERAPY | Facility: CLINIC | Age: 65
End: 2020-09-14
Payer: COMMERCIAL

## 2020-09-14 DIAGNOSIS — M89.9 SCAPULAR DYSFUNCTION: ICD-10-CM

## 2020-09-14 DIAGNOSIS — M67.912 ROTATOR CUFF DYSFUNCTION, LEFT: Primary | ICD-10-CM

## 2020-09-14 PROCEDURE — 97140 MANUAL THERAPY 1/> REGIONS: CPT | Performed by: PHYSICAL THERAPIST

## 2020-09-14 PROCEDURE — 97110 THERAPEUTIC EXERCISES: CPT | Performed by: PHYSICAL THERAPIST

## 2020-09-14 PROCEDURE — 97161 PT EVAL LOW COMPLEX 20 MIN: CPT | Performed by: PHYSICAL THERAPIST

## 2020-09-14 NOTE — PROGRESS NOTES
PT Evaluation     Today's date: 2020  Patient name: Brad Razo  : 1955  MRN: [id number]  Referring provider: Jada Guerrero MD  Dx:   Encounter Diagnosis     ICD-10-CM    1  Rotator cuff dysfunction, left  M67 912 Ambulatory referral to Physical Therapy   2  Scapular dysfunction  M89 9 Ambulatory referral to Physical Therapy                  Assessment  Assessment details: Brad Razo is a 76 y o  female who presents with chronic right shoulder pain  Scapular dyskinesis is present but ROM is normal except for IR restriction  There is mild weakness in the right shoulder and widespread hypertonicity in the right tenzin-scapulars  This patient would benefit from skilled physical therapy to address their listed impairments and functional limitations to maximize functional outcome  Impairments: abnormal or restricted ROM, activity intolerance, impaired physical strength, lacks appropriate home exercise program and pain with function     Prognosis: good    Goals  STG Patient is independent with HEP   STG Range of motion is improved by 25% in 2 weeks  LTG ADL performance improved to prior level of function in 4 weeks  LTG Increase strength full grade in 4 weeks        Plan  Planned therapy interventions: manual therapy, joint mobilization, flexibility, graded activity, graded exercise, home exercise program and functional ROM exercises  Frequency: 2x week  Duration in visits: 8  Duration in weeks: 4  Treatment plan discussed with: patient        Subjective Evaluation    History of Present Illness  Mechanism of injury: Patient reports multi-year history of right shoulder pain which she attributes to computer use, reading, and writing  Her shoulder felt better after physical therapy in late  but she is starting to have more pain recently  She feels her shoulder is not stable but denies prior injury  She is able to sleep on there right side      Pain  At worst pain ratin  Location: anterior shoulder, lats, medial to the right scapula  Quality: dull ache and sharp    Patient Goals  Patient goals for therapy: decreased pain, increased motion, increased strength and independence with ADLs/IADLs          Objective     Palpation     Right   Hypertonic in the latissimus, levator scapulae, lower trapezius, middle deltoid, middle trapezius, pectoralis minor, rhomboids and upper trapezius  Tenderness     Right Shoulder  Tenderness in the lateral scapula and medial scapula  Active Range of Motion   Left Shoulder   Internal rotation BTB: T8     Right Shoulder   Flexion: WFL  Extension: 38 degrees   Abduction: WFL  Internal rotation BTB: T8     Passive Range of Motion     Right Shoulder   External rotation 90°: 90 degrees   Internal rotation 90°: 60 degrees     Scapular Mobility     Right Shoulder   Scapular Dyskinesis: grade I    Strength/Myotome Testing     Right Shoulder     Planes of Motion   Flexion: 5   Abduction: 5   External rotation at 0°: 4+   External rotation at 45°: 5   Internal rotation at 0°: 4+     Tests     Right Shoulder   Negative apprehension, Neer's and painful arc       General Comments:      Shoulder Comments   Hypertonic throughout right tenzin-scapulars  Segmental hypomobility T4-T8 thoracic spine      Flowsheet Rows      Most Recent Value   PT/OT G-Codes   Current Score  79   Projected Score  76              Precautions: none      Manuals 9/14            IR stretching SY            scap mobs SY            Thoracic PA mobs + rotation MFR SY                         Neuro Re-Ed             scap press down seated 15            Low row R20            Prone trunk ext with T :05x10                                                                Ther Ex             TB row with ER R20            Thoracic ext over chair :05x15            Lat pulls             CW/CCW MB             TB ER             TB W             Body blade             scaption Ther Activity                                       Gait Training                                       Modalities

## 2020-09-18 ENCOUNTER — OFFICE VISIT (OUTPATIENT)
Dept: PHYSICAL THERAPY | Facility: CLINIC | Age: 65
End: 2020-09-18
Payer: COMMERCIAL

## 2020-09-18 DIAGNOSIS — M89.9 SCAPULAR DYSFUNCTION: ICD-10-CM

## 2020-09-18 DIAGNOSIS — M67.912 ROTATOR CUFF DYSFUNCTION, LEFT: Primary | ICD-10-CM

## 2020-09-18 PROCEDURE — 97110 THERAPEUTIC EXERCISES: CPT | Performed by: PHYSICAL THERAPIST

## 2020-09-18 PROCEDURE — 97112 NEUROMUSCULAR REEDUCATION: CPT | Performed by: PHYSICAL THERAPIST

## 2020-09-18 PROCEDURE — 97140 MANUAL THERAPY 1/> REGIONS: CPT | Performed by: PHYSICAL THERAPIST

## 2020-09-18 NOTE — PROGRESS NOTES
Daily Note     Today's date: 2020  Patient name: Shahrzad Alaniz  : 1955  MRN: [id number]  Referring provider: Maximino Melvin MD  Dx:   Encounter Diagnosis     ICD-10-CM    1  Rotator cuff dysfunction, left  M67 912    2  Scapular dysfunction  M89 9               Subjective: patient reports no soreness after last treatment and she has been consistent with home exercises      Objective: See treatment diary below      Assessment: Tolerated treatment well  Patient exhibited good technique with therapeutic exercises and would benefit from continued PT  No pain reported throughout exercises  Updated HEP      Plan: Progress treatment as tolerated         Precautions: none      Manuals            IR stretching RIGHT SY D/c           scap mobs RIGHT SY SY           Thoracic PA mobs + rotation MFR SY SY                        Neuro Re-Ed             scap press down seated 15 15           Low row R20 R20           Prone trunk ext with T :05x10 Held, low back pain                                                               Ther Ex             TB row with ER R20 R20           Thoracic ext over chair :05x15 No chair active :05x15           Lat pulls             CW/CCW DB standing  scaption 2x15ea           TB ER  R20           TB W  R20           Body blade             scaption                                                                              Ther Activity                                       Gait Training                                       Modalities

## 2020-09-21 ENCOUNTER — OFFICE VISIT (OUTPATIENT)
Dept: PHYSICAL THERAPY | Facility: CLINIC | Age: 65
End: 2020-09-21
Payer: COMMERCIAL

## 2020-09-21 DIAGNOSIS — M67.912 ROTATOR CUFF DYSFUNCTION, LEFT: Primary | ICD-10-CM

## 2020-09-21 DIAGNOSIS — M89.9 SCAPULAR DYSFUNCTION: ICD-10-CM

## 2020-09-21 PROCEDURE — 97140 MANUAL THERAPY 1/> REGIONS: CPT | Performed by: PHYSICAL THERAPIST

## 2020-09-21 PROCEDURE — 97110 THERAPEUTIC EXERCISES: CPT | Performed by: PHYSICAL THERAPIST

## 2020-09-21 NOTE — PROGRESS NOTES
Daily Note     Today's date: 2020  Patient name: Navin Marx  : 1955  MRN: [id number]  Referring provider: Tammi Larsen MD  Dx:   Encounter Diagnosis     ICD-10-CM    1  Rotator cuff dysfunction, left  M67 912    2  Scapular dysfunction  M89 9                   Subjective: patient reports her shoulder is feeling better overall; she had muscle soreness after last treatment      Objective: See treatment diary below      Assessment: Tolerated treatment well  Patient exhibited good technique with therapeutic exercises and would benefit from continued PT  Continued slow progression of upper quarter strengthening activities  Required occasional verbal cues to limit UT activation/shoulder shrugging       Plan: Progress treatment as tolerated         Precautions: none      Manuals           IR stretching RIGHT SY D/c SY          scap mobs RIGHT SY SY SY          Thoracic PA mobs + rotation MFR SY SY SY                       Neuro Re-Ed             scap press down seated 15 15 :05x15          Low row R20 R20           Prone trunk ext with T :05x10 Held, low back pain                                                               Ther Ex             TB row with ER R20 R20           Thoracic ext over chair :05x15 No chair active :05x15 No chair active :05x15          Lat pulls   #19 30x          CW/CCW DB standing  Scapular plane 2x15ea 2x20 yMB          TB ER  R20 R30          TB W  R20 R30          Body blade             scaption             Row with ER   vinicius #3 9 30x                                                              Ther Activity                                       Gait Training                                       Modalities

## 2020-09-24 ENCOUNTER — OFFICE VISIT (OUTPATIENT)
Dept: PHYSICAL THERAPY | Facility: CLINIC | Age: 65
End: 2020-09-24
Payer: COMMERCIAL

## 2020-09-24 DIAGNOSIS — M89.9 SCAPULAR DYSFUNCTION: ICD-10-CM

## 2020-09-24 DIAGNOSIS — M67.912 ROTATOR CUFF DYSFUNCTION, LEFT: Primary | ICD-10-CM

## 2020-09-24 PROCEDURE — 97140 MANUAL THERAPY 1/> REGIONS: CPT

## 2020-09-24 PROCEDURE — 97112 NEUROMUSCULAR REEDUCATION: CPT

## 2020-09-24 PROCEDURE — 97110 THERAPEUTIC EXERCISES: CPT

## 2020-09-24 NOTE — PROGRESS NOTES
Daily Note     Today's date: 2020  Patient name: Navin Marx  : 1955  MRN: [id number]  Referring provider: Tammi Larsen MD  Dx:   Encounter Diagnosis     ICD-10-CM    1  Rotator cuff dysfunction, left  M67 912    2  Scapular dysfunction  M89 9                   Subjective: Patient reports she felt something shift in her shoulder when sleeping on R side last night and her shoulder feels "different but better" today  Objective: See treatment diary below      Assessment: Tolerated treatment well  Fatigued quickly with uni ER using RTB  No increased symptoms reported throughout session  Patient demonstrated fatigue post treatment, exhibited good technique with therapeutic exercises and would benefit from continued PT      Plan: Progress treatment as tolerated          Precautions: none      Manuals          IR stretching RIGHT SY D/c SY SY         scap mobs RIGHT SY SY SY SY         Thoracic PA mobs + rotation MFR SY SY SY SY                      Neuro Re-Ed             scap press down seated 15 15 :05x15 :05x15         Low row R20 R20           Prone trunk ext with T :05x10 Held, low back pain                                                               Ther Ex             TB row with ER R20 R20           Thoracic ext over chair :05x15 No chair active :05x15 No chair active :05x15 :05x15         Lat pulls   #19 30x 19# 30         CW/CCW DB standing  Scapular plane 2x15ea 2x20 yMB 2x20 rmb         TB ER  R20 R30 R30         TB W  R20 R30 R30         Body blade             scaption             Row with ER   vinicius #3 9 30x 3 9# 30                                                             Ther Activity                                       Gait Training                                       Modalities

## 2020-09-28 ENCOUNTER — OFFICE VISIT (OUTPATIENT)
Dept: PHYSICAL THERAPY | Facility: CLINIC | Age: 65
End: 2020-09-28
Payer: COMMERCIAL

## 2020-09-28 DIAGNOSIS — M67.912 ROTATOR CUFF DYSFUNCTION, LEFT: Primary | ICD-10-CM

## 2020-09-28 DIAGNOSIS — M89.9 SCAPULAR DYSFUNCTION: ICD-10-CM

## 2020-09-28 PROCEDURE — 97112 NEUROMUSCULAR REEDUCATION: CPT | Performed by: PHYSICAL THERAPIST

## 2020-09-28 PROCEDURE — 97140 MANUAL THERAPY 1/> REGIONS: CPT | Performed by: PHYSICAL THERAPIST

## 2020-09-28 PROCEDURE — 97110 THERAPEUTIC EXERCISES: CPT | Performed by: PHYSICAL THERAPIST

## 2020-09-28 NOTE — PROGRESS NOTES
Daily Note     Today's date: 2020  Patient name: Joesph De Santiago  : 1955  MRN: [id number]  Referring provider: Aashish Narayanan MD  Dx:   Encounter Diagnosis     ICD-10-CM    1  Rotator cuff dysfunction, left  M67 912    2  Scapular dysfunction  M89 9                   Subjective: patient reports her shoulder is feeling better and she has been consistent with stretches at home      Objective: See treatment diary below      Assessment: Tolerated treatment well  Patient exhibited good technique with therapeutic exercises and would benefit from continued PT  She will hold on strengthening activities at home to allow for 2 days of recovery  No pain reported throughout treatment      Plan: Progress treatment as tolerated         Precautions: none      Manuals         IR stretching RIGHT SY D/c SY SY SY        scap mobs RIGHT SY SY SY SY SY        Thoracic PA mobs + rotation MFR SY SY SY SY SY                     Neuro Re-Ed             scap press down seated 15 15 :05x15 :05x15 :05x15        Low row R20 R20           Prone trunk ext with T :05x10 Held, low back pain                                                               Ther Ex             TB row with ER R20 R20           Thoracic ext over chair :05x15 No chair active :05x15 No chair active :05x15 :05x15 :05x15        Rows     #14        Lat pulls   #19 30x 19# 30 19# 30        CW/CCW DB standing  Scapular plane 2x15ea 2x20 yMB 2x20 rmb 2x20ea                                  TB IR     R30        TB ER  R20 R30 R30 R30        TB W  R20 R30 R30 R30        Body blade             scaption             Row with ER   vinicius #3 9 30x 3 9# 30 R30        sidelying ER     #2  20                                               Ther Activity                                       Gait Training                                       Modalities

## 2020-10-01 ENCOUNTER — OFFICE VISIT (OUTPATIENT)
Dept: PHYSICAL THERAPY | Facility: CLINIC | Age: 65
End: 2020-10-01
Payer: COMMERCIAL

## 2020-10-01 DIAGNOSIS — M67.912 ROTATOR CUFF DYSFUNCTION, LEFT: Primary | ICD-10-CM

## 2020-10-01 DIAGNOSIS — M89.9 SCAPULAR DYSFUNCTION: ICD-10-CM

## 2020-10-01 PROCEDURE — 97140 MANUAL THERAPY 1/> REGIONS: CPT | Performed by: PHYSICAL THERAPIST

## 2020-10-01 PROCEDURE — 97112 NEUROMUSCULAR REEDUCATION: CPT | Performed by: PHYSICAL THERAPIST

## 2020-10-01 PROCEDURE — 97110 THERAPEUTIC EXERCISES: CPT | Performed by: PHYSICAL THERAPIST

## 2020-10-05 ENCOUNTER — OFFICE VISIT (OUTPATIENT)
Dept: PHYSICAL THERAPY | Facility: CLINIC | Age: 65
End: 2020-10-05
Payer: COMMERCIAL

## 2020-10-05 DIAGNOSIS — M67.912 ROTATOR CUFF DYSFUNCTION, LEFT: Primary | ICD-10-CM

## 2020-10-05 DIAGNOSIS — M89.9 SCAPULAR DYSFUNCTION: ICD-10-CM

## 2020-10-05 PROCEDURE — 97112 NEUROMUSCULAR REEDUCATION: CPT | Performed by: PHYSICAL THERAPIST

## 2020-10-05 PROCEDURE — 97140 MANUAL THERAPY 1/> REGIONS: CPT | Performed by: PHYSICAL THERAPIST

## 2020-10-05 PROCEDURE — 97110 THERAPEUTIC EXERCISES: CPT | Performed by: PHYSICAL THERAPIST

## 2020-10-06 ENCOUNTER — OFFICE VISIT (OUTPATIENT)
Dept: FAMILY MEDICINE CLINIC | Facility: CLINIC | Age: 65
End: 2020-10-06
Payer: COMMERCIAL

## 2020-10-06 VITALS
BODY MASS INDEX: 24.83 KG/M2 | DIASTOLIC BLOOD PRESSURE: 64 MMHG | TEMPERATURE: 97.3 F | HEIGHT: 65 IN | OXYGEN SATURATION: 98 % | WEIGHT: 149 LBS | RESPIRATION RATE: 18 BRPM | SYSTOLIC BLOOD PRESSURE: 104 MMHG | HEART RATE: 88 BPM

## 2020-10-06 DIAGNOSIS — Z12.31 ENCOUNTER FOR SCREENING MAMMOGRAM FOR HIGH-RISK PATIENT: ICD-10-CM

## 2020-10-06 DIAGNOSIS — Z00.00 ENCOUNTER FOR SUBSEQUENT ANNUAL WELLNESS VISIT IN MEDICARE PATIENT: Primary | ICD-10-CM

## 2020-10-06 DIAGNOSIS — Z12.31 BREAST CANCER SCREENING BY MAMMOGRAM: ICD-10-CM

## 2020-10-06 DIAGNOSIS — M53.84 THORACIC SPINE DYSFUNCTION: ICD-10-CM

## 2020-10-06 DIAGNOSIS — R39.15 URINARY URGENCY: ICD-10-CM

## 2020-10-06 DIAGNOSIS — Z23 ENCOUNTER FOR IMMUNIZATION: ICD-10-CM

## 2020-10-06 PROCEDURE — G0008 ADMIN INFLUENZA VIRUS VAC: HCPCS

## 2020-10-06 PROCEDURE — G0439 PPPS, SUBSEQ VISIT: HCPCS | Performed by: FAMILY MEDICINE

## 2020-10-06 PROCEDURE — 99213 OFFICE O/P EST LOW 20 MIN: CPT | Performed by: FAMILY MEDICINE

## 2020-10-06 PROCEDURE — 90662 IIV NO PRSV INCREASED AG IM: CPT

## 2020-10-08 ENCOUNTER — EVALUATION (OUTPATIENT)
Dept: PHYSICAL THERAPY | Facility: CLINIC | Age: 65
End: 2020-10-08
Payer: COMMERCIAL

## 2020-10-08 DIAGNOSIS — M89.9 SCAPULAR DYSFUNCTION: ICD-10-CM

## 2020-10-08 DIAGNOSIS — M67.912 ROTATOR CUFF DYSFUNCTION, LEFT: Primary | ICD-10-CM

## 2020-10-08 PROCEDURE — 97112 NEUROMUSCULAR REEDUCATION: CPT | Performed by: PHYSICAL THERAPIST

## 2020-10-08 PROCEDURE — 97140 MANUAL THERAPY 1/> REGIONS: CPT | Performed by: PHYSICAL THERAPIST

## 2020-10-08 PROCEDURE — 97110 THERAPEUTIC EXERCISES: CPT | Performed by: PHYSICAL THERAPIST

## 2020-10-13 ENCOUNTER — APPOINTMENT (OUTPATIENT)
Dept: PHYSICAL THERAPY | Facility: CLINIC | Age: 65
End: 2020-10-13
Payer: COMMERCIAL

## 2020-10-15 ENCOUNTER — OFFICE VISIT (OUTPATIENT)
Dept: PHYSICAL THERAPY | Facility: CLINIC | Age: 65
End: 2020-10-15
Payer: COMMERCIAL

## 2020-10-15 DIAGNOSIS — M67.912 ROTATOR CUFF DYSFUNCTION, LEFT: Primary | ICD-10-CM

## 2020-10-15 DIAGNOSIS — M89.9 SCAPULAR DYSFUNCTION: ICD-10-CM

## 2020-10-15 PROCEDURE — 97140 MANUAL THERAPY 1/> REGIONS: CPT | Performed by: PHYSICAL THERAPIST

## 2020-10-15 PROCEDURE — 97112 NEUROMUSCULAR REEDUCATION: CPT | Performed by: PHYSICAL THERAPIST

## 2020-10-15 PROCEDURE — 97110 THERAPEUTIC EXERCISES: CPT | Performed by: PHYSICAL THERAPIST

## 2020-10-16 ENCOUNTER — APPOINTMENT (OUTPATIENT)
Dept: PHYSICAL THERAPY | Facility: CLINIC | Age: 65
End: 2020-10-16
Payer: COMMERCIAL

## 2020-10-19 ENCOUNTER — APPOINTMENT (OUTPATIENT)
Dept: PHYSICAL THERAPY | Facility: CLINIC | Age: 65
End: 2020-10-19
Payer: COMMERCIAL

## 2020-10-22 ENCOUNTER — OFFICE VISIT (OUTPATIENT)
Dept: PHYSICAL THERAPY | Facility: CLINIC | Age: 65
End: 2020-10-22
Payer: COMMERCIAL

## 2020-10-22 DIAGNOSIS — M67.912 ROTATOR CUFF DYSFUNCTION, LEFT: Primary | ICD-10-CM

## 2020-10-22 DIAGNOSIS — M89.9 SCAPULAR DYSFUNCTION: ICD-10-CM

## 2020-10-22 PROCEDURE — 97110 THERAPEUTIC EXERCISES: CPT | Performed by: PHYSICAL THERAPIST

## 2020-10-22 PROCEDURE — 97140 MANUAL THERAPY 1/> REGIONS: CPT | Performed by: PHYSICAL THERAPIST

## 2020-12-26 ENCOUNTER — NURSE TRIAGE (OUTPATIENT)
Dept: OTHER | Facility: OTHER | Age: 65
End: 2020-12-26

## 2020-12-30 ENCOUNTER — TELEPHONE (OUTPATIENT)
Dept: FAMILY MEDICINE CLINIC | Facility: CLINIC | Age: 65
End: 2020-12-30

## 2021-01-04 ENCOUNTER — OFFICE VISIT (OUTPATIENT)
Dept: FAMILY MEDICINE CLINIC | Facility: CLINIC | Age: 66
End: 2021-01-04
Payer: COMMERCIAL

## 2021-01-04 VITALS
BODY MASS INDEX: 25.16 KG/M2 | HEIGHT: 65 IN | DIASTOLIC BLOOD PRESSURE: 74 MMHG | HEART RATE: 72 BPM | RESPIRATION RATE: 14 BRPM | WEIGHT: 151 LBS | SYSTOLIC BLOOD PRESSURE: 120 MMHG | TEMPERATURE: 97.5 F

## 2021-01-04 DIAGNOSIS — H61.23 BILATERAL IMPACTED CERUMEN: Primary | ICD-10-CM

## 2021-01-04 PROCEDURE — 99212 OFFICE O/P EST SF 10 MIN: CPT | Performed by: FAMILY MEDICINE

## 2021-01-04 PROCEDURE — 69210 REMOVE IMPACTED EAR WAX UNI: CPT | Performed by: FAMILY MEDICINE

## 2021-01-04 NOTE — PROGRESS NOTES
FAMILY MEDICINE PROGRESS NOTE    Date of Service: 21  Primary Care Provider:   Bronwyn Denver, MD     Name: Valentina Hooks       : 1955       Age:65 y o  Sex: female      MRN: [id number]      Chief Complaint:Ear Fullness (R ear clogged)     ASSESSMENT and PLAN:  Valentina Hooks is a 76 y o  female with:     Problem List Items Addressed This Visit     None      Visit Diagnoses     Bilateral impacted cerumen    -  Primary        Ear lavage done in the clinic today  Tolerated well  No immediate complications  Some irritated skin in the canal  Discussed use of diluted vinegar in water for irritated  Recommended use of drops such as debrox if she feels wax accumulating  SUBJECTIVE:  Valentina Hooks is a 76 y o  female who presents today with a chief complaint of Ear Fullness (R ear clogged)  HPI     She feels like her ear is clogged, itchy  She has had to have lavage done in the past      She is asking about Shingles vaccine  Review of Systems   HENT: Positive for hearing loss  I have reviewed the patient's Past Medical History      Current Outpatient Medications:     BIOTIN 5000 PO, Take 1 tablet by mouth daily, Disp: , Rfl:     Cholecalciferol (Vitamin D3) 50 MCG (2000 UT) capsule, Take 2,000 Units by mouth daily, Disp: , Rfl:     Cyanocobalamin (BL VITAMIN B-12 PO), Take 1 tablet by mouth daily, Disp: , Rfl:     ferrous sulfate 325 (65 Fe) mg tablet, Take 1 tablet by mouth every other day , Disp: , Rfl:     Omega-3 1000 MG CAPS, Take 1,290 mg by mouth daily, Disp: , Rfl:     OBJECTIVE:  /74   Pulse 72   Temp 97 5 °F (36 4 °C)   Resp 14   Ht 5' 5 25" (1 657 m)   Wt 68 5 kg (151 lb)   BMI 24 94 kg/m²    BP Readings from Last 3 Encounters:   21 120/74   10/06/20 104/64   20 132/80      Wt Readings from Last 3 Encounters:   21 68 5 kg (151 lb)   10/06/20 67 6 kg (149 lb)   20 66 9 kg (147 lb 8 oz)      Physical Exam  Constitutional:       General: She is not in acute distress  Appearance: Normal appearance  She is not ill-appearing or toxic-appearing  HENT:      Right Ear: There is impacted cerumen  Left Ear: There is impacted cerumen  Ears:      Comments: Mildly irritated/macerated skin after cerumen removed  Neurological:      Mental Status: She is alert  Ear cerumen removal    Date/Time: 1/4/2021 4:08 PM  Performed by: Jacklyn Saavedra MD  Authorized by: Jacklyn Saavedra MD   Universal Protocol:  Consent: Verbal consent obtained  Consent given by: patient  Time out: Immediately prior to procedure a "time out" was called to verify the correct patient, procedure, equipment, support staff and site/side marked as required  Patient understanding: patient states understanding of the procedure being performed  Patient consent: the patient's understanding of the procedure matches consent given  Procedure consent: procedure consent matches procedure scheduled  Relevant documents: relevant documents not present or verified  Test results: test results not available  Site marked: the operative site was not marked  Radiology Images displayed and confirmed  If images not available, report reviewed: imaging studies not available  Required items: required blood products, implants, devices, and special equipment available  Patient identity confirmed: verbally with patient      Patient location:  Clinic  Procedure details:     Local anesthetic:  None    Location:  L ear and R ear    Procedure type: irrigation with instrumentation      Instrumentation: curette      Approach:  External  Post-procedure details:     Complication:  None    Hearing quality:  Improved    Patient tolerance of procedure: Tolerated well, no immediate complications        Return if symptoms worsen or fail to improve  Jacklyn Saavedra MD    Note: Portions of the record have been created with voice recognition software    Occasional wrong word or "sound a like" substitutions may have occurred due to the inherent limitations of voice recognition software  Read the chart carefully and recognize, using context, where substitutions have occurred

## 2021-02-10 ENCOUNTER — OFFICE VISIT (OUTPATIENT)
Dept: FAMILY MEDICINE CLINIC | Facility: CLINIC | Age: 66
End: 2021-02-10
Payer: COMMERCIAL

## 2021-02-10 VITALS
WEIGHT: 150 LBS | RESPIRATION RATE: 16 BRPM | HEART RATE: 78 BPM | TEMPERATURE: 98.2 F | BODY MASS INDEX: 24.77 KG/M2 | SYSTOLIC BLOOD PRESSURE: 122 MMHG | DIASTOLIC BLOOD PRESSURE: 70 MMHG

## 2021-02-10 DIAGNOSIS — M75.42 IMPINGEMENT SYNDROME OF LEFT SHOULDER: Primary | ICD-10-CM

## 2021-02-10 PROCEDURE — 99213 OFFICE O/P EST LOW 20 MIN: CPT | Performed by: FAMILY MEDICINE

## 2021-02-10 NOTE — PROGRESS NOTES
Assessment/Plan:     Diagnoses and all orders for this visit:    Impingement syndrome of left shoulder  -     Ambulatory referral to Physical Therapy; Future          Continue with current medications  Referral for physical therapy  Recheck as needed  Consider steroid injection for persistent symptoms     Patient ID: Valentina Hooks is a 76 y o  female  Patient presents with a several week history of recurrent left shoulder pain  No specific injury or trauma  Right handed  Pain worse with certain movements and at nighttime  She has been using as-needed Advil  The following portions of the patient's history were reviewed and updated as appropriate: allergies, current medications, past family history, past medical history, past social history, past surgical history and problem list     Review of Systems   Constitutional: Negative for appetite change, chills, fever and unexpected weight change  Respiratory: Negative for shortness of breath  Cardiovascular: Negative for chest pain and palpitations  Musculoskeletal: Positive for arthralgias  Negative for joint swelling and neck pain  Skin: Negative for rash  Neurological: Negative for weakness, numbness and headaches  Psychiatric/Behavioral: Positive for sleep disturbance  Objective:      /70   Pulse 78   Temp 98 2 °F (36 8 °C)   Resp 16   Wt 68 kg (150 lb)   BMI 24 77 kg/m²          Physical Exam  Constitutional:       General: She is not in acute distress  Musculoskeletal:         General: Tenderness present  Comments: Inspection left shoulder normal  No effusion  No warmth or redness  + left subacromial tenderness  Decreased ROM with internal rotation  Negative cross arm test  Negative Speed test  Negative Yergason sign  + impingement sign  Negative empty can sign  Negative sulcus sign  Negative Bee   Lymphadenopathy:      Cervical: No cervical adenopathy  Neurological:      Mental Status: She is alert  Sensory: No sensory deficit  Motor: No weakness        Deep Tendon Reflexes: Reflexes normal

## 2021-02-12 ENCOUNTER — EVALUATION (OUTPATIENT)
Dept: PHYSICAL THERAPY | Facility: CLINIC | Age: 66
End: 2021-02-12
Payer: COMMERCIAL

## 2021-02-12 DIAGNOSIS — M75.42 IMPINGEMENT SYNDROME OF LEFT SHOULDER: Primary | ICD-10-CM

## 2021-02-12 PROCEDURE — 97161 PT EVAL LOW COMPLEX 20 MIN: CPT | Performed by: PHYSICAL THERAPIST

## 2021-02-12 PROCEDURE — 97110 THERAPEUTIC EXERCISES: CPT | Performed by: PHYSICAL THERAPIST

## 2021-02-12 NOTE — PROGRESS NOTES
PT EVALUATION    Today's date: 21  Patient name: Dylon Rendon  : 1955  MRN: [id number]  Referring provider: Gisela Fernando MD  Dx:   1  Impingement syndrome of left shoulder        Dylon Rendon is a 76 y o  female who presents with recent onset anterior left shoulder pain  Symptoms are consistent with impingement, biceps tendinopathy and proximal biceps instability  This patient would benefit from skilled physical therapy to address their listed impairments and functional limitations to maximize functional outcome  Impairments:    restricted ROM    decreased strength   pain with function   activity intolerance   poor posture     Prognosis:  Fair  Positive and negative prognostic indicator(s):  prior success with conservative care    Goals:    STG Patient is independent with HEP   STG Range of motion is improved by 25% in 2 weeks  LTG Range of motion is improved by 50% in 4 weeks  LTG No limitations with bed mobility in 4 weeks    Planned interventions:  home exercise program, manual therapy, graded activity, flexibility, functional range of motion exercises and strengthening    Duration in visits:  6  Frequency: 2-3 visits per week  Duration in weeks:  2-3    History of Current Injury: patient reports rolling onto her left shoulder a few weeks ago and felt the head of the humerus moved in the socket  It was not painful at the time but lately she has not been able to lie on the left side  She notes more crepitus in the left shoulder with movement  She had a little soreness the day after shoveling last week but not during      Pain location: around the shoulder, sometimes down the upper arm to the elbow, occasionally down to the wrist  Pain descriptors:  Aching, burning, sometimes sharp  Pain intensity:  Up to 10/10    Aggravating factors: reaching across the body, reaching backwards, sleeping on either side  Easing factors: avoiding above activities    Imaging: none  Patient goals: decreased pain, independence with ADLs, increased mobility and increased strength    Objective     Tenderness     Left Shoulder   Tenderness in the biceps tendon (proximal), bicipital groove and coracoid process  [facility], infraspinatus tendon, medial scapula and supraspinatus tendon  Active Range of Motion   Left Shoulder   Flexion: 120 degrees with pain  Extension: 28 degrees with pain  Abduction: 108 degrees with pain  Internal rotation BTB: sacrum with pain    Right Shoulder   Flexion: 170 degrees   Extension: 52 degrees   Abduction: 170 degrees   Internal rotation BTB: T6     Additional Active Range of Motion Details  Repeated crepitus at the anterior shoulder with with ER past neutral    Strength/Myotome Testing     Left Shoulder     Planes of Motion   Flexion: 3-   Extension: 3   Abduction: 3-   External rotation at 0°: 4   Internal rotation at 0°: 4+     Tests     Left Shoulder   Negative apprehension and belly press       Additional Tests Details  Some testing held as patient was unable to tolerate testing position    General Comments:      Shoulder Comments   Posture: depressed left upper quarter compared to right          Precautions: none      Manuals 2/12            PROM IR                                                    Neuro Re-Ed                                                                                                        Ther Ex             Row iso :10x10            shld ext iso :10x5            ER iso :10x5            Biceps curls iso :10x5            Stool seated roll outs 10            Thoracic ext arms crossed 10            IR iso             Cane AAROM scaption             Prone IL ->T                                                                 Ther Activity                                       Gait Training                                       Modalities

## 2021-02-13 DIAGNOSIS — Z23 ENCOUNTER FOR IMMUNIZATION: ICD-10-CM

## 2021-02-15 ENCOUNTER — OFFICE VISIT (OUTPATIENT)
Dept: PHYSICAL THERAPY | Facility: CLINIC | Age: 66
End: 2021-02-15
Payer: COMMERCIAL

## 2021-02-15 ENCOUNTER — IMMUNIZATIONS (OUTPATIENT)
Dept: FAMILY MEDICINE CLINIC | Facility: HOSPITAL | Age: 66
End: 2021-02-15

## 2021-02-15 DIAGNOSIS — Z23 ENCOUNTER FOR IMMUNIZATION: Primary | ICD-10-CM

## 2021-02-15 DIAGNOSIS — M75.42 IMPINGEMENT SYNDROME OF LEFT SHOULDER: Primary | ICD-10-CM

## 2021-02-15 PROCEDURE — 97110 THERAPEUTIC EXERCISES: CPT | Performed by: PHYSICAL THERAPIST

## 2021-02-15 PROCEDURE — 91300 SARS-COV-2 / COVID-19 MRNA VACCINE (PFIZER-BIONTECH) 30 MCG: CPT

## 2021-02-15 PROCEDURE — 0001A SARS-COV-2 / COVID-19 MRNA VACCINE (PFIZER-BIONTECH) 30 MCG: CPT

## 2021-02-15 PROCEDURE — 97140 MANUAL THERAPY 1/> REGIONS: CPT | Performed by: PHYSICAL THERAPIST

## 2021-02-15 PROCEDURE — 97112 NEUROMUSCULAR REEDUCATION: CPT | Performed by: PHYSICAL THERAPIST

## 2021-02-15 NOTE — PROGRESS NOTES
Daily Note     Today's date: 2/15/2021  Patient name: Bridget Villegas  : 1955  MRN: [id number]  Referring provider: Benjamín Caro MD  Dx:   Encounter Diagnosis     ICD-10-CM    1  Impingement syndrome of left shoulder  M75 42               Subjective: patient reports temporary soreness after last treatment  She has been able to find a better sleeping position keeping the left arm propped up on two pillows      Objective: See treatment diary below      Assessment: Tolerated treatment well  Patient exhibited good technique with therapeutic exercises and would benefit from continued PT  Continued ROM activities, working in a pain free range  More tightness felt than soreness during exercises  Plan: Progress treatment as tolerated         Precautions: none      Manuals 2/12 2/15           PROM IR  SY           Left scap ROM  SY                                     Neuro Re-Ed                                                                                                        Ther Ex             Row iso :10x10 :05x15           shld ext iso :10x5            ER iso :10x5 sidelying #1 :05x12           Biceps curls iso :10x5            Stool seated roll outs 10 standing pball 20           pball rolls  scaption 15           Thoracic ext arms crossed 10 15           IR iso             Cane AAROM scaption  15           Prone IL ->T                                                                 Ther Activity                                       Gait Training                                       Modalities

## 2021-02-18 ENCOUNTER — APPOINTMENT (OUTPATIENT)
Dept: PHYSICAL THERAPY | Facility: CLINIC | Age: 66
End: 2021-02-18
Payer: COMMERCIAL

## 2021-02-19 ENCOUNTER — OFFICE VISIT (OUTPATIENT)
Dept: PHYSICAL THERAPY | Facility: CLINIC | Age: 66
End: 2021-02-19
Payer: COMMERCIAL

## 2021-02-19 DIAGNOSIS — M75.42 IMPINGEMENT SYNDROME OF LEFT SHOULDER: Primary | ICD-10-CM

## 2021-02-19 PROCEDURE — 97140 MANUAL THERAPY 1/> REGIONS: CPT | Performed by: PHYSICAL THERAPIST

## 2021-02-19 PROCEDURE — 97112 NEUROMUSCULAR REEDUCATION: CPT | Performed by: PHYSICAL THERAPIST

## 2021-02-19 PROCEDURE — 97110 THERAPEUTIC EXERCISES: CPT | Performed by: PHYSICAL THERAPIST

## 2021-02-19 NOTE — PROGRESS NOTES
Daily Note     Today's date: 2021  Patient name: Robby Duncan  : 1955  MRN: [id number]  Referring provider: Everardo Kruger MD  Dx:   Encounter Diagnosis     ICD-10-CM    1  Impingement syndrome of left shoulder  M75 42                   Subjective: patient reports aching at the start of treatment  Aching was significantly less at the end of treatment  Objective: See treatment diary below      Assessment: Tolerated treatment well  Patient exhibited good technique with therapeutic exercises and would benefit from continued PT  Continued modification of TE with focus on improving ROM while avoid repeated crepitus  Plan: Progress treatment as tolerated         Precautions: none      Manuals 2/12 2/15 2/19          PROM IR  SY SY          Left scap ROM  SY SY with UT/supra STM                                    Neuro Re-Ed                                                                                                        Ther Ex             Row iso :10x10 :05x15 Red TB 20          shld ext iso :10x5  Red TB 20          ER iso :10x5 sidelying #1 :05x12 :10x10 no weight          Biceps curls iso :10x5            Stool seated roll outs 10 standing pball 20 seated 20          pball rolls  scaption 15 scaption 20          Thoracic ext arms crossed 10 15 :05x15          IR iso             Cane AAROM scaption  15 20          Prone IL ->T   :05x15 ILT                                                              Ther Activity                                       Gait Training                                       Modalities

## 2021-02-22 ENCOUNTER — OFFICE VISIT (OUTPATIENT)
Dept: PHYSICAL THERAPY | Facility: CLINIC | Age: 66
End: 2021-02-22
Payer: COMMERCIAL

## 2021-02-22 DIAGNOSIS — M75.42 IMPINGEMENT SYNDROME OF LEFT SHOULDER: Primary | ICD-10-CM

## 2021-02-22 PROCEDURE — 97140 MANUAL THERAPY 1/> REGIONS: CPT | Performed by: PHYSICAL THERAPIST

## 2021-02-22 PROCEDURE — 97110 THERAPEUTIC EXERCISES: CPT | Performed by: PHYSICAL THERAPIST

## 2021-02-22 PROCEDURE — 97112 NEUROMUSCULAR REEDUCATION: CPT | Performed by: PHYSICAL THERAPIST

## 2021-02-22 NOTE — PROGRESS NOTES
Daily Note     Today's date: 2021  Patient name: Colton Mascorro  : 1955  MRN: [id number]  Referring provider: Lizzeth Esparza MD  Dx:   Encounter Diagnosis     ICD-10-CM    1  Impingement syndrome of left shoulder  M75 42                 Subjective: patient reports continued stiffness in the shoulder and upper arm  She has been consistent with home exercises  Objective: See treatment diary below      Assessment: Tolerated treatment well  Patient exhibited good technique with therapeutic exercises and would benefit from continued PT  IR PROM progressing well with only mild restriction today      Plan: Progress treatment as tolerated         Precautions: none      Manuals 2/12 2/15 2/19 2/22         PROM IR  SY SY SY         Left scap ROM  SY SY with UT/supra STM SY                                   Neuro Re-Ed                                                                                                        Ther Ex             Row iso :10x10 :05x15 Red TB 20 Red TB 20         shld ext iso :10x5  Red TB 20 Red TB 20         ER iso sidelying :10x5 sidelying #1 :05x12 :10x10 no weight #1 :10x10         Band ER    nv         Biceps curls iso :10x5            Stool seated roll outs 10 standing pball 20 seated 20 standing pball :03x20         pball rolls  scaption 15 scaption 20 scaption 20         Thoracic ext arms crossed 10 15 :05x15 :05x15         IR iso             Cane AAROM scaption  15 20 20         Prone IL ->T   :05x15 ILT :05x15 all 3                                                             Ther Activity                                       Gait Training                                       Modalities

## 2021-02-25 ENCOUNTER — OFFICE VISIT (OUTPATIENT)
Dept: PHYSICAL THERAPY | Facility: CLINIC | Age: 66
End: 2021-02-25
Payer: COMMERCIAL

## 2021-02-25 DIAGNOSIS — M75.42 IMPINGEMENT SYNDROME OF LEFT SHOULDER: Primary | ICD-10-CM

## 2021-02-25 PROCEDURE — 97112 NEUROMUSCULAR REEDUCATION: CPT | Performed by: PHYSICAL THERAPIST

## 2021-02-25 PROCEDURE — 97140 MANUAL THERAPY 1/> REGIONS: CPT | Performed by: PHYSICAL THERAPIST

## 2021-02-25 PROCEDURE — 97110 THERAPEUTIC EXERCISES: CPT | Performed by: PHYSICAL THERAPIST

## 2021-02-25 NOTE — PROGRESS NOTES
Daily Note     Today's date: 2021  Patient name: Valentina Hooks  : 1955  MRN: [id number]  Referring provider: Hernan Olsen MD  Dx:   Encounter Diagnosis     ICD-10-CM    1  Impingement syndrome of left shoulder  M75 42                 Subjective: patient reports she is now able to perform thoracic extension stretch with her hands behind her head      Objective: See treatment diary below      Assessment: Tolerated treatment well  Patient exhibited good technique with therapeutic exercises and would benefit from continued PT  Progressed AAROM activities  No soreness reported after treatment but there was transient left arm symptoms towards the end of rows  Plan: Progress treatment as tolerated         Precautions: none      Manuals 2/12 2/15 2/19 2/22 2/25        PROM IR  SY SY SY SY        Left scap ROM  SY SY with UT/supra STM SY SY                                  Neuro Re-Ed                                                                                                        Ther Ex             Row iso :10x10 :05x15 Red TB 20 Red 25 Green 25        shld ext iso :10x5  Red TB 20 Red TB 20 Red 25        ER iso sidelying :10x5 sidelying #1 :05x12 :10x10 no weight #1 :10x10 #1 :05x15        Band ER    nv Red ER        Biceps curls iso :10x5            Stool seated roll outs 10 standing pball 20 seated 20 standing pball :03x20 25        pball rolls  scaption 15 scaption 20 scaption 20 25        Thoracic ext arms crossed 10 15 :05x15 :05x15 20        IR iso             Cane AAROM scaption  15 20 20 20        Prone IL ->T   :05x15 ILT :05x15 all 3 #1 20 I, no weight x20 LT                                                            Ther Activity                                       Gait Training                                       Modalities

## 2021-03-01 ENCOUNTER — OFFICE VISIT (OUTPATIENT)
Dept: PHYSICAL THERAPY | Facility: CLINIC | Age: 66
End: 2021-03-01
Payer: COMMERCIAL

## 2021-03-01 DIAGNOSIS — M75.42 IMPINGEMENT SYNDROME OF LEFT SHOULDER: Primary | ICD-10-CM

## 2021-03-01 PROCEDURE — 97110 THERAPEUTIC EXERCISES: CPT | Performed by: PHYSICAL THERAPIST

## 2021-03-01 PROCEDURE — 97112 NEUROMUSCULAR REEDUCATION: CPT | Performed by: PHYSICAL THERAPIST

## 2021-03-01 PROCEDURE — 97140 MANUAL THERAPY 1/> REGIONS: CPT | Performed by: PHYSICAL THERAPIST

## 2021-03-01 NOTE — PROGRESS NOTES
Daily Note     Today's date: 3/1/2021  Patient name: Landen Sanderson  : 1955  MRN: [id number]  Referring provider: Alexandru Salmeron MD  Dx:   Encounter Diagnosis     ICD-10-CM    1  Impingement syndrome of left shoulder  M75 42                 Subjective: patient reports her shoulder was sore the day after last treatment  She walks a lot and sometimes feels she has to support her left arm as it is hanging      Objective: See treatment diary below      Assessment: Tolerated treatment well  Patient exhibited good technique with therapeutic exercises and would benefit from continued PT  Continued isometrics with low reps and reduced use of the #1 weight due to soreness after last treatment  Plan: Progress treatment as tolerated         Precautions: none      Manuals 2/12 2/15 2/19 2/22 2/25 3/1       PROM IR  SY SY SY SY SY       Left scap ROM  SY SY with UT/supra STM SY SY SY                                 Neuro Re-Ed                                                                                                        Ther Ex             Row  :10x10 :05x15 Red TB 20 Red 25 Green 25 Red 20       shld ext  :10x5  Red TB 20 Red TB 20 Red 25 Red 20       ER iso sidelying :10x5 sidelying #1 :05x12 :10x10 no weight #1 :10x10 #1 :05x15 #1 :10x10       Band ER    nv Red ER Red 20       Biceps curls iso :10x5            Stool seated roll outs 10 standing pball 20 seated 20 standing pball :03x20 25 25       pball rolls  scaption 15 scaption 20 scaption 20 25        Thoracic ext arms crossed 10 15 :05x15 :05x15 20 20       IR iso             Cane AAROM scaption  15 20 20 20 20       Prone IL ->T   :05x15 ILT :05x15 all 3 #1 20 I, no weight x20 LT :10x10 I, :10x5 T and L                                                            Ther Activity                                       Gait Training                                       Modalities

## 2021-03-04 ENCOUNTER — OFFICE VISIT (OUTPATIENT)
Dept: PHYSICAL THERAPY | Facility: CLINIC | Age: 66
End: 2021-03-04
Payer: COMMERCIAL

## 2021-03-04 DIAGNOSIS — M75.42 IMPINGEMENT SYNDROME OF LEFT SHOULDER: Primary | ICD-10-CM

## 2021-03-04 PROCEDURE — 97110 THERAPEUTIC EXERCISES: CPT | Performed by: PHYSICAL THERAPIST

## 2021-03-04 PROCEDURE — 97140 MANUAL THERAPY 1/> REGIONS: CPT | Performed by: PHYSICAL THERAPIST

## 2021-03-04 NOTE — PROGRESS NOTES
Daily Note     Today's date: 3/4/2021  Patient name: Tracey Giordano  : 1955  MRN: [id number]  Referring provider: Paulo Chanel MD  Dx:   Encounter Diagnosis     ICD-10-CM    1  Impingement syndrome of left shoulder  M75 42               Subjective: patient reports increased left shoulder pain with symptoms both posterior and anterior  She also notes some right shoulder pain      Objective: See treatment diary below      Assessment: Tolerated treatment fair  Patient exhibited good technique with therapeutic exercises and would benefit from continued PT  Increased tightness with IR and flexion noting a capsular end feel on both  Plan: Progress treatment as tolerated         Precautions: none      Manuals 2/12 2/15 2/19 2/22 2/25 3/1 3/4      PROM IR  SY SY SY SY SY SY      Left scap ROM  SY SY with UT/supra STM SY SY SY SY                                Neuro Re-Ed                                                                                                        Ther Ex             Row  :10x10 :05x15 Red TB 20 Red 25 Green 25 Red 20       shld ext  :10x5  Red TB 20 Red TB 20 Red 25 Red 20       ER iso sidelying :10x5 sidelying #1 :05x12 :10x10 no weight #1 :10x10 #1 :05x15 #1 :10x10       Band ER    nv Red ER Red 20       Biceps curls iso :10x5            Stool seated roll outs 10 standing pball 20 seated 20 standing pball :03x20 25 25 Chair, table arm slides :10x10      pball rolls  scaption 15 scaption 20 scaption 20 25        Thoracic ext arms crossed 10 15 :05x15 :05x15 20 20 20      IR iso             Cane AAROM scaption  15 20 20 20 20       Prone IL ->T   :05x15 ILT :05x15 all 3 #1 20 I, no weight x20 LT :10x10 I, :10x5 T and L        Table slides scaption       :10x5      Cross body stretch       :10x10, arm below shoulder aman      Table slides       :10x5      Sleeper stretch IR       :10x5                                                          Ther Activity Gait Training                                       Modalities

## 2021-03-08 ENCOUNTER — IMMUNIZATIONS (OUTPATIENT)
Dept: FAMILY MEDICINE CLINIC | Facility: HOSPITAL | Age: 66
End: 2021-03-08

## 2021-03-08 ENCOUNTER — OFFICE VISIT (OUTPATIENT)
Dept: PHYSICAL THERAPY | Facility: CLINIC | Age: 66
End: 2021-03-08
Payer: COMMERCIAL

## 2021-03-08 DIAGNOSIS — Z23 ENCOUNTER FOR IMMUNIZATION: Primary | ICD-10-CM

## 2021-03-08 DIAGNOSIS — M75.42 IMPINGEMENT SYNDROME OF LEFT SHOULDER: Primary | ICD-10-CM

## 2021-03-08 PROCEDURE — 97140 MANUAL THERAPY 1/> REGIONS: CPT | Performed by: PHYSICAL THERAPIST

## 2021-03-08 PROCEDURE — 0002A SARS-COV-2 / COVID-19 MRNA VACCINE (PFIZER-BIONTECH) 30 MCG: CPT

## 2021-03-08 PROCEDURE — 97530 THERAPEUTIC ACTIVITIES: CPT | Performed by: PHYSICAL THERAPIST

## 2021-03-08 PROCEDURE — 97110 THERAPEUTIC EXERCISES: CPT | Performed by: PHYSICAL THERAPIST

## 2021-03-08 PROCEDURE — 91300 SARS-COV-2 / COVID-19 MRNA VACCINE (PFIZER-BIONTECH) 30 MCG: CPT

## 2021-03-08 NOTE — PROGRESS NOTES
Daily Note     Today's date: 3/8/2021  Patient name: Julian Law  : 1955  MRN: [id number]  Referring provider: Hong Avina MD  Dx:   Encounter Diagnosis     ICD-10-CM    1  Impingement syndrome of left shoulder  M75 42      Start Time: 730  Stop Time: 815  Total time in clinic (min): 45 minutes  Subjective: Patient reports, "The shoulder is feeling better since starting the stretches  I do have a question on the one I don't think I'm doing right "       Objective: See treatment diary below      Assessment: Patient tolerated treatment well  Continued with established POC of the primary therapist  Patient had mild complaints of pain with stretches and manual stretching  Patient most limited into IR  Patient exhibited good technique with therapeutic exercises and would benefit from continued PT  Plan: Progress treatment as tolerated          Precautions: none      Manuals 2/12 2/15 2/19 2/22 2/25 3/1 3/4 3/8     PROM IR  SY SY SY SY SY SY ACL     Left scap ROM  SY SY with UT/supra STM SY SY SY SY                                Neuro Re-Ed                                                                                                        Ther Ex             Row  :10x10 :05x15 Red TB 20 Red 25 Green 25 Red 20       shld ext  :10x5  Red TB 20 Red TB 20 Red 25 Red 20       ER iso sidelying :10x5 sidelying #1 :05x12 :10x10 no weight #1 :10x10 #1 :05x15 #1 :10x10       Band ER    nv Red ER Red 20       Biceps curls iso :10x5            Stool seated roll outs 10 standing pball 20 seated 20 standing pball :03x20 25 25 Chair, table arm slides :10x10 Chair, table arm slides :10x10     pball rolls  scaption 15 scaption 20 scaption 20 25        Thoracic ext arms crossed 10 15 :05x15 :05x15 20 20 20 20     IR iso             Cane AAROM scaption  15 20 20 20 20       Prone IL ->T   :05x15 ILT :05x15 all 3 #1 20 I, no weight x20 LT :10x10 I, :10x5 T and L        Table slides scaption       :10x5 :10x5 Cross body stretch       :10x10, arm below shoulder aman :10x10, arm below shoulder aman     Table slides       :10x5 :10x5     Sleeper stretch IR       :10x5 :10x5                                                         Ther Activity                                       Gait Training                                       Modalities

## 2021-03-11 ENCOUNTER — OFFICE VISIT (OUTPATIENT)
Dept: PHYSICAL THERAPY | Facility: CLINIC | Age: 66
End: 2021-03-11
Payer: COMMERCIAL

## 2021-03-11 DIAGNOSIS — M75.42 IMPINGEMENT SYNDROME OF LEFT SHOULDER: Primary | ICD-10-CM

## 2021-03-11 PROCEDURE — 97110 THERAPEUTIC EXERCISES: CPT

## 2021-03-11 PROCEDURE — 97140 MANUAL THERAPY 1/> REGIONS: CPT

## 2021-03-11 NOTE — PROGRESS NOTES
Daily Note     Today's date: 3/11/2021  Patient name: Marguerite Eldridge  : 1955  MRN: [id number]  Referring provider: Francie Cooper MD  Dx:   Encounter Diagnosis     ICD-10-CM    1  Impingement syndrome of left shoulder  M75 42                   Subjective: Patient feels L shoulder mobility is improving as she is now able to shave and wash her under arm though she continues to have pain when reaching overhead and when pulling up her pants  Objective: See treatment diary below      Assessment: Tolerated treatment well  Patient exhibited good technique with therapeutic exercises and would benefit from continued PT  PROM IR continues to be most restricted though good tolerance to manual stretching  Reviewed proper form for sleeper stretch with moderate stretching reported in posterior shoulder  Plan: Progress treatment as tolerated         Precautions: none      Manuals 2/12 2/15 2/19 2/22 2/25 3/1 3/4 3/8 3/11    PROM IR  SY SY SY SY SY SY ACL MC    Left scap ROM  SY SY with UT/supra STM SY SY SY SY  MC                              Neuro Re-Ed                                                                                                        Ther Ex             Row  :10x10 :05x15 Red TB 20 Red 25 Green 25 Red 20       shld ext  :10x5  Red TB 20 Red TB 20 Red 25 Red 20       ER iso sidelying :10x5 sidelying #1 :05x12 :10x10 no weight #1 :10x10 #1 :05x15 #1 :10x10       Band ER    nv Red ER Red 20       Biceps curls iso :10x5            Stool seated roll outs 10 standing pball 20 seated 20 standing pball :03x20 25 25 Chair, table arm slides :10x10 Chair, table arm slides :10x10 Chair table arm slides :10x10    pball rolls  scaption 15 scaption 20 scaption 20 25        Thoracic ext arms crossed 10 15 :05x15 :05x15 20 20 20 20 20    IR iso             Cane AAROM scaption  15 20 20 20 20       Prone IL ->T   :05x15 ILT :05x15 all 3 #1 20 I, no weight x20 LT :10x10 I, :10x5 T and L        Table slides scaption       :10x5 :10x5 :10x5    Cross body stretch       :10x10, arm below shoulder aman :10x10, arm below shoulder aman :10x10 arm below shoulder height    Table slides ER       :10x5 :10x5 :10x5    Sleeper stretch IR       :10x5 :10x5 :10x5                                                        Ther Activity                                       Gait Training                                       Modalities

## 2021-03-15 ENCOUNTER — OFFICE VISIT (OUTPATIENT)
Dept: PHYSICAL THERAPY | Facility: CLINIC | Age: 66
End: 2021-03-15
Payer: COMMERCIAL

## 2021-03-15 DIAGNOSIS — M75.42 IMPINGEMENT SYNDROME OF LEFT SHOULDER: Primary | ICD-10-CM

## 2021-03-15 PROCEDURE — 97140 MANUAL THERAPY 1/> REGIONS: CPT

## 2021-03-15 PROCEDURE — 97110 THERAPEUTIC EXERCISES: CPT

## 2021-03-15 NOTE — PROGRESS NOTES
Daily Note     Today's date: 3/15/2021  Patient name: Yash Garcia  : 1955  MRN: [id number]  Referring provider: Rasheed Byrne MD  Dx:   Encounter Diagnosis     ICD-10-CM    1  Impingement syndrome of left shoulder  M75 42                   Subjective: Patient reports she had a bad weekend with L shoulder pain  She complains of anterior shoulder pain when walking with her arm swinging and she had difficulty sleeping in bed (not her own) and had to switch to a recliner  She also states she woke up with paresthesia in LUE 1x     She stopped performing scaption table slides as part of HEP and L shoulder feels better overall today  Objective: See treatment diary below      Assessment: Tolerated treatment well  PROM continues to progress with mild tightness into overhead motions, flex>abduction  Patient exhibited good technique with therapeutic exercises and would benefit from continued PT  Held scaption AAROM today as patient felt this exercise may be exacerbating pain  Plan: Progress treatment as tolerated         Precautions: none      Manuals 2/12 2/15 2/19 2/22 2/25 3/1 3/4 3/8 3/11 3/15   PROM IR  SY SY SY SY SY SY ACL MC MC   Left scap ROM  SY SY with UT/supra STM SY SY SY SY  MC MC                             Neuro Re-Ed                                                                                                        Ther Ex             Row  :10x10 :05x15 Red TB 20 Red 25 Green 25 Red 20       shld ext  :10x5  Red TB 20 Red TB 20 Red 25 Red 20       ER iso sidelying :10x5 sidelying #1 :05x12 :10x10 no weight #1 :10x10 #1 :05x15 #1 :10x10       Band ER    nv Red ER Red 20       Biceps curls iso :10x5            Stool seated roll outs 10 standing pball 20 seated 20 standing pball :03x20 25 25 Chair, table arm slides :10x10 Chair, table arm slides :10x10 Chair table arm slides :10x10 Chair table arm slides :1010   pball rolls  scaption 15 scaption 20 scaption 20 25        Thoracic ext arms crossed 10 15 :05x15 :05x15 20 20 20 20 20 20   IR iso             Cane AAROM scaption  15 20 20 20 20       Prone IL ->T   :05x15 ILT :05x15 all 3 #1 20 I, no weight x20 LT :10x10 I, :10x5 T and L        Table slides scaption       :10x5 :10x5 :10x5    Cross body stretch       :10x10, arm below shoulder aman :10x10, arm below shoulder aman :10x10 arm below shoulder height :10x10 arm below shoulder height   Table slides ER       :10x5 :10x5 :10x5 :10x5   Sleeper stretch IR       :10x5 :10x5 :10x5 :10x5                                                       Ther Activity                                       Gait Training                                       Modalities

## 2021-03-18 ENCOUNTER — OFFICE VISIT (OUTPATIENT)
Dept: PHYSICAL THERAPY | Facility: CLINIC | Age: 66
End: 2021-03-18
Payer: COMMERCIAL

## 2021-03-18 DIAGNOSIS — M75.42 IMPINGEMENT SYNDROME OF LEFT SHOULDER: Primary | ICD-10-CM

## 2021-03-18 PROCEDURE — 97140 MANUAL THERAPY 1/> REGIONS: CPT | Performed by: PHYSICAL THERAPIST

## 2021-03-18 PROCEDURE — 97110 THERAPEUTIC EXERCISES: CPT | Performed by: PHYSICAL THERAPIST

## 2021-03-18 NOTE — PROGRESS NOTES
Daily Note     Today's date: 3/18/2021  Patient name: Ryan Oscar  : 1955  MRN: [id number]  Referring provider: Blanca Rm MD  Dx:   Encounter Diagnosis     ICD-10-CM    1  Impingement syndrome of left shoulder  M75 42               Subjective: patient states she was sore for a while after last treatment but later that night she felt much better      Objective: See treatment diary below      Assessment: Tolerated treatment well  Patient exhibited good technique with therapeutic exercises and would benefit from continued PT   Mild tightness in flexion>IR>ER> abduction  Encouraged continue light stretching at home, no forceful ROM    Plan: Progress treatment as tolerated         Precautions: none      Manuals 3/19         3/15   PROM all SY            Left scap ROM SY                                      Neuro Re-Ed                                                                                                        Ther Ex             Row              shld ext              ER iso sidelying             Band ER             Biceps curls iso             Stool seated roll outs Chair table arm slides :1010         Chair table arm slides :1010   pball rolls             Thoracic ext arms crossed 20         20   IR iso             Cane AAROM scaption             Prone IL ->T             Table slides scaption             Cross body stretch :10x10 arm below shoulder height         :10x10 arm below shoulder height   Table slides ER :10x5         :10x5   Sleeper stretch IR :10x5         :10x5                                                       Ther Activity                                       Gait Training                                       Modalities

## 2021-03-22 ENCOUNTER — OFFICE VISIT (OUTPATIENT)
Dept: PHYSICAL THERAPY | Facility: CLINIC | Age: 66
End: 2021-03-22
Payer: COMMERCIAL

## 2021-03-22 DIAGNOSIS — M75.42 IMPINGEMENT SYNDROME OF LEFT SHOULDER: Primary | ICD-10-CM

## 2021-03-22 PROCEDURE — 97140 MANUAL THERAPY 1/> REGIONS: CPT

## 2021-03-22 PROCEDURE — 97110 THERAPEUTIC EXERCISES: CPT

## 2021-03-22 PROCEDURE — 97112 NEUROMUSCULAR REEDUCATION: CPT

## 2021-03-22 NOTE — PROGRESS NOTES
Daily Note     Today's date: 3/22/2021  Patient name: Rosibel Alicia  : 1955  MRN: [id number]  Referring provider: Ezzard Hammans, MD  Dx:   Encounter Diagnosis     ICD-10-CM    1  Impingement syndrome of left shoulder  M75 42                   Subjective: Patient feels L shoulder mobility is improving though she has been experiencing increased R anterior shoulder pain which is preventing her from sleeping on R side  Objective: See treatment diary below      Assessment: Tolerated treatment well  Patient exhibited good technique with therapeutic exercises and would benefit from continued PT  Assessed R shoulder mobility per primary PT SY which was full in all planes and pain free  Added light periscapular strengthening in neutral which was tolerated well  Advised patient to continue with stretching at home bilaterally  Plan: Progress treatment as tolerated         Precautions: none      Manuals 3/19 3/22        3/15   PROM all SY [facility]   Left scap ROM SY [facility]                             Neuro Re-Ed                                                                                                        Ther Ex             Row   ytb to neutral 20           shld ext   ytb to neutral 20           ER iso sidelying             Band ER             Biceps curls iso             Stool seated roll outs Chair table arm slides :10x10 Chair table arm slides :10x10        Chair table arm slides :1010   pball rolls             Thoracic ext arms crossed 20 20        20   IR iso             Cane AAROM scaption             Prone IL ->T             Table slides scaption             Cross body stretch :10x10 arm below shoulder height :10x10 arm below shoulder height        :10x10 arm below shoulder height   Table slides ER :10x5 :10x5        :10x5   Sleeper stretch IR :10x5 :10x5        :10x5                                                       Ther Activity                                       Gait Training                                       Modalities

## 2021-03-25 ENCOUNTER — OFFICE VISIT (OUTPATIENT)
Dept: PHYSICAL THERAPY | Facility: CLINIC | Age: 66
End: 2021-03-25
Payer: COMMERCIAL

## 2021-03-25 DIAGNOSIS — M75.42 IMPINGEMENT SYNDROME OF LEFT SHOULDER: Primary | ICD-10-CM

## 2021-03-25 PROCEDURE — 97140 MANUAL THERAPY 1/> REGIONS: CPT | Performed by: PHYSICAL THERAPIST

## 2021-03-25 PROCEDURE — 97110 THERAPEUTIC EXERCISES: CPT | Performed by: PHYSICAL THERAPIST

## 2021-03-25 NOTE — PROGRESS NOTES
Daily Note     Today's date: 3/25/2021  Patient name: Yash Garcia  : 1955  MRN: [id number]  Referring provider: Rasheed Byrne MD  Dx:   Encounter Diagnosis     ICD-10-CM    1  Impingement syndrome of left shoulder  M75 42                 Subjective: patient states both shoulders are feeling better; she has been performing ROM activities on each side      Objective: See treatment diary below      Assessment: Tolerated treatment well  Patient exhibited good technique with therapeutic exercises and would benefit from continued PT  Tightness with end range in the left shoulder with IR, flexion and slightly abduction      Plan: Progress treatment as tolerated         Precautions: none      Manuals 3/19 3/22 3/25       3/15   PROM all SY MC SY       MC   Left scap ROM SY MC SY       MC                             Neuro Re-Ed                                                                                                        Ther Ex             Row   ytb to neutral 20 GTB neutral 20          shld ext   ytb to neutral 20 GTB neutral 20          ER iso sidelying             Band ER             Biceps curls iso             Stool seated roll outs Chair table arm slides :10x10 Chair table arm slides :10x10 Chair table arm slides :10x10       Chair table arm slides :1010   pball rolls             Thoracic ext arms crossed 20 20 20       20   IR iso             Cane AAROM scaption             Prone IL ->T             Table slides scaption             Cross body stretch :10x10 arm below shoulder height :10x10 arm below shoulder height :10x10       :10x10 arm below shoulder height   Table slides ER :10x5 :10x5 :10x5       :10x5   Sleeper stretch IR :10x5 :10x5 :10x5       :10x5                                                       Ther Activity                                       Gait Training                                       Modalities

## 2021-03-29 ENCOUNTER — OFFICE VISIT (OUTPATIENT)
Dept: PHYSICAL THERAPY | Facility: CLINIC | Age: 66
End: 2021-03-29
Payer: COMMERCIAL

## 2021-03-29 DIAGNOSIS — M75.42 IMPINGEMENT SYNDROME OF LEFT SHOULDER: Primary | ICD-10-CM

## 2021-03-29 PROCEDURE — 97140 MANUAL THERAPY 1/> REGIONS: CPT | Performed by: PHYSICAL THERAPIST

## 2021-03-29 PROCEDURE — 97110 THERAPEUTIC EXERCISES: CPT | Performed by: PHYSICAL THERAPIST

## 2021-03-29 NOTE — PROGRESS NOTES
Daily Note      Today's date: 3/29/2021  Patient name: Dylon Rendon  : 1955  MRN: [id number]  Referring provider: Gisela Fernando MD  Dx:   Encounter Diagnosis     ICD-10-CM    1  Impingement syndrome of left shoulder  M75 42               Subjective: patient reports continued difficulty lying on her sides but symptoms improve when supine and she was feeling better during the days this      Objective: See treatment diary below      Assessment: Tolerated treatment well  Patient exhibited good technique with therapeutic exercises and would benefit from continued PT  Progressed to wedge with table ER stretching  Plan: Progress treatment as tolerated         Precautions: none      Manuals 3/19 3/22 3/25 3/29      3/15   PROM all SY MC SY SY      MC   Left scap ROM SY MC SY SY      MC                             Neuro Re-Ed                                                    Ther Ex             Row   ytb to neutral 20 GTB neutral 20          shld ext   ytb to neutral 20 GTB neutral 20          ER iso sidelying             Band ER             Biceps curls iso             Stool seated roll outs Chair table arm slides :10x10 Chair table arm slides :10x10 Chair table arm slides :10x10 :10x10      Chair table arm slides :1010   pball rolls             Thoracic ext arms crossed 20 20 20 20      20   IR iso             Cane AAROM scaption             Prone IL ->T             Table slides scaption             Cross body stretch :10x10 arm below shoulder height :10x10 arm below shoulder height :10x10 :[id number]      :10x10 arm below shoulder height   Table slides ER :10x5 :10x5 :10x5 With wedge :10x10 bilat      :10x5   Sleeper stretch IR :10x5 :10x5 :10x5 :10x5ea      :10x5                                                       Ther Activity                                       Gait Training                                       Modalities

## 2021-04-01 ENCOUNTER — OFFICE VISIT (OUTPATIENT)
Dept: PHYSICAL THERAPY | Facility: CLINIC | Age: 66
End: 2021-04-01
Payer: COMMERCIAL

## 2021-04-01 DIAGNOSIS — M75.42 IMPINGEMENT SYNDROME OF LEFT SHOULDER: Primary | ICD-10-CM

## 2021-04-01 PROCEDURE — 97140 MANUAL THERAPY 1/> REGIONS: CPT | Performed by: PHYSICAL THERAPIST

## 2021-04-01 PROCEDURE — 97110 THERAPEUTIC EXERCISES: CPT | Performed by: PHYSICAL THERAPIST

## 2021-04-01 NOTE — PROGRESS NOTES
Daily Note     Today's date: 2021  Patient name: Todd Biswas  : 1955  MRN: [id number]  Referring provider: Shalonda Caro MD  Dx:   Encounter Diagnosis     ICD-10-CM    1  Impingement syndrome of left shoulder  M75 42               Subjective: patient reports she was sore immediately after last treatment but soreness subsided        Objective: See treatment diary below      Assessment: Tolerated treatment well  Patient exhibited good technique with therapeutic exercises and would benefit from continued PT  No significant soreness directly after stretching  Fleixon PROM was tight but no painful but there was some pinching with ER PROM depending on orientation to the frontal plane  Plan: Progress treatment as tolerated         Precautions: none      Manuals 3/19 3/22 3/25 3/29 4/1     3/15   PROM all SY MC SY SY SY     MC   Left scap ROM SY MC SY SY SY     MC                             Neuro Re-Ed                                                    Ther Ex             Row   ytb to neutral 20 GTB neutral 20 GTB 20 GTB 20        shld ext   ytb to neutral 20 GTB neutral 20 GTB 20 GTB 20        ER iso sidelying             Band ER             Biceps curls iso             Stool seated roll outs Chair table arm slides :10x10 Chair table arm slides :10x10 Chair table arm slides :10x10 :10x10 :10x10     Chair table arm slides :1010   pball rolls             Thoracic ext arms crossed 20 20 20 20 20     20   IR iso             Cane AAROM scaption             Prone IL ->T             Table slides scaption             Cross body stretch :10x10 arm below shoulder height :10x10 arm below shoulder height :10x10 :80a50rg :10x10     :10x10 arm below shoulder height   Table slides ER :10x5 :10x5 :10x5 With wedge :10x10 bilat :10x10     :10x5   Sleeper stretch IR :10x5 :10x5 :10x5 :10x5ea :10x5ea     :10x5                                                       Ther Activity                                       Gait Training                                       Modalities

## 2021-04-05 ENCOUNTER — APPOINTMENT (OUTPATIENT)
Dept: PHYSICAL THERAPY | Facility: CLINIC | Age: 66
End: 2021-04-05
Payer: COMMERCIAL

## 2021-04-06 ENCOUNTER — OFFICE VISIT (OUTPATIENT)
Dept: PHYSICAL THERAPY | Facility: CLINIC | Age: 66
End: 2021-04-06
Payer: COMMERCIAL

## 2021-04-06 DIAGNOSIS — M75.42 IMPINGEMENT SYNDROME OF LEFT SHOULDER: Primary | ICD-10-CM

## 2021-04-06 PROCEDURE — 97112 NEUROMUSCULAR REEDUCATION: CPT | Performed by: PHYSICAL THERAPIST

## 2021-04-06 PROCEDURE — 97110 THERAPEUTIC EXERCISES: CPT | Performed by: PHYSICAL THERAPIST

## 2021-04-06 PROCEDURE — 97140 MANUAL THERAPY 1/> REGIONS: CPT | Performed by: PHYSICAL THERAPIST

## 2021-04-06 NOTE — PROGRESS NOTES
Daily Note     Today's date: 2021  Patient name: Jenna Yu  : 1955  MRN: [id number]  Referring provider: Herson Balbuena MD  Dx:   Encounter Diagnosis     ICD-10-CM    1  Impingement syndrome of left shoulder  M75 42               Subjective: patient reports he shoulder did fairly well holding her grandchild and she did not have much residual soreness      Objective: See treatment diary below      Assessment: Tolerated treatment well  Patient exhibited good technique with therapeutic exercises and would benefit from continued PT  Tightness with flexion and ER initially, resolved with stretching  IR continues to be restricted       Plan: Progress treatment as tolerated         Precautions: none      Manuals 3/19 3/22 3/25 3/29 4/1 4/6    3/15   PROM all SY MC SY SY SY SY left    MC   Left scap ROM SY MC SY SY SY SY    MC                             Neuro Re-Ed                                                    Ther Ex             Row   ytb to neutral 20 GTB neutral 20 GTB 20 GTB 20 GTB 20       shld ext   ytb to neutral 20 GTB neutral 20 GTB 20 GTB 20 GTB20       ER iso sidelying             Band ER             Biceps curls iso             Stool seated roll outs Chair table arm slides :10x10 Chair table arm slides :10x10 Chair table arm slides :10x10 :10x10 :10x10 :10x10    Chair table arm slides :1010   pball rolls             Thoracic ext arms crossed 20 20 20 20 20 20    20   IR iso             Cane AAROM scaption             Prone IL ->T             Table slides scaption             Cross body stretch :10x10 arm below shoulder height :10x10 arm below shoulder height :10x10 :06t21dp :10x10 :10x10    :10x10 arm below shoulder height   Table slides ER :10x5 :10x5 :10x5 With wedge :10x10 bilat :10x10 :10x10    :10x5   Sleeper stretch IR :10x5 :10x5 :10x5 :10x5ea :10x5ea :10x5ea    :10x5                                                       Ther Activity                                       Gait Training                                       Modalities

## 2021-04-08 ENCOUNTER — OFFICE VISIT (OUTPATIENT)
Dept: PHYSICAL THERAPY | Facility: CLINIC | Age: 66
End: 2021-04-08
Payer: COMMERCIAL

## 2021-04-08 DIAGNOSIS — M75.42 IMPINGEMENT SYNDROME OF LEFT SHOULDER: Primary | ICD-10-CM

## 2021-04-08 PROCEDURE — 97110 THERAPEUTIC EXERCISES: CPT

## 2021-04-08 PROCEDURE — 97140 MANUAL THERAPY 1/> REGIONS: CPT

## 2021-04-08 PROCEDURE — 97112 NEUROMUSCULAR REEDUCATION: CPT

## 2021-04-08 NOTE — PROGRESS NOTES
Daily Note     Today's date: 2021  Patient name: George Garcia  : 1955  MRN: [id number]  Referring provider: Master Mello MD  Dx:   Encounter Diagnosis     ICD-10-CM    1  Impingement syndrome of left shoulder  M75 42                   Subjective: Patient reports occassional L shoulder soreness if she moves it certain ways but other times she has no pain at all  Objective: See treatment diary below      Assessment: Tolerated treatment well  Mild tightness into PROM IR initially which responded well to manual stretching  FOTO score improved to 76 from 49 (predicted 67)  Patient exhibited good technique with therapeutic exercises and would benefit from continued PT      Plan: Progress treatment as tolerated         Precautions: none      Manuals 3/19 3/22 3/25 3/29 4/1 4/6 4/8      PROM all SY MC SY SY SY SY left MC left      Left scap ROM SY MC SY SY SY SY MC                                Neuro Re-Ed                                                    Ther Ex             Row   ytb to neutral 20 GTB neutral 20 GTB 20 GTB 20 GTB 20 GTB 20      shld ext   ytb to neutral 20 GTB neutral 20 GTB 20 GTB 20 GTB20 GTB 20      ER iso sidelying             Band ER             Biceps curls iso             Stool seated roll outs Chair table arm slides :10x10 Chair table arm slides :10x10 Chair table arm slides :10x10 :10x10 :10x10 :10x10 :10x10      pball rolls             Thoracic ext arms crossed 20 20 20 20 20 20 20      IR iso             Cane AAROM scaption             Prone IL ->T             Table slides scaption             Cross body stretch :10x10 arm below shoulder height :10x10 arm below shoulder height :10x10 :58r51nh :10x10 :10x10 :10x10      Table slides ER :10x5 :10x5 :10x5 With wedge :10x10 bilat :10x10 :10x10 :10x10      Sleeper stretch IR :10x5 :10x5 :10x5 :10x5ea :10x5ea :10x5ea :10x5 ea                                                          Ther Activity Gait Training                                       Modalities

## 2021-04-12 ENCOUNTER — OFFICE VISIT (OUTPATIENT)
Dept: PHYSICAL THERAPY | Facility: CLINIC | Age: 66
End: 2021-04-12
Payer: COMMERCIAL

## 2021-04-12 DIAGNOSIS — M75.42 IMPINGEMENT SYNDROME OF LEFT SHOULDER: Primary | ICD-10-CM

## 2021-04-12 PROCEDURE — 97110 THERAPEUTIC EXERCISES: CPT | Performed by: PHYSICAL THERAPIST

## 2021-04-12 PROCEDURE — 97112 NEUROMUSCULAR REEDUCATION: CPT | Performed by: PHYSICAL THERAPIST

## 2021-04-12 PROCEDURE — 97140 MANUAL THERAPY 1/> REGIONS: CPT | Performed by: PHYSICAL THERAPIST

## 2021-04-12 NOTE — PROGRESS NOTES
Daily Note     Today's date: 2021  Patient name: Esa Simons  : 1955  MRN: [id number]  Referring provider: Vale Ordoñez MD  Dx:   Encounter Diagnosis     ICD-10-CM    1  Impingement syndrome of left shoulder  M75 42                   Subjective: patient reports her shoulders are doing well  She is having an easier time reaching across the right shoulder but continued difficulty reaching towards the axillary region  Objective: See treatment diary below      Assessment: Tolerated treatment well  Patient exhibited good technique with therapeutic exercises and would benefit from continued PT  Added ER to the row to increase activation of RTC  Tightness noted with flexion stretching with some pain during overpressure      Plan: Progress treatment as tolerated         Precautions: none      Manuals 3/19 3/22 3/25 3/29 4/1 4/6 4/8 4/12     PROM all SY MC SY SY SY SY left MC left SY left     Left scap ROM SY MC SY SY SY SY MC SY                               Neuro Re-Ed                                                    Ther Ex             Row   ytb to neutral 20 GTB neutral 20 GTB 20 GTB 20 GTB 20 GTB 20 GTB 30     shld ext   ytb to neutral 20 GTB neutral 20 GTB 20 GTB 20 GTB20 GTB 20 GTB 20     Row with ER        RTB 20     Band ER             Biceps curls iso             Stool seated roll outs Chair table arm slides :10x10 Chair table arm slides :10x10 Chair table arm slides :10x10 :10x10 :10x10 :10x10 :10x10 :10x10     pball rolls             Thoracic ext arms crossed 20 20 20 20 20 20 20 20     IR iso             Cane AAROM scaption             Prone IL ->T             Table slides scaption             Cross body stretch :10x10 arm below shoulder height :10x10 arm below shoulder height :10x10 :46h53xk :10x10 :10x10 :10x10 :10x10     Table slides ER :10x5 :10x5 :10x5 With wedge :10x10 bilat :10x10 :10x10 :10x10 :10x10     Sleeper stretch IR :10x5 :10x5 :10x5 :10x5ea :10x5ea :10x5ea :10x5 ea :10x5ea                                                         Ther Activity                                       Gait Training                                       Modalities

## 2021-04-15 ENCOUNTER — OFFICE VISIT (OUTPATIENT)
Dept: PHYSICAL THERAPY | Facility: CLINIC | Age: 66
End: 2021-04-15
Payer: COMMERCIAL

## 2021-04-15 DIAGNOSIS — M75.42 IMPINGEMENT SYNDROME OF LEFT SHOULDER: Primary | ICD-10-CM

## 2021-04-15 PROCEDURE — 97110 THERAPEUTIC EXERCISES: CPT

## 2021-04-15 PROCEDURE — 97112 NEUROMUSCULAR REEDUCATION: CPT

## 2021-04-15 PROCEDURE — 97140 MANUAL THERAPY 1/> REGIONS: CPT

## 2021-04-15 NOTE — PROGRESS NOTES
Daily Note     Today's date: 4/15/2021  Patient name: Shahrzad Alaniz  : 1955  MRN: [id number]  Referring provider: Irineo Sunshine MD  Dx: -  Encounter Diagnosis     ICD-10-CM    1  Impingement syndrome of left shoulder  M75 42                   Subjective: Patient reports she woke up with a little more soreness in L shoulder today which she attributes to sleeping position or possibly the damp weather  She experienced transient muscular soreness after last session which resolved by the next morning  Objective: See treatment diary below      Assessment: Tolerated treatment well  Patient exhibited good technique with therapeutic exercises and would benefit from continued PT  Did not progress program due to soreness after last session  No pain with manual flexion stretching with OP today which is an improvement  Plan: Progress treatment as tolerated         Precautions: none      Manuals 3/19 3/22 3/25 3/29 4/1 4/6 4/8 4/12 4/15    PROM all SY MC SY SY SY SY left MC left SY left MC left    Left scap ROM SY MC SY SY SY SY MC SY MC                              Neuro Re-Ed                                                    Ther Ex             Row   ytb to neutral 20 GTB neutral 20 GTB 20 GTB 20 GTB 20 GTB 20 GTB 30 GTB 30    shld ext   ytb to neutral 20 GTB neutral 20 GTB 20 GTB 20 GTB20 GTB 20 GTB 20 GTB 20    Row with ER        RTB 20 RTB 20    Band ER             Biceps curls iso             Stool seated roll outs Chair table arm slides :10x10 Chair table arm slides :10x10 Chair table arm slides :10x10 :10x10 :10x10 :10x10 :10x10 :10x10 :10x10    pball rolls             Thoracic ext arms crossed 20 20 20 20 20 20 20 20 20    IR iso             Cane AAROM scaption             Prone IL ->T             Table slides scaption             Cross body stretch :10x10 arm below shoulder height :10x10 arm below shoulder height :10x10 :42d15wn :10x10 :10x10 :10x10 :10x10 :10x10    Table slides ER :10x5 :10x5 :10x5 With wedge :10x10 bilat :10x10 :10x10 :10x10 :10x10 :10x10    Sleeper stretch IR :10x5 :10x5 :10x5 :10x5ea :10x5ea :10x5ea :10x5 ea :10x5ea :10x5 ea                                                        Ther Activity                                       Gait Training                                       Modalities

## 2021-04-19 ENCOUNTER — OFFICE VISIT (OUTPATIENT)
Dept: PHYSICAL THERAPY | Facility: CLINIC | Age: 66
End: 2021-04-19
Payer: COMMERCIAL

## 2021-04-19 DIAGNOSIS — M75.42 IMPINGEMENT SYNDROME OF LEFT SHOULDER: Primary | ICD-10-CM

## 2021-04-19 PROCEDURE — 97110 THERAPEUTIC EXERCISES: CPT | Performed by: PHYSICAL THERAPIST

## 2021-04-19 PROCEDURE — 97112 NEUROMUSCULAR REEDUCATION: CPT | Performed by: PHYSICAL THERAPIST

## 2021-04-19 PROCEDURE — 97140 MANUAL THERAPY 1/> REGIONS: CPT | Performed by: PHYSICAL THERAPIST

## 2021-04-19 NOTE — PROGRESS NOTES
Daily Note     Today's date: 2021  Patient name: George Garcia  : 1955  MRN: [id number]  Referring provider: Master Mello MD  Dx:   Encounter Diagnosis     ICD-10-CM    1  Impingement syndrome of left shoulder  M75 42               Subjective: patient reports her shoulder is feeling good even with a lot of activity over the weekend  She woke with soreness this morning after sleeping on it      Objective: See treatment diary below      Assessment: Tolerated treatment well  Patient exhibited good technique with therapeutic exercises and would benefit from continued PT  No pain reported throughout treatment except for anterior shoulder tightness with cross body stretch  Plan: Progress treatment as tolerated         Precautions: none      Manuals 3/19 3/22 3/25 3/29 4/1 4/6 4/8 4/12 4/15 4/19   PROM all SY MC SY SY SY SY left MC left SY left MC left SY   Left scap ROM SY MC SY SY SY SY MC SY MC SY                             Neuro Re-Ed                                                    Ther Ex             Row   ytb to neutral 20 GTB neutral 20 GTB 20 GTB 20 GTB 20 GTB 20 GTB 30 GTB 30 GTB 30   shld ext   ytb to neutral 20 GTB neutral 20 GTB 20 GTB 20 GTB20 GTB 20 GTB 20 GTB 20 GTB 30   Row with ER        RTB 20 RTB 20 GTB 30   Band ER             Biceps curls iso             Stool seated roll outs Chair table arm slides :10x10 Chair table arm slides :10x10 Chair table arm slides :10x10 :10x10 :10x10 :10x10 :10x10 :10x10 :10x10 :10x10   pball rolls             Thoracic ext arms crossed 20 20 20 20 20 20 20 20 20 20   IR iso             Cane AAROM scaption             Prone IL ->T             Table slides scaption             Cross body stretch :10x10 arm below shoulder height :10x10 arm below shoulder height :10x10 :30r29kg :10x10 :10x10 :10x10 :10x10 :10x10 :10x10   Table slides ER :10x5 :10x5 :10x5 With wedge :10x10 bilat :10x10 :10x10 :10x10 :10x10 :10x10 :10x10   Sleeper stretch IR :10x5 :10x5 :10x5 :10x5ea :10x5ea :10x5ea :10x5 ea :10x5ea :10x5 ea :10x5ea                                                       Ther Activity                                       Gait Training                                       Modalities

## 2021-04-22 ENCOUNTER — OFFICE VISIT (OUTPATIENT)
Dept: PHYSICAL THERAPY | Facility: CLINIC | Age: 66
End: 2021-04-22
Payer: COMMERCIAL

## 2021-04-22 DIAGNOSIS — M75.42 IMPINGEMENT SYNDROME OF LEFT SHOULDER: Primary | ICD-10-CM

## 2021-04-22 PROCEDURE — 97140 MANUAL THERAPY 1/> REGIONS: CPT

## 2021-04-22 PROCEDURE — 97112 NEUROMUSCULAR REEDUCATION: CPT

## 2021-04-22 PROCEDURE — 97110 THERAPEUTIC EXERCISES: CPT

## 2021-04-22 NOTE — PROGRESS NOTES
Daily Note     Today's date: 2021  Patient name: Shahrzad Alaniz  : 1955  MRN: [id number]  Referring provider: Irineo Sunshine MD  Dx:   Encounter Diagnosis     ICD-10-CM    1  Impingement syndrome of left shoulder  M75 42                   Subjective: Pt presents to today's session reporting overall improvement in (L) shoulder strength and ROM, however pt continues to have slight difficulty getting dressed in the morning especially during pulling motions  Objective: See treatment diary below      Assessment: Tolerated treatment well  (L) shoulder PROM continues to improve in all motions, noting mild discomfort towards end-range ER  Pt demonstrates good periscapular control during RTC series, while avoiding UT compensation  Patient demonstrated fatigue post treatment and would benefit from continued PT      Plan: Continue per plan of care  Today's session was treated by KENAN Jerry, with direct supervision of Chinyere Sanchez PTA         Precautions: none      Manuals 4/22   3/29 4/1 4/6 4/8 4/12 4/15 4/19   PROM all JM   SY SY SY left MC left SY left MC left SY   Left scap ROM JM   SY SY SY MC SY MC SY                             Neuro Re-Ed                                                    Ther Ex             Row  GTB 30   GTB 20 GTB 20 GTB 20 GTB 20 GTB 30 GTB 30 GTB 30   shld ext  nv   GTB 20 GTB 20 GTB20 GTB 20 GTB 20 GTB 20 GTB 30   Row with ER GTB 30       RTB 20 RTB 20 GTB 30   Band ER             Biceps curls iso             Stool seated roll outs :10x10   :10x10 :10x10 :10x10 :10x10 :10x10 :10x10 :10x10   pball rolls             Thoracic ext arms crossed 20x   20 20 20 20 20 20 20   IR iso             Cane AAROM scaption             Prone IL ->T             Table slides scaption             Cross body stretch :88k47ry   :66p48rz :10x10 :10x10 :10x10 :10x10 :10x10 :10x10   Table slides ER :10x10  bilat   With wedge :10x10 bilat :10x10 :10x10 :10x10 :10x10 :10x10 :10x10 Sleeper stretch IR :10x5ea   :10x5ea :10x5ea :10x5ea :10x5 ea :10x5ea :10x5 ea :10x5ea                                                       Ther Activity                                       Gait Training                                       Modalities

## 2021-04-26 ENCOUNTER — OFFICE VISIT (OUTPATIENT)
Dept: PHYSICAL THERAPY | Facility: CLINIC | Age: 66
End: 2021-04-26
Payer: COMMERCIAL

## 2021-04-26 DIAGNOSIS — M75.42 IMPINGEMENT SYNDROME OF LEFT SHOULDER: Primary | ICD-10-CM

## 2021-04-26 PROCEDURE — 97112 NEUROMUSCULAR REEDUCATION: CPT | Performed by: PHYSICAL THERAPIST

## 2021-04-26 PROCEDURE — 97110 THERAPEUTIC EXERCISES: CPT | Performed by: PHYSICAL THERAPIST

## 2021-04-26 PROCEDURE — 97140 MANUAL THERAPY 1/> REGIONS: CPT | Performed by: PHYSICAL THERAPIST

## 2021-04-26 NOTE — PROGRESS NOTES
Daily Note     Today's date: 2021  Patient name: Navin Marx  : 1955  MRN: [id number]  Referring provider: Anupama Atkinson MD  Dx:   Encounter Diagnosis     ICD-10-CM    1  Impingement syndrome of left shoulder  M75 42                   Subjective: patient reports her shoulder is feeling pretty good; She is still not lying on the left side when going to sleep      Objective: See treatment diary below      Assessment: Tolerated treatment well  Patient exhibited good technique with therapeutic exercises and would benefit from continued PT  No pain with PROM stretches, mild tightness with IR      Plan: Progress treatment as tolerated         Precautions: none      Manuals 4/22 4/26  3/29 4/1 4/6 4/8 4/12 4/15 4/19   PROM all JM SY left  SY SY SY left MC left SY left MC left SY   Left scap ROM JM SY  SY SY SY MC SY MC SY                             Neuro Re-Ed                                                    Ther Ex             Row  GTB 30 G30  GTB 20 GTB 20 GTB 20 GTB 20 GTB 30 GTB 30 GTB 30   shld ext  nv G30  GTB 20 GTB 20 GTB20 GTB 20 GTB 20 GTB 20 GTB 30   Row with ER GTB 30 G30      RTB 20 RTB 20 GTB 30   Band ER             Biceps curls iso             Stool seated roll outs :10x10   :10x10 :10x10 :10x10 :10x10 :10x10 :10x10 :10x10   pball rolls             Thoracic ext arms crossed 20x 20x  20 20 20 20 20 20 20   IR iso             Cane AAROM scaption             Prone IL ->T             Table slides scaption             Cross body stretch :33b45uq :21k75kq  :55u17ku :10x10 :10x10 :10x10 :10x10 :10x10 :10x10   Table slides ER :10x10  bilat :12t63fnqxp with wedge  With wedge :10x10 bilat :10x10 :10x10 :10x10 :10x10 :10x10 :10x10   Sleeper stretch IR :10x5ea :10x5ea  :10x5ea :10x5ea :10x5ea :10x5 ea :10x5ea :10x5 ea :10x5ea                                                       Ther Activity                                       Gait Training                                       Modalities

## 2021-04-29 ENCOUNTER — OFFICE VISIT (OUTPATIENT)
Dept: PHYSICAL THERAPY | Facility: CLINIC | Age: 66
End: 2021-04-29
Payer: COMMERCIAL

## 2021-04-29 DIAGNOSIS — M75.42 IMPINGEMENT SYNDROME OF LEFT SHOULDER: Primary | ICD-10-CM

## 2021-04-29 PROCEDURE — 97110 THERAPEUTIC EXERCISES: CPT

## 2021-04-29 PROCEDURE — 97112 NEUROMUSCULAR REEDUCATION: CPT

## 2021-04-29 PROCEDURE — 97140 MANUAL THERAPY 1/> REGIONS: CPT

## 2021-04-29 NOTE — PROGRESS NOTES
Daily Note     Today's date: 2021  Patient name: Dinorah Donnelly  : 1955  MRN: [id number]  Referring provider: Juan Oh MD  Dx:   Encounter Diagnosis     ICD-10-CM    1  Impingement syndrome of left shoulder  M75 42                   Subjective: Pt reports noticing overall improvement in (L) shoulder Sx and ROM, however pt notes continued difficulty with certain motions such as reaching across the body, especially in the morning  Objective: See treatment diary below      Assessment: Tolerated treatment well  Patient demonstrated fatigue post treatment and would benefit from continued PT  Mild discomfort noted in anterior deltoid/pec minor attachment, which resolves after a break  Progressed RTC, noting mild fatigue after  (L) IR continues to be the most limited during PROM  Pt notes mild discomfort towards end-range IR>ER  Plan: Progress treatment as tolerated  Today's session was treated by KENAN West, with direct supervision of Aleena Rankin PTA         Precautions: none      Manuals 4/22 4/26 4/29   4/6 4/8 4/12 4/15 4/19   PROM all JM SY left JM left   SY left MC left SY left MC left SY   Left scap ROM JM SY    SY MC SY MC SY                             Neuro Re-Ed                                                    Ther Ex             Row  GTB 30 G30 BlueTB 20x   GTB 20 GTB 20 GTB 30 GTB 30 GTB 30   shld ext  nv G30 BlueTB 20x   GTB20 GTB 20 GTB 20 GTB 20 GTB 30   Row with ER GTB 30 G30 BlueTB 20x     RTB 20 RTB 20 GTB 30   Band ER             Biceps curls iso             Stool seated roll outs :10x10  :10x10   :10x10 :10x10 :10x10 :10x10 :10x10   pball rolls             Thoracic ext arms crossed 20x 20x 20x   20 20 20 20 20   IR iso             Cane AAROM scaption             Prone IL ->T             Table slides scaption             Cross body stretch :27u82ak :20b56ie :29w27eo   :10x10 :10x10 :10x10 :10x10 :10x10   Table slides ER :10x10  bilat :82f49tgzae with wedge :39l03vfddw with wedge   :10x10 :10x10 :10x10 :10x10 :10x10   Sleeper stretch IR :10x5ea :10x5ea :10x5ea   :10x5ea :10x5 ea :10x5ea :10x5 ea :10x5ea                                                       Ther Activity                                       Gait Training                                       Modalities

## 2021-05-03 ENCOUNTER — OFFICE VISIT (OUTPATIENT)
Dept: PHYSICAL THERAPY | Facility: CLINIC | Age: 66
End: 2021-05-03
Payer: COMMERCIAL

## 2021-05-03 DIAGNOSIS — M75.42 IMPINGEMENT SYNDROME OF LEFT SHOULDER: Primary | ICD-10-CM

## 2021-05-03 PROCEDURE — 97110 THERAPEUTIC EXERCISES: CPT | Performed by: PHYSICAL THERAPIST

## 2021-05-03 PROCEDURE — 97140 MANUAL THERAPY 1/> REGIONS: CPT | Performed by: PHYSICAL THERAPIST

## 2021-05-03 PROCEDURE — 97112 NEUROMUSCULAR REEDUCATION: CPT | Performed by: PHYSICAL THERAPIST

## 2021-05-03 NOTE — PROGRESS NOTES
Daily Note     Today's date: 5/3/2021  Patient name: Josafat Joiner  : 1955  MRN: [id number]  Referring provider: Magaly Cantor MD  Dx:   Encounter Diagnosis     ICD-10-CM    1  Impingement syndrome of left shoulder  M75 42               Subjective: patient reports her shoulder is doing well, she has less mobility in her left arm when reaching overhead in ER while in supine compared to the right  Objective: See treatment diary below      Assessment: Tolerated treatment well  Patient exhibited good technique with therapeutic exercises and would benefit from continued PT Soreness noted after stretching and she will skip a round of exercises later today  Plan: Progress treatment as tolerated         Precautions: none      Manuals 4/22 4/26 4/29 5/3  4/6 4/8 4/12 4/15 4/19   PROM all JM SY left JM left SY  SY left MC left SY left MC left SY   Left scap ROM JM SY    SY MC SY MC SY                             Neuro Re-Ed                                                    Ther Ex             Row  GTB 30 G30 BlueTB 20x Blue 20  GTB 20 GTB 20 GTB 30 GTB 30 GTB 30   shld ext  nv G30 BlueTB 20x Blue 20  GTB20 GTB 20 GTB 20 GTB 20 GTB 30   Row with ER GTB 30 G30 BlueTB 20x Blue 20    RTB 20 RTB 20 GTB 30   Band ER             Biceps curls iso             Stool seated roll outs :10x10  :10x10 :10x10  :10x10 :10x10 :10x10 :10x10 :10x10   pball rolls             Thoracic ext arms crossed 20x 20x 20x   20 20 20 20 20   IR iso             Cane AAROM scaption             Prone IL ->T             Table slides scaption             Cross body stretch :64o66yg :84t65mv :72o98zf :21x26mh  :10x10 :10x10 :10x10 :10x10 :10x10   Table slides ER :10x10  bilat :67s66fhvbw with wedge :09j71blesg with wedge :40k61kwwsq with wedge  :10x10 :10x10 :10x10 :10x10 :10x10   Sleeper stretch IR :10x5ea :10x5ea :10x5ea :10x5ea  :10x5ea :10x5 ea :10x5ea :10x5 ea :10x5ea                                                       Ther Activity Gait Training                                       Modalities

## 2021-05-06 ENCOUNTER — OFFICE VISIT (OUTPATIENT)
Dept: PHYSICAL THERAPY | Facility: CLINIC | Age: 66
End: 2021-05-06
Payer: COMMERCIAL

## 2021-05-06 DIAGNOSIS — M75.42 IMPINGEMENT SYNDROME OF LEFT SHOULDER: Primary | ICD-10-CM

## 2021-05-06 PROCEDURE — 97110 THERAPEUTIC EXERCISES: CPT | Performed by: PHYSICAL THERAPIST

## 2021-05-06 PROCEDURE — 97140 MANUAL THERAPY 1/> REGIONS: CPT | Performed by: PHYSICAL THERAPIST

## 2021-05-06 PROCEDURE — 97112 NEUROMUSCULAR REEDUCATION: CPT | Performed by: PHYSICAL THERAPIST

## 2021-05-06 NOTE — PROGRESS NOTES
Daily Note     Today's date: 2021  Patient name: Leigh Li  : 1955  MRN: [id number]  Referring provider: Kinsey Martinez MD  Dx:   Encounter Diagnosis     ICD-10-CM    1  Impingement syndrome of left shoulder  M75 42               Subjective: patient reports she was sore the day of and after last treatment but her shoulder has recovered by today      Objective: See treatment diary below      Assessment: Tolerated treatment well  Patient exhibited good technique with therapeutic exercises and would benefit from continued PT  Did not progress treatment due to soreness after last treatment  Mild soreness with end range flexion and external rotation  Plan: Progress treatment as tolerated         Precautions: none      Manuals 4/22 4/26 4/29 5/3 5/6        PROM all JM SY left JM left SY SY        Left scap ROM JM SY                                     Neuro Re-Ed                                                    Ther Ex             Row  GTB 30 G30 BlueTB 20x Blue 20 Blue 30        shld ext  nv G30 BlueTB 20x Blue 20 Blue 20        Row with ER GTB 30 G30 BlueTB 20x Blue 20 Blue 20        Band ER             Biceps curls iso             Stool seated roll outs :10x10  :10x10 :10x10 :10x10        pball rolls             Thoracic ext arms crossed 20x 20x 20x  20        IR iso             Cane AAROM scaption             Prone IL ->T             Table slides scaption             Cross body stretch :83s98fz :79g72om :17f77sr :59z59xz :33l24ez in sidelying        Table slides ER :10x10  bilat :31g94qxkaa with wedge :13b26bvwvy with wedge :10x10 bilat with wedge :81t69gq         Sleeper stretch IR :10x5ea :10x5ea :10x5ea :10x5ea :10x5ea                                                            Ther Activity                                       Gait Training                                       Modalities

## 2021-05-10 ENCOUNTER — OFFICE VISIT (OUTPATIENT)
Dept: PHYSICAL THERAPY | Facility: CLINIC | Age: 66
End: 2021-05-10
Payer: COMMERCIAL

## 2021-05-10 DIAGNOSIS — M75.42 IMPINGEMENT SYNDROME OF LEFT SHOULDER: Primary | ICD-10-CM

## 2021-05-10 PROCEDURE — 97112 NEUROMUSCULAR REEDUCATION: CPT | Performed by: PHYSICAL THERAPIST

## 2021-05-10 PROCEDURE — 97110 THERAPEUTIC EXERCISES: CPT | Performed by: PHYSICAL THERAPIST

## 2021-05-10 PROCEDURE — 97140 MANUAL THERAPY 1/> REGIONS: CPT | Performed by: PHYSICAL THERAPIST

## 2021-05-10 NOTE — PROGRESS NOTES
Daily Note     Today's date: 5/10/2021  Patient name: Preeti Judd  : 1955  MRN: [id number]  Referring provider: Scott Madsen MD  Dx:   Encounter Diagnosis     ICD-10-CM    1  Impingement syndrome of left shoulder  M75 42                   Subjective: patient notes mild soreness with cross body stretch in sidelying but felt it was more of a muscle soreness and not pain      Objective: See treatment diary below       Assessment: Tolerated treatment well  Patient exhibited good technique with therapeutic exercises and would benefit from continued PT  Soreness noted after treatment though patient felt it was similar to soreness after a workout      Plan: Progress treatment as tolerated         Precautions: none      Manuals 4/22 4/26 4/29 5/3 5/6 5/10       PROM LEFT JM SY left JM left SY SY SY       Left scap ROM JM SY                                     Neuro Re-Ed                                                    Ther Ex             Row  GTB 30 G30 BlueTB 20x Blue 20 Blue 30 Blue 30       shld ext  nv G30 BlueTB 20x Blue 20 Blue 20 Blue 30       Row with ER GTB 30 G30 BlueTB 20x Blue 20 Blue 20 Blue 30       W's      Red 20       Band ER             Biceps curls iso             Stool seated roll outs :10x10  :10x10 :10x10 :10x10 :10x10       pball rolls             Thoracic ext arms crossed 20x 20x 20x  20 20       IR iso             Cane AAROM scaption             Prone IL ->T             Table slides scaption             Cross body stretch :10e34xo :51h41md :50p69bj :12i42qd :21f92vi in sidelying :94v07ik in sidelying       Table slides ER :10x10  bilat :78z26jignz with wedge :27q60msyun with wedge :10x10 bilat with wedge :84o82bw  :19d15wy        Sleeper stretch IR :10x5ea :10x5ea :10x5ea :10x5ea :10x5ea :10x5ea                                                           Ther Activity                                       Gait Training                                       Modalities

## 2021-05-13 ENCOUNTER — OFFICE VISIT (OUTPATIENT)
Dept: PHYSICAL THERAPY | Facility: CLINIC | Age: 66
End: 2021-05-13
Payer: COMMERCIAL

## 2021-05-13 DIAGNOSIS — M75.42 IMPINGEMENT SYNDROME OF LEFT SHOULDER: Primary | ICD-10-CM

## 2021-05-13 PROCEDURE — 97112 NEUROMUSCULAR REEDUCATION: CPT

## 2021-05-13 PROCEDURE — 97140 MANUAL THERAPY 1/> REGIONS: CPT

## 2021-05-13 PROCEDURE — 97110 THERAPEUTIC EXERCISES: CPT

## 2021-05-13 NOTE — PROGRESS NOTES
Daily Note     Today's date: 2021  Patient name: Marcela Shaver  : 1955  MRN: [id number]  Referring provider: Lisa Celeste MD  Dx:   Encounter Diagnosis     ICD-10-CM    1  Impingement syndrome of left shoulder  M75 42                   Subjective: patient notes she is still challenged by cross body and IR stretching but its slowly improving  Objective: See treatment diary below       Assessment: Tolerated treatment well  Patient exhibited good technique with therapeutic exercises and would benefit from continued PT  Cues required to avoid UT compensation with TB exercises - pt reported fatigue post tx  IR remains the most limited w/ good tolerance to PROM  Plan: Progress treatment as tolerated         Precautions: none      Manuals 4/22 4/26 4/29 5/3 5/6 5/10 5/13      PROM LEFT JM SY left JM left SY SY SY CV      Left scap ROM JM SY                                     Neuro Re-Ed                                                    Ther Ex             Row  GTB 30 G30 BlueTB 20x Blue 20 Blue 30 Blue 30 Blue 30      shld ext  nv G30 BlueTB 20x Blue 20 Blue 20 Blue 30 Blue 30      Row with ER GTB 30 G30 BlueTB 20x Blue 20 Blue 20 Blue 30 Blue 30      W's      Red 20 Red 20      Band ER             Biceps curls iso             Stool seated roll outs :10x10  :10x10 :10x10 :10x10 :10x10 :10x10      pball rolls             Thoracic ext arms crossed 20x 20x 20x  20 20 20      IR iso             Cane AAROM scaption             Prone IL ->T             Table slides scaption             Cross body stretch :39k87fl :80p43uv :31o65gn :49a72ee :38i77ol in sidelying :89l37zt in sidelying :10x10 in sidelying      Table slides ER :10x10  bilat :13n99ewcix with wedge :26d69iphvj with wedge :10x10 bilat with wedge :79o94wb  :27l51du  :10x10 ea      Sleeper stretch IR :10x5ea :10x5ea :10x5ea :10x5ea :10x5ea :10x5ea :10x5 ea                                                          Ther Activity Gait Training                                       Modalities

## 2021-05-17 ENCOUNTER — OFFICE VISIT (OUTPATIENT)
Dept: PHYSICAL THERAPY | Facility: CLINIC | Age: 66
End: 2021-05-17
Payer: COMMERCIAL

## 2021-05-17 DIAGNOSIS — M75.42 IMPINGEMENT SYNDROME OF LEFT SHOULDER: Primary | ICD-10-CM

## 2021-05-17 PROCEDURE — 97140 MANUAL THERAPY 1/> REGIONS: CPT

## 2021-05-17 PROCEDURE — 97112 NEUROMUSCULAR REEDUCATION: CPT

## 2021-05-17 PROCEDURE — 97110 THERAPEUTIC EXERCISES: CPT

## 2021-05-17 NOTE — PROGRESS NOTES
Daily Note     Today's date: 2021  Patient name: Leyla Hollis  : 1955  MRN: [id number]  Referring provider: Alycia Wolf MD  Dx:   Encounter Diagnosis     ICD-10-CM    1  Impingement syndrome of left shoulder  M75 42                   Subjective: Patient reports some days L shoulder feels "great" with no pain at all though she continues to experience slight discomfort after repetitive motions and overuse such as washing windows over the weekend  She also notes continued stiffness when reaching across her body to shave opposite under arm  Objective: See treatment diary below      Assessment: Tolerated treatment well  Patient exhibited good technique with therapeutic exercises and would benefit from continued PT   "Clicking" in R shoulder reported with row + ER though no pain  Continues to present with mild tightness in L posterior shoulder which responds well to sleeper and manual stretching  Plan: Progress treatment as tolerated          Precautions: none      Manuals 4/22 4/26 4/29 5/3 5/6 5/10 5/13 5/17     PROM LEFT JM SY left JM left SY SY SY CV MC     Left scap ROM JM SY                                     Neuro Re-Ed                                                    Ther Ex             Row  GTB 30 G30 BlueTB 20x Blue 20 Blue 30 Blue 30 Blue 30 Blue 30     shld ext  nv G30 BlueTB 20x Blue 20 Blue 20 Blue 30 Blue 30 Blue 30     Row with ER GTB 30 G30 BlueTB 20x Blue 20 Blue 20 Blue 30 Blue 30 Blue 30     W's      Red 20 Red 20 Red 20     Band ER             Biceps curls iso             Stool seated roll outs :10x10  :10x10 :10x10 :10x10 :10x10 :10x10 :10x10     pball rolls             Thoracic ext arms crossed 20x 20x 20x  20 20 20 20     IR iso             Cane AAROM scaption             Prone IL ->T             Table slides scaption             Cross body stretch :87b44be :80u76xq :84o30in :11h63pf :49l15sv in sidelying :70k23ol in sidelying :10x10 in sidelying :10x10 in SL Table slides ER :10x10  bilat :29z61iktrp with wedge :76s70hofcx with wedge :10x10 bilat with wedge :88b16eh  :23o49wo  :10x10 ea :10x10 ea     Sleeper stretch IR :10x5ea :10x5ea :10x5ea :10x5ea :10x5ea :10x5ea :10x5 ea :10x5 ea                                                         Ther Activity                                       Gait Training                                       Modalities

## 2021-05-20 ENCOUNTER — OFFICE VISIT (OUTPATIENT)
Dept: PHYSICAL THERAPY | Facility: CLINIC | Age: 66
End: 2021-05-20
Payer: COMMERCIAL

## 2021-05-20 DIAGNOSIS — M75.42 IMPINGEMENT SYNDROME OF LEFT SHOULDER: Primary | ICD-10-CM

## 2021-05-20 PROCEDURE — 97140 MANUAL THERAPY 1/> REGIONS: CPT | Performed by: PHYSICAL THERAPIST

## 2021-05-20 PROCEDURE — 97112 NEUROMUSCULAR REEDUCATION: CPT | Performed by: PHYSICAL THERAPIST

## 2021-05-20 PROCEDURE — 97110 THERAPEUTIC EXERCISES: CPT | Performed by: PHYSICAL THERAPIST

## 2021-05-20 NOTE — PROGRESS NOTES
PT Re-EVALUATION     Today's date: 21  Patient name: Marcela Shaver  : 1955  MRN: [id number]  Referring provider: Lisa Celeste MD  Dx:   1  Impingement syndrome of left shoulder          Cm Mendez has progressed well with increased ROM, increased strength and improved tolerance to ADLs  She continues to have difficulty reaching up the low back and higher overhead  Her left shoulder also fatigues quicker and gets achy with activity  This patient would benefit from skilled physical therapy to address their listed impairments and functional limitations to maximize functional outcome      Impairments:    restricted ROM    decreased strength   pain with function   activity intolerance      Prognosis:  Good  Positive and negative prognostic indicator(s):  prior success with conservative care     Goals:    STG Patient is independent with HEP (met)  STG Range of motion is improved by 25% in 2 weeks (met)  LTG Range of motion is improved by 50% in 4 weeks (partially met, resume)  LTG No limitations with bed mobility in 4 weeks (partially met, resume)  LTG reach up low back to L1 in 4 weeks     Planned interventions:  home exercise program, manual therapy, graded activity, flexibility, functional range of motion exercises and strengthening     Duration in visits:  6  Frequency: 2-3 visits per week  Duration in weeks:  2-3     History of Current Injury: patient reports rolling onto her left shoulder in early  and felt the head of the humerus moved in the socket  She reports improved ability to reach over head, across her body, and lie on the left side  Peak symptoms have significantly decreased though she still gets aching after stretching/exercises    She continues to have difficulty reaching up the low back and her shoulder will fatigue with increased activity (compared to the right)     Pain location: around the shoulder, sometimes down the upper arm to the elbow  Pain descriptors:  aching  Pain intensity:  episodes of 3/10 pain some times transient shots of pain  Higher  (improved from 10/10)     Aggravating factors: reaching across the body, reaching backwards, sleeping on either side  Easing factors: avoiding above activities     Imaging: none  Patient goals:  decreased pain, independence with ADLs, increased mobility and increased strength     Objective      Tenderness   Left Shoulder   Tenderness in the biceps tendon (proximal), bicipital groove and coracoid process       Active Range of Motion   Left Shoulder   Flexion: 150 (from 120 degrees with pain)  Extension: 34 (from 28 degrees with pain)  Abduction: 154 (from 108 degrees with pain)  Internal rotation BTB: L5  (from sacrum with pain)    Right Shoulder   Flexion: 170 degrees   Extension: 52 degrees   Abduction: 170 degrees   Internal rotation BTB: T6      Strength/Myotome Testing     Left Shoulder      Planes of Motion   Flexion: 4 (from 3-)  Extension: 4 (from 3)   Abduction: 4 (from 3-)  External rotation at 0°: 4+   Internal rotation at 0°: 5       Daily Note     Today's date: 2021  Patient name: Kirstin Gonzalez  : 1955  MRN: [id number]  Referring provider: Desmond Cabrera MD  Dx:   Encounter Diagnosis     ICD-10-CM    1   Impingement syndrome of left shoulder  M75 42                  Precautions: none      Manuals 4/22 4/26 4/29 5/3 5/6 5/10 5/13 5/17 5/20    PROM LEFT JM SY left JM left SY SY SY CV MC SY    Left scap ROM JM SY                                     Neuro Re-Ed                                                    Ther Ex             Row  GTB 30 G30 BlueTB 20x Blue 20 Blue 30 Blue 30 Blue 30 Blue 30 Blue 30    shld ext  nv G30 BlueTB 20x Blue 20 Blue 20 Blue 30 Blue 30 Blue 30 Blue 30    Row with ER GTB 30 G30 BlueTB 20x Blue 20 Blue 20 Blue 30 Blue 30 Blue 30 Blue 30    W's      Red 20 Red 20 Red 20 Red 20    Band ER             Biceps curls iso             Stool seated roll outs :10x10  :10x10 :10x10 :10x10 :10x10 :10x10 :10x10 :10x10    pball rolls             Thoracic ext arms crossed 20x 20x 20x  20 20 20 20 20    IR iso             Cane AAROM scaption             Prone IL ->T             Table slides scaption             Cross body stretch :19w85bh :52u28sn :38x75rf :88d95gx :39n83yq in sidelying :15c60sp in sidelying :10x10 in sidelying :10x10 in SL :10x10 in SL    Table slides ER :10x10  bilat :91y60mbgzl with wedge :29c97jtnke with wedge :10x10 bilat with wedge :86r12ot  :10j44nu  :10x10 ea :10x10 ea :67v13pn    Sleeper stretch IR :10x5ea :10x5ea :10x5ea :10x5ea :10x5ea :10x5ea :10x5 ea :10x5 ea :10x5ea    Corner stretch, hands just above elbow height         :10x5                                           Ther Activity                                       Gait Training                                       Modalities

## 2021-05-24 ENCOUNTER — OFFICE VISIT (OUTPATIENT)
Dept: PHYSICAL THERAPY | Facility: CLINIC | Age: 66
End: 2021-05-24
Payer: COMMERCIAL

## 2021-05-24 DIAGNOSIS — M75.42 IMPINGEMENT SYNDROME OF LEFT SHOULDER: Primary | ICD-10-CM

## 2021-05-24 PROCEDURE — 97112 NEUROMUSCULAR REEDUCATION: CPT | Performed by: PHYSICAL THERAPIST

## 2021-05-24 PROCEDURE — 97140 MANUAL THERAPY 1/> REGIONS: CPT | Performed by: PHYSICAL THERAPIST

## 2021-05-24 PROCEDURE — 97110 THERAPEUTIC EXERCISES: CPT | Performed by: PHYSICAL THERAPIST

## 2021-05-24 NOTE — PROGRESS NOTES
Daily Note     Today's date: 2021  Patient name: Brad Razo  : 1955  MRN: [id number]  Referring provider: Deandre Rodriguez MD  Dx:   Encounter Diagnosis     ICD-10-CM    1  Impingement syndrome of left shoulder  M75 42               Subjective: patient reports her shoulder is feeling much better, the new stretches are going well      Objective: See treatment diary below      Assessment: Tolerated treatment well  Patient exhibited good technique with therapeutic exercises and would benefit from continued PT  No pain with TB exercises, just muscle fatigue  Plan: Progress treatment as tolerated         Precautions: none      Manuals 4/22 4/26 4/29 5/3 5/6 5/10 5/13 5/17 5/20 5/24   PROM LEFT JM SY left JM left SY SY SY CV MC SY SY   Left scap ROM JM SY                                     Neuro Re-Ed                                                    Ther Ex             Row  GTB 30 G30 BlueTB 20x Blue 20 Blue 30 Blue 30 Blue 30 Blue 30 Blue 30 Blue 30   shld ext  nv G30 BlueTB 20x Blue 20 Blue 20 Blue 30 Blue 30 Blue 30 Blue 30 Blue 30   Row with ER GTB 30 G30 BlueTB 20x Blue 20 Blue 20 Blue 30 Blue 30 Blue 30 Blue 30 Blue 30   W's      Red 20 Red 20 Red 20 Red 20 Red 20   Band ER             Biceps curls iso             Stool seated roll outs :10x10  :10x10 :10x10 :10x10 :10x10 :10x10 :10x10 :10x10 :10x10   pball rolls             Thoracic ext arms crossed 20x 20x 20x  20 20 20 20 20 20   IR iso             Cane AAROM scaption             Prone IL ->T             Table slides scaption             Cross body stretch :23c47uo :20z34yq :91j52oi :75k09cl :09h45pu in sidelying :70b87au in sidelying :10x10 in sidelying :10x10 in SL :10x10 in SL :10x10 in SL   Table slides ER :10x10  bilat :35o56gfryz with wedge :03z91eggnd with wedge :10x10 bilat with wedge :96b39yq  :21e54ch  :10x10 ea :10x10 ea :42a95xb :54b88tq   Sleeper stretch IR :10x5ea :10x5ea :10x5ea :10x5ea :10x5ea :10x5ea :10x5 ea :10x5 ea :10x5ea :10x5ea   Corner stretch, hands just above elbow height         :10x5 :10x5                                          Ther Activity                                       Gait Training                                       Modalities

## 2021-05-27 ENCOUNTER — OFFICE VISIT (OUTPATIENT)
Dept: PHYSICAL THERAPY | Facility: CLINIC | Age: 66
End: 2021-05-27
Payer: COMMERCIAL

## 2021-05-27 DIAGNOSIS — M75.42 IMPINGEMENT SYNDROME OF LEFT SHOULDER: Primary | ICD-10-CM

## 2021-05-27 PROCEDURE — 97140 MANUAL THERAPY 1/> REGIONS: CPT

## 2021-05-27 PROCEDURE — 97110 THERAPEUTIC EXERCISES: CPT

## 2021-05-27 NOTE — PROGRESS NOTES
Daily Note     Today's date: 2021  Patient name: Bennett Sanderson  : 1955  MRN: [id number]  Referring provider: Shadi Whyte MD  Dx:   Encounter Diagnosis     ICD-10-CM    1  Impingement syndrome of left shoulder  M75 42                   Subjective: Patient reports "I did something to my L shoulder yesterday and am having increased pain"  She reports reaching cross body in shower followed by seated thoracic extension with arms behind head and experiencing increased L anterior shoulder pain after  She notes difficulty sleeping last night and increased pain with arm swinging during walk this morning  Objective: See treatment diary below      Assessment: Tolerated treatment well  Patient exhibited good technique with therapeutic exercises and would benefit from continued PT  Mild IR tightness remains, otherwise no decrease in PROM  Held doorway stretch and sleeper stretch as patient was having anterior shoulder pain despite cueing for proper form  Also held TB exercises to avoid exacerbating current symptoms, resume as tolerated  Plan: Progress treatment as tolerated         Precautions: none      Manuals 5/27   5/3 5/6 5/10 5/13 5/17 5/20 5/24   PROM LEFT MC   SY SY SY CV MC SY SY   Left scap ROM                                       Neuro Re-Ed                                                    Ther Ex             Row  nv   Blue 20 Blue 30 Blue 30 Blue 30 Blue 30 Blue 30 Blue 30   shld ext  nv   Blue 20 Blue 20 Blue 30 Blue 30 Blue 30 Blue 30 Blue 30   Row with ER nv   Blue 20 Blue 20 Blue 30 Blue 30 Blue 30 Blue 30 Blue 30   W's      Red 20 Red 20 Red 20 Red 20 Red 20   Band ER             Biceps curls iso             Stool seated roll outs :10x10   :10x10 :10x10 :10x10 :10x10 :10x10 :10x10 :10x10   pball rolls             Thoracic ext arms crossed 20    20 20 20 20 20 20   IR iso             Cane AAROM scaption             Prone IL ->T             Table slides scaption Cross body stretch :10x10 in SL   :96l16cm :26j89lq in sidelying :86p94ux in sidelying :10x10 in sidelying :10x10 in SL :10x10 in SL :10x10 in SL   Table slides ER :10x10 ea   :10x10 bilat with wedge :73s66oc  :52k11ty  :10x10 ea :10x10 ea :37g70az :46r41qi   Sleeper stretch IR :10x5 anterior p!   :10x5ea :10x5ea :10x5ea :10x5 ea :10x5 ea :10x5ea :10x5ea   Corner stretch, hands just above elbow height held        :10x5 :10x5                                          Ther Activity                                       Gait Training                                       Modalities

## 2021-06-01 ENCOUNTER — OFFICE VISIT (OUTPATIENT)
Dept: PHYSICAL THERAPY | Facility: CLINIC | Age: 66
End: 2021-06-01
Payer: COMMERCIAL

## 2021-06-01 DIAGNOSIS — M75.42 IMPINGEMENT SYNDROME OF LEFT SHOULDER: Primary | ICD-10-CM

## 2021-06-01 PROCEDURE — 97140 MANUAL THERAPY 1/> REGIONS: CPT | Performed by: PHYSICAL THERAPIST

## 2021-06-01 PROCEDURE — 97112 NEUROMUSCULAR REEDUCATION: CPT | Performed by: PHYSICAL THERAPIST

## 2021-06-01 PROCEDURE — 97110 THERAPEUTIC EXERCISES: CPT | Performed by: PHYSICAL THERAPIST

## 2021-06-01 NOTE — PROGRESS NOTES
Daily Note     Today's date: 2021  Patient name: Leigh Li  : 1955  MRN: [id number]  Referring provider: Kinsey Martinez MD  Dx:   Encounter Diagnosis     ICD-10-CM    1  Impingement syndrome of left shoulder  M75 42                   Subjective: patient reports her shoulder is feeling better like everything has settled back in place  She is still more sore than she was prior to the last set back      Objective: See treatment diary below      Assessment: Tolerated treatment well  Patient exhibited good technique with therapeutic exercises and would benefit from continued PT  Gentle PROM today, reduced overhead stretching specifically  Reduced TB reps to avoid exacerbation of symptoms      Plan: Progress treatment as tolerated  Precautions: none      Manuals 5/27 6/1  5/3 5/6 5/10 5/13 5/17 5/20 5/24   PROM LEFT MC SY  SY SY SY CV MC SY SY   Left scap ROM                                       Neuro Re-Ed                                                    Ther Ex             Row  nv Blue 20  Blue 20 Blue 30 Blue 30 Blue 30 Blue 30 Blue 30 Blue 30   shld ext  nv Blue 20  Blue 20 Blue 20 Blue 30 Blue 30 Blue 30 Blue 30 Blue 30   Row with ER nv Blue 20  Blue 20 Blue 20 Blue 30 Blue 30 Blue 30 Blue 30 Blue 30   W's  Green 20    Red 20 Red 20 Red 20 Red 20 Red 20   Band ER             Biceps curls iso             Stool seated roll outs :10x10 :10x10  :10x10 :10x10 :10x10 :10x10 :10x10 :10x10 :10x10   pball rolls             Thoracic ext arms crossed 20 20   20 20 20 20 20 20   IR iso             Cane AAROM scaption             Prone IL ->T             Table slides scaption             Cross body stretch :10x10 in SL :10x10 in sl  :43r33yo :68d49ey in sidelying :43w67if in sidelying :10x10 in sidelying :10x10 in SL :10x10 in SL :10x10 in SL   Table slides ER :10x10 ea :30o89sm  :10x10 bilat with wedge :06h85rz  :47m54ui  :10x10 ea :10x10 ea :05d24ip :40x69yi   Sleeper stretch IR :10x5 anterior p! :10x5  :10x5ea :10x5ea :10x5ea :10x5 ea :10x5 ea :10x5ea :10x5ea   Corner stretch, hands just above elbow height held        :10x5 :10x5                                          Ther Activity                                       Gait Training                                       Modalities

## 2021-06-04 ENCOUNTER — OFFICE VISIT (OUTPATIENT)
Dept: PHYSICAL THERAPY | Facility: CLINIC | Age: 66
End: 2021-06-04
Payer: COMMERCIAL

## 2021-06-04 DIAGNOSIS — M75.42 IMPINGEMENT SYNDROME OF LEFT SHOULDER: Primary | ICD-10-CM

## 2021-06-04 PROCEDURE — 97110 THERAPEUTIC EXERCISES: CPT | Performed by: PHYSICAL THERAPIST

## 2021-06-04 PROCEDURE — 97140 MANUAL THERAPY 1/> REGIONS: CPT | Performed by: PHYSICAL THERAPIST

## 2021-06-04 PROCEDURE — 97112 NEUROMUSCULAR REEDUCATION: CPT | Performed by: PHYSICAL THERAPIST

## 2021-06-04 NOTE — PROGRESS NOTES
Daily Note     Today's date: 2021  Patient name: George Garcia  : 1955  MRN: [id number]  Referring provider: Master Mello MD  Dx:   Encounter Diagnosis     ICD-10-CM    1  Impingement syndrome of left shoulder  M75 42                 Subjective: patient reports her shoulder is similar to earlier in the week, still doing OK but not as good as it was before      Objective: See treatment diary below      Assessment: Tolerated treatment well  Patient exhibited good technique with therapeutic exercises and would benefit from continued PT  She has having some left mid to low back pain so TB exercises eased      Plan: Progress treatment as tolerated         Precautions: none      Manuals           PROM LEFT MC SY SY          Left scap ROM                                       Neuro Re-Ed                                                    Ther Ex             Row  nv Blue 20 Blue 20          shld ext  nv Blue 20 Blue 20          Row with ER nv Blue 20 Blue 20          W's  Green 20           Band ER             Biceps curls iso             Stool seated roll outs :10x10 :10x10           pball rolls             Thoracic ext arms crossed 20 20 20          IR iso             Cane AAROM scaption             Prone IL ->T             Table slides scaption             Cross body stretch :10x10 in SL :10x10 in sl :10x10 seated          Table slides ER :10x10 ea :34q20nn ":54u70ta          Sleeper stretch IR :10x5 anterior p! :10x5 :10x5          Corner stretch, hands just above elbow height held                                                   Ther Activity                                       Gait Training                                       Modalities

## 2021-06-07 ENCOUNTER — OFFICE VISIT (OUTPATIENT)
Dept: PHYSICAL THERAPY | Facility: CLINIC | Age: 66
End: 2021-06-07
Payer: COMMERCIAL

## 2021-06-07 DIAGNOSIS — M75.42 IMPINGEMENT SYNDROME OF LEFT SHOULDER: Primary | ICD-10-CM

## 2021-06-07 PROCEDURE — 97140 MANUAL THERAPY 1/> REGIONS: CPT

## 2021-06-07 PROCEDURE — 97110 THERAPEUTIC EXERCISES: CPT

## 2021-06-07 PROCEDURE — 97112 NEUROMUSCULAR REEDUCATION: CPT

## 2021-06-07 NOTE — PROGRESS NOTES
Daily Note     Today's date: 2021  Patient name: Shahrzad Alaniz  : 1955  MRN: [id number]  Referring provider: Irineo Sunshine MD   Dx:   Encounter Diagnosis     ICD-10-CM    1  Impingement syndrome of left shoulder  M75 42                   Subjective: Patient reports she has been able to sleep on L side though she continues to experience increased L shoulder soreness in the morning  Objective: See treatment diary below      Assessment: Tolerated treatment well  Patient exhibited good technique with therapeutic exercises and would benefit from continued PT  Continued tightness into PROM IR which responds well to manual stretching  Plan: Progress treatment as tolerated         Precautions: none      Manuals          PROM LEFT MC SY SY MC         Left scap ROM                                       Neuro Re-Ed                                                    Ther Ex             Row  nv Blue 20 Blue 20 Blue 20         shld ext  nv Blue 20 Blue 20 Blue 20         Row with ER nv Blue 20 Blue 20 Blue 20         W's  Green 20           Band ER             Biceps curls iso             Stool seated roll outs :10x10 :10x10           pball rolls             Thoracic ext arms crossed 20 20 20 20         IR iso             Cane AAROM scaption             Prone IL ->T             Table slides scaption             Cross body stretch :10x10 in SL :10x10 in sl :10x10 seated :10x10 seated         Table slides ER :10x10 ea :67c71ae ":81b39vt :10x10 ea         Sleeper stretch IR :10x5 anterior p! :10x5 :10x5 :10x5         Corner stretch, hands just above elbow height held                                                   Ther Activity                                       Gait Training                                       Modalities

## 2021-06-10 ENCOUNTER — OFFICE VISIT (OUTPATIENT)
Dept: PHYSICAL THERAPY | Facility: CLINIC | Age: 66
End: 2021-06-10
Payer: COMMERCIAL

## 2021-06-10 DIAGNOSIS — M75.42 IMPINGEMENT SYNDROME OF LEFT SHOULDER: Primary | ICD-10-CM

## 2021-06-10 PROCEDURE — 97110 THERAPEUTIC EXERCISES: CPT | Performed by: PHYSICAL THERAPIST

## 2021-06-10 PROCEDURE — 97140 MANUAL THERAPY 1/> REGIONS: CPT | Performed by: PHYSICAL THERAPIST

## 2021-06-10 PROCEDURE — 97112 NEUROMUSCULAR REEDUCATION: CPT | Performed by: PHYSICAL THERAPIST

## 2021-06-10 NOTE — PROGRESS NOTES
Daily Note     Today's date: 6/10/2021  Patient name: Esa Simons  : 1955  MRN: [id number]  Referring provider: Vale Ordoñez MD  Dx:   Encounter Diagnosis     ICD-10-CM    1  Impingement syndrome of left shoulder  M75 42               Subjective: patient reports her shoulder is better than last week  There is mild soreness in the shoulder possibly from fully horizontally adduct the arms during thoracic extension stretching  Objective: See treatment diary below      Assessment: Tolerated treatment well  Patient exhibited good technique with therapeutic exercises and would benefit from continued PT  Increased TB exercises to prior levels      Plan: Progress treatment as tolerated         Precautions: none      Manuals 5/27 6/1 6/4 6/7 6/10        PROM LEFT MC SY SY MC SY        Left scap ROM                                       Neuro Re-Ed                                                    Ther Ex             Row  nv Blue 20 Blue 20 Blue 20 Blue 30        shld ext  nv Blue 20 Blue 20 Blue 20 Blue 30        Row with ER nv Blue 20 Blue 20 Blue 20 Blue 30        W's  Green 20   Green 20        Band ER             Biceps curls iso             Stool seated roll outs :10x10 :10x10           pball rolls             Thoracic ext arms crossed 20 20 20 20 20        IR iso             Cane AAROM scaption             Prone IL ->T             Table slides scaption             Cross body stretch :10x10 in SL :10x10 in sl :10x10 seated :10x10 seated :10x10        Table slides ER :10x10 ea :00h81yg ":85f53fu :10x10 ea :93f92dp using stool        Sleeper stretch IR :10x5 anterior p! :10x5 :10x5 :10x5 :10x5        Corner stretch, hands just above elbow height held                                                   Ther Activity                                       Gait Training                                       Modalities

## 2021-06-16 ENCOUNTER — OFFICE VISIT (OUTPATIENT)
Dept: PHYSICAL THERAPY | Facility: CLINIC | Age: 66
End: 2021-06-16
Payer: COMMERCIAL

## 2021-06-16 DIAGNOSIS — M75.42 IMPINGEMENT SYNDROME OF LEFT SHOULDER: Primary | ICD-10-CM

## 2021-06-16 PROCEDURE — 97110 THERAPEUTIC EXERCISES: CPT | Performed by: PHYSICAL THERAPIST

## 2021-06-16 PROCEDURE — 97112 NEUROMUSCULAR REEDUCATION: CPT | Performed by: PHYSICAL THERAPIST

## 2021-06-16 PROCEDURE — 97140 MANUAL THERAPY 1/> REGIONS: CPT | Performed by: PHYSICAL THERAPIST

## 2021-06-16 NOTE — PROGRESS NOTES
Daily Note     Today's date: 2021  Patient name: Jay Cuello  : 1955  MRN: [id number]  Referring provider: Iwona Mcclellan MD  Dx:   Encounter Diagnosis     ICD-10-CM    1  Impingement syndrome of left shoulder  M75 42               Subjective: patient reports she had muscle soreness after holding a baby over the weekend but no pain  She continues to have tightness and pain with abduction with external rotation      Objective: See treatment diary below      Assessment: Tolerated treatment well  Patient exhibited good technique with therapeutic exercises and would benefit from continued PT  Mild tightness with ER and internal rotation but minimal pain with stretching      Plan: Progress treatment as tolerated         Precautions: none      Manuals 5/27 6/1 6/4 6/7 6/10 6/16       PROM LEFT MC SY SY MC SY SY       Left scap ROM                                       Neuro Re-Ed                                                    Ther Ex             Row  nv Blue 20 Blue 20 Blue 20 Blue 30 Blue 30       shld ext  nv Blue 20 Blue 20 Blue 20 Blue 30 Blue 30       Row with ER nv Blue 20 Blue 20 Blue 20 Blue 30 Blue 30       W's  Green 20   Green 20 Red 20, noted left ulnar nn  irritation       Band ER             Biceps curls iso             Stool seated roll outs :10x10 :10x10           pball rolls             Thoracic ext arms crossed 20 20 20 20 20 20       IR iso             Cane AAROM scaption             Prone IL ->T             Table slides scaption             Cross body stretch :10x10 in SL :10x10 in sl :10x10 seated :10x10 seated :10x10 :10x10       Table slides ER :10x10 ea :[id number] ":[id number] :10x10 ea :57k01hp using stool :89r49ji using stool       Sleeper stretch IR :10x5 anterior p! :10x5 :10x5 :10x5 :10x5 :10x5       Corner stretch, hands just above elbow height held            Flexion table slides      :10x10                                 Ther Activity Gait Training                                       Modalities

## 2021-06-21 ENCOUNTER — OFFICE VISIT (OUTPATIENT)
Dept: PHYSICAL THERAPY | Facility: CLINIC | Age: 66
End: 2021-06-21
Payer: COMMERCIAL

## 2021-06-21 DIAGNOSIS — M75.42 IMPINGEMENT SYNDROME OF LEFT SHOULDER: Primary | ICD-10-CM

## 2021-06-21 PROCEDURE — 97110 THERAPEUTIC EXERCISES: CPT

## 2021-06-21 PROCEDURE — 97112 NEUROMUSCULAR REEDUCATION: CPT

## 2021-06-21 PROCEDURE — 97140 MANUAL THERAPY 1/> REGIONS: CPT

## 2021-06-21 NOTE — PROGRESS NOTES
Daily Note     Today's date: 2021  Patient name: Navin Marx  : 1955  MRN: [id number]  Referring provider: Anupama Atkinson MD  Dx:   Encounter Diagnosis     ICD-10-CM    1  Impingement syndrome of left shoulder  M75 42                   Subjective: Patient reports L shoulder has felt good since last session though she continues to feel LUE "just won't move any further if I move it a certain way"  She was able to reach for seatbelt with L hand "slowly" for the first time in a while and is now able to sleep on L side with typically no discomfort the next morning  Objective: See treatment diary below      Assessment: Tolerated treatment well  Patient exhibited good technique with therapeutic exercises and would benefit from continued PT  Continued with current POC due to positive response last visit  Responds well to posterior pressure on anterior shoulder during manual ER stretching  Discussed weaning to 1 visit per week next month due to improvement in symptoms overall  Plan: Progress treatment as tolerated         Precautions: none      Manuals 5/27 6/1 6/4 6/7 6/10 6/16 6/21      PROM LEFT MC SY SY MC SY SY MC      Left scap ROM                                       Neuro Re-Ed                                                    Ther Ex             Row  nv Blue 20 Blue 20 Blue 20 Blue 30 Blue 30 Blue 30      shld ext  nv Blue 20 Blue 20 Blue 20 Blue 30 Blue 30 Blue 30      Row with ER nv Blue 20 Blue 20 Blue 20 Blue 30 Blue 30 Blue 30      W's  Green 20   Green 20 Red 20, noted left ulnar nn  irritation RTB 20      Band ER             Biceps curls iso             Stool seated roll outs :10x10 :10x10           pball rolls             Thoracic ext arms crossed 20 20 20 20 20 20 20      IR iso             Cane AAROM scaption             Prone IL ->T             Table slides scaption             Cross body stretch :10x10 in SL :10x10 in sl :10x10 seated :10x10 seated :10x10 :10x10 :10x10 Table slides ER :10x10 ea :30m85gj ":20q65ei :10x10 ea :54t56wi using stool :82m91nk using stool :10x10 ea using stool      Sleeper stretch IR :10x5 anterior p! :10x5 :10x5 :10x5 :10x5 :10x5 :10x5      Corner stretch, hands just above elbow height held            Flexion table slides      :10x10 :10x10                                Ther Activity                                       Gait Training                                       Modalities

## 2021-06-24 ENCOUNTER — OFFICE VISIT (OUTPATIENT)
Dept: PHYSICAL THERAPY | Facility: CLINIC | Age: 66
End: 2021-06-24
Payer: COMMERCIAL

## 2021-06-24 DIAGNOSIS — M75.42 IMPINGEMENT SYNDROME OF LEFT SHOULDER: Primary | ICD-10-CM

## 2021-06-24 PROCEDURE — 97110 THERAPEUTIC EXERCISES: CPT | Performed by: PHYSICAL THERAPIST

## 2021-06-24 PROCEDURE — 97140 MANUAL THERAPY 1/> REGIONS: CPT | Performed by: PHYSICAL THERAPIST

## 2021-06-24 NOTE — PROGRESS NOTES
Daily Note     Today's date: 2021  Patient name: Esa Simons  : 1955  MRN: [id number]  Referring provider: Vale Ordoñez MD  Dx:   Encounter Diagnosis     ICD-10-CM    1  Impingement syndrome of left shoulder  M75 42                 Subjective: patient states her shoulder is doing better consistently  Objective: See treatment diary below      Assessment: Tolerated treatment well  Patient exhibited good technique with therapeutic exercises and would benefit from continued PT   Consider decreasing to 1x/week + HEP in July  PROM better tolerated with AP pressure to GHJ    Plan: Progress treatment as tolerated         Precautions: none      Manuals 5/27 6/1 6/4 6/7 6/10 6/16 6/21 6/24     PROM LEFT MC SY SY MC SY SY MC SY with AP pressure to GHJ     Left scap ROM                                       Neuro Re-Ed                                                    Ther Ex             Row  nv Blue 20 Blue 20 Blue 20 Blue 30 Blue 30 Blue 30 Blue 30     shld ext  nv Blue 20 Blue 20 Blue 20 Blue 30 Blue 30 Blue 30 Blue 30     Row with ER nv Blue 20 Blue 20 Blue 20 Blue 30 Blue 30 Blue 30 Blue 30     W's  Green 20   Green 20 Red 20, noted left ulnar nn  irritation RTB 20 RTB 20     Band ER             Biceps curls iso             Stool seated roll outs :10x10 :10x10           pball rolls             Thoracic ext arms crossed 20 20 20 20 20 20 20 20     IR iso             Cane AAROM scaption             Prone IL ->T             Table slides scaption             Cross body stretch :10x10 in SL :10x10 in sl :10x10 seated :10x10 seated :10x10 :10x10 :10x10 :10x10     Table slides ER :10x10 ea :[id number] ":59g74ti :10x10 ea :54s42yq using stool :30s22gf using stool :10x10 ea using stool :10x10 ea using stool     Sleeper stretch IR :10x5 anterior p! :10x5 :10x5 :10x5 :10x5 :10x5 :10x5 :10x5     Corner stretch, hands just above elbow height held            Flexion table slides      :10x10 :10x10 :10x10 stool Ther Activity                                       Gait Training                                       Modalities

## 2021-06-28 ENCOUNTER — OFFICE VISIT (OUTPATIENT)
Dept: PHYSICAL THERAPY | Facility: CLINIC | Age: 66
End: 2021-06-28
Payer: COMMERCIAL

## 2021-06-28 DIAGNOSIS — M75.42 IMPINGEMENT SYNDROME OF LEFT SHOULDER: Primary | ICD-10-CM

## 2021-06-28 PROCEDURE — 97140 MANUAL THERAPY 1/> REGIONS: CPT

## 2021-06-28 PROCEDURE — 97110 THERAPEUTIC EXERCISES: CPT

## 2021-06-28 NOTE — PROGRESS NOTES
Daily Note     Today's date: 2021  Patient name: Georgia Goodson  : 1955  MRN: [id number]  Referring provider: Yeimy Bray MD  Dx:   Encounter Diagnosis     ICD-10-CM    1  Impingement syndrome of left shoulder  M75 42                   Subjective: Pt reports her L shldr is feeling pretty good today  "I think we're finally getting somewhere "      Objective: See treatment diary below      Assessment: Tolerated treatment well  Continued with program as outlined below  Was able to perform all exercise with no increase in pain  Most limited with L shldr PROM IR and slight discomfort/achiness at end range in ABD  Patient would benefit from continued PT to further improve strength, increase available ROM, and maximize overall function  Plan: Continue per plan of care  Consider decreasing to 1x/week + HEP in July       Precautions: none      Manuals 5/27 6/1 6/4 6/7 6/10 6/16 6/21 6/24 6/28    PROM LEFT MC SY SY MC SY SY MC SY with AP pressure to GHJ [employer]    Left scap ROM                                       Neuro Re-Ed                                                    Ther Ex             Row  nv Blue 20 Blue 20 Blue 20 Blue 30 Blue 30 Blue 30 Blue 30 Blue 30    shld ext  nv Blue 20 Blue 20 Blue 20 Blue 30 Blue 30 Blue 30 Blue 30 Blue 30    Row with ER nv Blue 20 Blue 20 Blue 20 Blue 30 Blue 30 Blue 30 Blue 30 Blue 30    W's  Green 20   Green 20 Red 20, noted left ulnar nn  irritation RTB 20 RTB 20 RTB 20    Band ER             Biceps curls iso             Stool seated roll outs :10x10 :10x10           pball rolls             Thoracic ext arms crossed 20 20 20 20 20 20 20 20 20    IR iso             Cane AAROM scaption             Prone IL ->T             Table slides scaption             Cross body stretch :10x10 in SL :10x10 in sl :10x10 seated :10x10 seated :10x10 :10x10 :10x10 :10x10 :10x10 ea    Table slides ER :10x10 ea :54c24ce ":32q46of :10x10 ea :61u10ot using stool :32r00pl using stool :10x10 ea using stool :10x10 ea using stool :10x10 ea  using stool    Sleeper stretch IR :10x5 anterior p! :10x5 :10x5 :10x5 :10x5 :10x5 :10x5 :10x5 :10x5    Corner stretch, hands just above elbow height held            Flexion table slides      :10x10 :10x10 :10x10 stool :10x10 stool                              Ther Activity                                       Gait Training                                       Modalities

## 2021-07-01 ENCOUNTER — OFFICE VISIT (OUTPATIENT)
Dept: PHYSICAL THERAPY | Facility: CLINIC | Age: 66
End: 2021-07-01
Payer: COMMERCIAL

## 2021-07-01 DIAGNOSIS — M75.42 IMPINGEMENT SYNDROME OF LEFT SHOULDER: Primary | ICD-10-CM

## 2021-07-01 PROCEDURE — 97140 MANUAL THERAPY 1/> REGIONS: CPT | Performed by: PHYSICAL THERAPIST

## 2021-07-01 PROCEDURE — 97110 THERAPEUTIC EXERCISES: CPT | Performed by: PHYSICAL THERAPIST

## 2021-07-01 NOTE — PROGRESS NOTES
Daily Note     Today's date: 2021  Patient name: Chanell Hodgson  : 1955  MRN: [id number]  Referring provider: Millicent Cardenas MD  Dx:   Encounter Diagnosis     ICD-10-CM    1  Impingement syndrome of left shoulder  M75 42                   Subjective: The patient reports that she was sore the next day after her last session but is not having any pain this morning upon entering the clinic  Objective: See treatment diary below      Assessment: Good tolerance to all TE today  She is demonstrating full PROM t/o her L shoulder for all planes  Issued blue and red TBand to patient and instructed her to continue with TBand exercises at home one time a week as she will be decreasing frequency of attending OPPT to one time per week  Continued PT would be beneficial to improve function  Plan: Continue per plan of care        Precautions: none      Manuals 5/27 6/1 6/4 6/7 6/10 6/16 6/21 6/24 6/28 7/1   PROM LEFT MC SY SY MC SY SY MC SY with AP pressureto GHJ [employer] ML   Left scap ROM                                       Neuro Re-Ed                                                    Ther Ex             Row  nv Blue 20 Blue 20 Blue 20 Blue 30 Blue 30 Blue 30 Blue 30 Blue 30 Blue 30x   Shld ext  nv Blue 20 Blue 20 Blue 20 Blue 30 Blue 30 Blue 30 Blue 30 Blue 30 Blue 30x   Row with ER nv Blue 20 Blue 20 Blue 20 Blue 30 Blue 30 Blue 30 Blue 30 Blue 30 Blue 30x   W's  Green 20   Green 20 Red 20, noted left ulnar nn  irritation RTB 20 RTB 20 RTB 20 RTB 20x   Band ER             Biceps curls iso             Stool seated roll outs :10x10 :10x10           pball rolls             Thoracic ext arms crossed 20 20 20 20 20 20 20 20 20 20x   IR iso             Cane AAROM scaption             Prone IL ->T             Table slides scaption             Cross body stretch :10x10 in SL :10x10 in sl :10x10 seated :10x10 seated :10x10 :10x10 :10x10 :10x10 :10x10 ea 10" x 10 ea   Table slides ER :10x10 ea :54m87jq ":66o20ny :10x10 ea :16z19ql using stool :74v77qc using stool :10x10 ea using stool :10x10 ea using stool :10x10 ea  using stool 10" x 10 ea using stool w/arm on wedge   Sleeper stretch IR :10x5 anterior p! :10x5 :10x5 :10x5 :10x5 :10x5 :10x5 :10x5 :10x5 10" x 5   Corner stretch, hands just above elbow height held            Flexion table slides      :10x10 :10x10 :10x10 stool :10x10 stool 10" x 10 stool                             Ther Activity                                       Gait Training                                       Modalities

## 2021-07-07 ENCOUNTER — OFFICE VISIT (OUTPATIENT)
Dept: PHYSICAL THERAPY | Facility: CLINIC | Age: 66
End: 2021-07-07
Payer: COMMERCIAL

## 2021-07-07 DIAGNOSIS — M75.42 IMPINGEMENT SYNDROME OF LEFT SHOULDER: Primary | ICD-10-CM

## 2021-07-07 PROCEDURE — 97110 THERAPEUTIC EXERCISES: CPT | Performed by: PHYSICAL THERAPIST

## 2021-07-07 PROCEDURE — 97140 MANUAL THERAPY 1/> REGIONS: CPT | Performed by: PHYSICAL THERAPIST

## 2021-07-07 NOTE — PROGRESS NOTES
Daily Note     Today's date: 2021  Patient name: Marjan Roque  : 1955  MRN: [id number]  Referring provider: Lachelle Hudson MD  Dx:   Encounter Diagnosis     ICD-10-CM    1  Impingement syndrome of left shoulder  M75 42               Subjective: patient reports her shoulder is doing OK but continues to feel like it is out of place      Objective: See treatment diary below      Assessment: Tolerated treatment well  Patient exhibited good technique with therapeutic exercises and would benefit from continued PT  No pain PROM or band activities  Mildly restricted ER and flexion  Plan: Progress treatment as tolerated         Precautions: none      Manuals    PROM LEFT SY         ML   Left scap ROM                                       Neuro Re-Ed                                                    Ther Ex             Row  Blue 30         Blue 30x   Shld ext  Blue 30         Blue 30x   Row with ER Blue 30         Blue 30x   W's RTB 30         RTB 20x   Band ER             Biceps curls iso             Stool seated roll outs             pball rolls             Thoracic ext arms crossed 20 starting crossed, then hands behind head         20x   IR iso             Cane AAROM scaption             Prone IL ->T             Table slides scaption             Cross body stretch :10x10         10" x 10 ea   Table slides ER :10 x 10 ea using stool w/arm on wedge         10" x 10 ea using stool w/arm on wedge   Sleeper stretch IR :10x5         10" x 5   Corner stretch, hands just above elbow height             Flexion table slides :10x10         10" x 10 stool                             Ther Activity                                       Gait Training                                       Modalities

## 2021-07-13 ENCOUNTER — OFFICE VISIT (OUTPATIENT)
Dept: PHYSICAL THERAPY | Facility: CLINIC | Age: 66
End: 2021-07-13
Payer: COMMERCIAL

## 2021-07-13 DIAGNOSIS — M75.42 IMPINGEMENT SYNDROME OF LEFT SHOULDER: Primary | ICD-10-CM

## 2021-07-13 PROCEDURE — 97140 MANUAL THERAPY 1/> REGIONS: CPT

## 2021-07-13 PROCEDURE — 97110 THERAPEUTIC EXERCISES: CPT

## 2021-07-13 PROCEDURE — 97112 NEUROMUSCULAR REEDUCATION: CPT

## 2021-07-13 NOTE — PROGRESS NOTES
Daily Note     Today's date: 2021  Patient name: Barbara Gil  : 1955  MRN: [id number]  Referring provider: Fanny Torres MD  Dx:   Encounter Diagnosis     ICD-10-CM    1  Impingement syndrome of left shoulder  M75 42                   Subjective: Patient reports L shoulder is doing well  Objective: See treatment diary below      Assessment: Tolerated treatment well  Patient exhibited good technique with therapeutic exercises and would benefit from continued PT  No pain reported with manual stretching  Patient demonstrates good technique and knowledge of exercise program         Plan: Progress treatment as tolerated         Precautions: none      Manuals    Manual stretching LEFT SY MC        ML   Left scap ROM                                       Neuro Re-Ed                                                    Ther Ex             Row  Blue 30  Blue 30        Blue 30x   Shld ext  Blue 30 Blue 30        Blue 30x   Row with ER Blue 30 Blue 30        Blue 30x   W's RTB 30 RTB 30        RTB 20x   Band ER             Biceps curls iso             Stool seated roll outs             pball rolls             Thoracic ext arms crossed 20 starting crossed, then hands behind head 20 starting crossed, then hands behind head        20x   IR iso             Cane AAROM scaption             Prone IL ->T             Table slides scaption             Cross body stretch :10x10 :10x10        10" x 10 ea   Table slides ER :10 x 10 ea using stool w/arm on wedge :10x10 ea using stool and wedge        10" x 10 ea using stool w/arm on wedge   Sleeper stretch IR :10x5 :10x5        10" x 5   Corner stretch, hands just above elbow height             Flexion table slides :10x10 :10x10        10" x 10 stool                             Ther Activity                                       Gait Training                                       Modalities

## 2021-07-21 ENCOUNTER — EVALUATION (OUTPATIENT)
Dept: PHYSICAL THERAPY | Facility: CLINIC | Age: 66
End: 2021-07-21
Payer: COMMERCIAL

## 2021-07-21 DIAGNOSIS — M75.42 IMPINGEMENT SYNDROME OF LEFT SHOULDER: Primary | ICD-10-CM

## 2021-07-21 PROCEDURE — 97110 THERAPEUTIC EXERCISES: CPT | Performed by: PHYSICAL THERAPIST

## 2021-07-21 PROCEDURE — 97112 NEUROMUSCULAR REEDUCATION: CPT | Performed by: PHYSICAL THERAPIST

## 2021-07-21 PROCEDURE — 97140 MANUAL THERAPY 1/> REGIONS: CPT | Performed by: PHYSICAL THERAPIST

## 2021-07-21 NOTE — PROGRESS NOTES
PT Re-EVALUATION     Today's date: 07/21/21  Patient name: Shanna Jara  BII: [id number]  Referring Melonie Lane MD  Dx:   1  Impingement syndrome of left shoulder          Altamese Ours has progressed well with increased ROM, increased strength and improved tolerance to ADLs  Mild tightness with flexion and end range abduction remain  She is no longer having pain and has been able to use her arm for heavy activities including lifting and carrying   This patient would benefit from skilled physical therapy to address their listed impairments and functional limitations to maximize functional outcome      Impairments:    restricted ROM    decreased strength   pain with function      Prognosis:  Good  Positive and negative prognostic indicator(s):  prior success with conservative care     Goals:    STG Patient is independent with HEP (met)  STG Range of motion is improved by 25% in 2 weeks (met)  LTG Range of motion is improved by 50% in 4 weeks (met))  LTG No limitations with bed mobility in 4 weeks (partially met)  LTG reach up low back to L1 in 4 weeks     Planned interventions:  home exercise program, manual therapy, graded activity, flexibility, functional range of motion exercises and strengthening     Duration in visits:  4  Frequency: 1 visit every 1-2 weeks  Duration in weeks:  4     History of Current Injury: patient reports rolling onto her left shoulder in early 2021 and felt the head of the humerus moved in the socket        She reports improved ability to reach over head, across her body, and lie on the left side  She continues to have difficulty reaching up the low back    She has been able to lifting and carry objects without difficulty including pushing overhead        Pain location: around the shoulder, sometimes down the upper arm to the elbow  Pain descriptors:  aching  Pain intensity:  episodes of 1/10 pain  (improved from 3 and 10/10)     Aggravating factors: reaching up the back, full reach overhead  Easing factors: avoiding above activities     Imaging: none  Patient goals:  decreased pain, independence with ADLs, increased mobility and increased strength     Objective      Tenderness   Left Shoulder   Tenderness in the biceps tendon (proximal), bicipital groove and coracoid process       Active Range of Motion   Left Shoulder   Flexion: 157 (from 150 and 120 degrees with pain)  Extension: 45  (from 34, 28 degrees with pain)  Abduction: 168 (from 154, 108 degrees with pain)  Internal rotation BTB: L5  (from sacrum with pain)    Right Shoulder   Flexion: 170 degrees   Extension: 52 degrees   Abduction: 170 degrees   Internal rotation BTB: T6      Strength/Myotome Testing     Left Shoulder   Planes of Motion   Flexion: 4+ (from 3-)  Extension: 5 (from4 and  3)   Abduction: 4+ (from 3-)  External rotation at 0°: 4+   Internal rotation at 0°: 5          Daily Note     Today's date: 2021  Patient name: Chanell Hodsgon  : 1955  MRN: [id number]  Referring provider: Millicent Cardenas MD  Dx:   Encounter Diagnosis     ICD-10-CM    1   Impingement syndrome of left shoulder  M75 42                    Precautions: none      Manuals    Manual stretching LEFT SY MC SY       ML   Left scap ROM                                       Neuro Re-Ed                                                    Ther Ex             Row  Blue 30  Blue 30  Blue 30       Blue 30x   Shld ext  Blue 30 Blue 30  Blue 30       Blue 30x   Row with ER Blue 30 Blue 30  Blue 30       Blue 30x   W's RTB 30 RTB 30 RTB 30       RTB 20x   Band ER             Biceps curls iso             Stool seated roll outs             pball rolls             Thoracic ext arms crossed 20 starting crossed, then hands behind head 20 starting crossed, then hands behind head 20 starting crossed, then hands behind head       20x   IR iso             Cane AAROM scaption             Prone IL ->T             Table slides scaption             Cross body stretch :10x10 :10x10 :10x10       10" x 10 ea   Table slides ER :10 x 10 ea using stool w/arm on wedge :10x10 ea using stool and wedge :10x10 ea using stool and wedge       10" x 10 ea using stool w/arm on wedge   Sleeper stretch IR :10x5 :10x5 :10x5       10" x 5   Corner stretch, hands just above elbow height             Flexion table slides :10x10 :10x10 :10x10       10" x 10 stool                             Ther Activity                                       Gait Training                                       Modalities

## 2021-07-28 ENCOUNTER — OFFICE VISIT (OUTPATIENT)
Dept: PHYSICAL THERAPY | Facility: CLINIC | Age: 66
End: 2021-07-28
Payer: COMMERCIAL

## 2021-07-28 DIAGNOSIS — M75.42 IMPINGEMENT SYNDROME OF LEFT SHOULDER: Primary | ICD-10-CM

## 2021-07-28 PROCEDURE — 97140 MANUAL THERAPY 1/> REGIONS: CPT | Performed by: PHYSICAL THERAPIST

## 2021-07-28 PROCEDURE — 97112 NEUROMUSCULAR REEDUCATION: CPT | Performed by: PHYSICAL THERAPIST

## 2021-07-28 PROCEDURE — 97110 THERAPEUTIC EXERCISES: CPT | Performed by: PHYSICAL THERAPIST

## 2021-07-28 NOTE — PROGRESS NOTES
Daily Note      Today's date: 2021  Patient name: Erica Figueroa  : 1955  MRN: [id number]  Referring provider: Faina Li MD  Dx:   Encounter Diagnosis     ICD-10-CM    1  Impingement syndrome of left shoulder  M75 42                 Subjective: patient reports decreased tightness when reaching behind her head but she continues to have more tightness on the left compared to right      Objective: See treatment diary below      Assessment: Tolerated treatment well  Patient exhibited good technique with therapeutic exercises and would benefit from continued PT  Mild tightness noted with flexion and abduction stretching, but no pain reported during manual treatment  Plan: Continue per plan of care        Precautions: none      Manuals    Manual stretching LEFT SY MC SY SY      ML   Left scap ROM                                       Neuro Re-Ed                                                    Ther Ex             Row  Blue 30  Blue 30  Blue 30 Black 30      Blue 30x   Shld ext  Blue 30 Blue 30  Blue 30 Blue 30      Blue 30x   Row with ER Blue 30 Blue 30  Blue 30 Blue 30      Blue 30x   W's RTB 30 RTB 30 RTB 30 Green 20      RTB 20x   Band ER             Biceps curls iso             Stool seated roll outs             pball rolls             Thoracic ext arms crossed 20  hands behind head 20  hands behind head 20  hands behind head 20 hands behind head      20x   IR iso             Cane AAROM scaption             Prone IL ->T             Table slides scaption             Cross body stretch :10x10 :10x10 :10x10 :10x10      10" x 10 ea   Table slides ER :10 x 10 ea using stool w/arm on wedge :10x10 ea using stool and wedge :10x10 ea using stool and wedge :10x10 ea using stool and wedge      10" x 10 ea using stool w/arm on wedge   Sleeper stretch IR :10x5 :10x5 :10x5 :10x5      10" x 5   Corner stretch, hands just above elbow height             Flexion table slides :10x10 :10x10 :10x10 :10x10      10" x 10 stool                             Ther Activity                                       Gait Training                                       Modalities

## 2021-08-04 ENCOUNTER — OFFICE VISIT (OUTPATIENT)
Dept: PHYSICAL THERAPY | Facility: CLINIC | Age: 66
End: 2021-08-04
Payer: COMMERCIAL

## 2021-08-04 DIAGNOSIS — M75.42 IMPINGEMENT SYNDROME OF LEFT SHOULDER: Primary | ICD-10-CM

## 2021-08-04 PROCEDURE — 97110 THERAPEUTIC EXERCISES: CPT | Performed by: PHYSICAL THERAPIST

## 2021-08-04 PROCEDURE — 97140 MANUAL THERAPY 1/> REGIONS: CPT | Performed by: PHYSICAL THERAPIST

## 2021-08-04 PROCEDURE — 97112 NEUROMUSCULAR REEDUCATION: CPT | Performed by: PHYSICAL THERAPIST

## 2021-08-04 NOTE — PROGRESS NOTES
Daily Note     Today's date: 2021  Patient name: Shital Mcdaniel  : 1955  MRN: [id number]  Referring provider: Gigi Mesa MD  Dx:   Encounter Diagnosis     ICD-10-CM    1  Impingement syndrome of left shoulder  M75 42               Subjective: patient states her shoulder is feeling good this week      Objective: See treatment diary below      Assessment: Tolerated treatment well  Patient exhibited good technique with therapeutic exercises and would benefit from continued PT  ER stretching tolerated well, especially with AP pressure to the shoulder      Plan: Progress treatment as tolerated         Precautions: none      Manuals    Manual stretching LEFT SY MC SY SY SY     ML   Left scap ROM                                       Neuro Re-Ed                                                    Ther Ex             Row  Blue 30  Blue 30  Blue 30 Black 30 Black 25     Blue 30x   Shld ext  Blue 30 Blue 30  Blue 30 Blue 30 Blue 30     Blue 30x   Row with ER Blue 30 Blue 30  Blue 30 Blue 30 Blue 30     Blue 30x   W's RTB 30 RTB 30 RTB 30 Green 20 Green 20     RTB 20x   Band ER             Biceps curls iso             Stool seated roll outs             pball rolls             Thoracic ext over chair 20  hands behind head 20  hands behind head 20  hands behind head 20 hands behind head 20 hands behind head     20x   IR iso             Cane AAROM scaption             Prone IL ->T             Table slides scaption             Cross body stretch :10x10 :10x10 :10x10 :10x10 :10x10     10" x 10 ea   Table slides ER :10 x 10 ea using stool w/arm on wedge :10x10 ea using stool and wedge :10x10 ea using stool and wedge :10x10 ea using stool and wedge :10x10 ea using stool and wedge     10" x 10 ea using stool w/arm on wedge   Sleeper stretch IR :10x5 :10x5 :10x5 :10x5 :10x5     10" x 5   Corner stretch, hands just above elbow height             Flexion table slides :10x10 :10x10 :10x10 :10x10 :10x10     10" x 10 stool                             Ther Activity                                       Gait Training                                       Modalities

## 2021-08-13 ENCOUNTER — OFFICE VISIT (OUTPATIENT)
Dept: FAMILY MEDICINE CLINIC | Facility: CLINIC | Age: 66
End: 2021-08-13
Payer: COMMERCIAL

## 2021-08-13 VITALS
DIASTOLIC BLOOD PRESSURE: 70 MMHG | HEIGHT: 65 IN | RESPIRATION RATE: 16 BRPM | SYSTOLIC BLOOD PRESSURE: 118 MMHG | BODY MASS INDEX: 24.16 KG/M2 | WEIGHT: 145 LBS | TEMPERATURE: 98.1 F | HEART RATE: 72 BPM

## 2021-08-13 DIAGNOSIS — R19.7 DIARRHEA, UNSPECIFIED TYPE: Primary | ICD-10-CM

## 2021-08-13 DIAGNOSIS — M53.84 THORACIC SPINE DYSFUNCTION: ICD-10-CM

## 2021-08-13 PROCEDURE — 99213 OFFICE O/P EST LOW 20 MIN: CPT | Performed by: PHYSICIAN ASSISTANT

## 2021-08-13 RX ORDER — LOPERAMIDE HYDROCHLORIDE 2 MG/1
2 CAPSULE ORAL 4 TIMES DAILY PRN
Qty: 30 CAPSULE | Refills: 0 | Status: SHIPPED | OUTPATIENT
Start: 2021-08-13 | End: 2022-03-31 | Stop reason: ALTCHOICE

## 2021-08-13 RX ORDER — MULTIVITAMIN/IRON/FOLIC ACID 18MG-0.4MG
1 TABLET ORAL DAILY
COMMUNITY

## 2021-08-13 NOTE — PROGRESS NOTES
Assessment/Plan:     Diagnoses and all orders for this visit:    Diarrhea, unspecified type  No red flag symptoms  Likely 2nd to recent augmentin use  Encouraged and educated on healthy diet with pre and probiotics  Discussed increasing fiber and use of Imodium  May consider testing if symptoms perist  -     loperamide (IMODIUM) 2 mg capsule; Take 1 capsule (2 mg total) by mouth 4 (four) times a day as needed for diarrhea    Thoracic spine dysfunction  Trial of Voltaren gel for recent exacerbation of chronic back pain  -     Diclofenac Sodium (VOLTAREN) 1 %; Apply 2 g topically 4 (four) times a day      Annual visit with PCP scheduled in 2 months        Subjective:    Patient ID: Melany Perez is a 78 y o  female  Pt is presenting today for Frequent watery diarrhea for 9 days  Gradually forming up and mucus stopped few days ago  Pt used Augmentin for 10 days starting on 7/18 - 7/28 for a recent skin infection  Diarrea symptoms started 7 days later  Father diagnosed with colon cancer at age 48  Normal colonoscopy 6/2017 with FU next year  Diarrhea   This is a new problem  The problem occurs 5 to 10 times per day  The stool consistency is described as mucous  The patient states that diarrhea does not awaken her from sleep  Associated symptoms include abdominal pain  Pertinent negatives include no arthralgias, fever or myalgias  She has tried nothing for the symptoms  The following portions of the patient's history were reviewed and updated as appropriate: allergies, current medications and problem list     Review of Systems   Constitutional: Negative for diaphoresis, fatigue, fever and unexpected weight change  Gastrointestinal: Positive for abdominal pain and diarrhea  Musculoskeletal: Negative for arthralgias and myalgias           Objective:  /70   Pulse 72   Temp 98 1 °F (36 7 °C)   Resp 16   Ht 5' 5 25" (1 657 m)   Wt 65 8 kg (145 lb)   BMI 23 94 kg/m²      Physical Exam  Constitutional:       General: She is not in acute distress  Appearance: Normal appearance  She is well-developed  She is not diaphoretic  Eyes:      Pupils: Pupils are equal, round, and reactive to light  Cardiovascular:      Rate and Rhythm: Normal rate and regular rhythm  Heart sounds: Normal heart sounds  No murmur heard  No friction rub  No gallop  Pulmonary:      Effort: Pulmonary effort is normal  No respiratory distress  Breath sounds: Normal breath sounds  No wheezing or rales  Abdominal:      General: There is no distension  Tenderness: There is no abdominal tenderness  Musculoskeletal:      Cervical back: Normal range of motion and neck supple  Neurological:      Mental Status: She is alert and oriented to person, place, and time

## 2021-08-18 ENCOUNTER — OFFICE VISIT (OUTPATIENT)
Dept: PHYSICAL THERAPY | Facility: CLINIC | Age: 66
End: 2021-08-18
Payer: COMMERCIAL

## 2021-08-18 DIAGNOSIS — M75.42 IMPINGEMENT SYNDROME OF LEFT SHOULDER: Primary | ICD-10-CM

## 2021-08-18 PROCEDURE — 97110 THERAPEUTIC EXERCISES: CPT | Performed by: PHYSICAL THERAPIST

## 2021-08-18 PROCEDURE — 97140 MANUAL THERAPY 1/> REGIONS: CPT | Performed by: PHYSICAL THERAPIST

## 2021-08-18 PROCEDURE — 97112 NEUROMUSCULAR REEDUCATION: CPT | Performed by: PHYSICAL THERAPIST

## 2021-08-18 NOTE — PROGRESS NOTES
Daily Note      Today's date: 2021  Patient name: Carolynn Qiu  : 1955  MRN: [id number]  Referring provider: Betito Shirley MD  Dx:   Encounter Diagnosis     ICD-10-CM    1  Impingement syndrome of left shoulder  M75 42               Subjective: patient reports her shoulder has been feeling good      Objective: See treatment diary below  PROM flexion: 164  Abduction: wnl  ER: 94  IR: 70    Assessment: Tolerated treatment well  No pain reported throughout treatment  PROM is full and she is no longer having functional limitations        Plan: discharged to HEP     Precautions: none      Manuals    Manual stretching LEFT SY MC SY SY SY SY    ML   Left scap ROM                                       Neuro Re-Ed                                                    Ther Ex             Row  Blue 30  Blue 30  Blue 30 Black 30 Black 25 Black 25    Blue 30x   Shld ext  Blue 30 Blue 30  Blue 30 Blue 30 Blue 30 Black 25    Blue 30x   Row with ER Blue 30 Blue 30  Blue 30 Blue 30 Blue 30 Black 25    Blue 30x   W's RTB 30 RTB 30 RTB 30 Green 20 Green 20 Green 20    RTB 20x   Band ER             Biceps curls iso             Stool seated roll outs             pball rolls             Thoracic ext over chair 20  hands behind head 20  hands behind head 20  hands behind head 20 hands behind head 20 hands behind head 20 hands behind head    20x   IR iso             Cane AAROM scaption             Prone IL ->T             Table slides scaption             Cross body stretch :10x10 :10x10 :10x10 :10x10 :10x10 :10x10    10" x 10 ea   Table slides ER :10 x 10 ea using stool w/arm on wedge :10x10 ea using stool and wedge :10x10 ea using stool and wedge :10x10 ea using stool and wedge :10x10 ea using stool and wedge :10x10 ea using stool and wedge    10" x 10 ea using stool w/arm on wedge   Sleeper stretch IR :10x5 :10x5 :10x5 :10x5 :10x5 :10x5    10" x 5   Corner stretch, hands just above elbow height             Flexion table slides :10x10 :10x10 :10x10 :10x10 :10x10 :10x10    10" x 10 stool                             Ther Activity                                       Gait Training                                       Modalities

## 2021-08-23 ENCOUNTER — TELEPHONE (OUTPATIENT)
Dept: FAMILY MEDICINE CLINIC | Facility: CLINIC | Age: 66
End: 2021-08-23

## 2021-08-23 NOTE — TELEPHONE ENCOUNTER
Patient informed  States that her stool yesterday was normal  She had 1 watery stool and 1 loose stool this am  Also had 1 soft formed stool today

## 2021-08-23 NOTE — TELEPHONE ENCOUNTER
Patient called stating she is still having symptoms from 08-  She leaves on vacation tomorrow and would like to see if there are any other options  She is requesting a return call  Please advise

## 2021-08-30 ENCOUNTER — TELEPHONE (OUTPATIENT)
Dept: FAMILY MEDICINE CLINIC | Facility: CLINIC | Age: 66
End: 2021-08-30

## 2021-08-30 NOTE — TELEPHONE ENCOUNTER
Patient saw Koko Luna for diarrhea on 8/13/21  She used immodium which helped for a little bit  Started back on regular diet & is having soft stools again  Is there something you can recommend to try at home? She wants message sent to Dr Doroteo Jacobs

## 2021-09-24 ENCOUNTER — TELEPHONE (OUTPATIENT)
Dept: FAMILY MEDICINE CLINIC | Facility: CLINIC | Age: 66
End: 2021-09-24

## 2021-10-05 ENCOUNTER — TELEPHONE (OUTPATIENT)
Dept: FAMILY MEDICINE CLINIC | Facility: CLINIC | Age: 66
End: 2021-10-05

## 2021-10-05 DIAGNOSIS — E78.00 PURE HYPERCHOLESTEROLEMIA: ICD-10-CM

## 2021-10-05 DIAGNOSIS — R73.01 IFG (IMPAIRED FASTING GLUCOSE): Primary | ICD-10-CM

## 2021-10-08 ENCOUNTER — APPOINTMENT (OUTPATIENT)
Dept: LAB | Facility: CLINIC | Age: 66
End: 2021-10-08
Payer: COMMERCIAL

## 2021-10-08 DIAGNOSIS — E78.00 PURE HYPERCHOLESTEROLEMIA: ICD-10-CM

## 2021-10-08 DIAGNOSIS — R73.01 IFG (IMPAIRED FASTING GLUCOSE): ICD-10-CM

## 2021-10-08 LAB
ANION GAP SERPL CALCULATED.3IONS-SCNC: 4 MMOL/L (ref 4–13)
BUN SERPL-MCNC: 10 MG/DL (ref 5–25)
CALCIUM SERPL-MCNC: 9.6 MG/DL (ref 8.3–10.1)
CHLORIDE SERPL-SCNC: 107 MMOL/L (ref 100–108)
CHOLEST SERPL-MCNC: 213 MG/DL (ref 50–200)
CO2 SERPL-SCNC: 29 MMOL/L (ref 21–32)
CREAT SERPL-MCNC: 0.65 MG/DL (ref 0.6–1.3)
GFR SERPL CREATININE-BSD FRML MDRD: 92 ML/MIN/1.73SQ M
GLUCOSE P FAST SERPL-MCNC: 93 MG/DL (ref 65–99)
HDLC SERPL-MCNC: 55 MG/DL
LDLC SERPL CALC-MCNC: 135 MG/DL (ref 0–100)
NONHDLC SERPL-MCNC: 158 MG/DL
POTASSIUM SERPL-SCNC: 4.1 MMOL/L (ref 3.5–5.3)
SODIUM SERPL-SCNC: 140 MMOL/L (ref 136–145)
TRIGL SERPL-MCNC: 113 MG/DL

## 2021-10-08 PROCEDURE — 80048 BASIC METABOLIC PNL TOTAL CA: CPT

## 2021-10-08 PROCEDURE — 36415 COLL VENOUS BLD VENIPUNCTURE: CPT

## 2021-10-08 PROCEDURE — 80061 LIPID PANEL: CPT

## 2021-10-12 PROBLEM — M53.84 THORACIC SPINE DYSFUNCTION: Status: RESOLVED | Noted: 2020-10-06 | Resolved: 2021-10-12

## 2021-10-13 ENCOUNTER — OFFICE VISIT (OUTPATIENT)
Dept: FAMILY MEDICINE CLINIC | Facility: CLINIC | Age: 66
End: 2021-10-13
Payer: COMMERCIAL

## 2021-10-13 VITALS
DIASTOLIC BLOOD PRESSURE: 68 MMHG | TEMPERATURE: 97.8 F | SYSTOLIC BLOOD PRESSURE: 122 MMHG | HEIGHT: 65 IN | BODY MASS INDEX: 23.91 KG/M2 | WEIGHT: 143.5 LBS | RESPIRATION RATE: 16 BRPM | HEART RATE: 62 BPM

## 2021-10-13 DIAGNOSIS — D22.30 MULTIPLE BENIGN NEVI OF FACE: ICD-10-CM

## 2021-10-13 DIAGNOSIS — G25.81 RESTLESS LEGS: ICD-10-CM

## 2021-10-13 DIAGNOSIS — E78.00 PURE HYPERCHOLESTEROLEMIA: ICD-10-CM

## 2021-10-13 DIAGNOSIS — R73.01 IFG (IMPAIRED FASTING GLUCOSE): ICD-10-CM

## 2021-10-13 DIAGNOSIS — Z12.31 ENCOUNTER FOR SCREENING MAMMOGRAM FOR BREAST CANCER: ICD-10-CM

## 2021-10-13 DIAGNOSIS — Z00.00 MEDICARE ANNUAL WELLNESS VISIT, SUBSEQUENT: Primary | ICD-10-CM

## 2021-10-13 DIAGNOSIS — Z23 ENCOUNTER FOR IMMUNIZATION: ICD-10-CM

## 2021-10-13 DIAGNOSIS — L57.0 ACTINIC KERATOSES: ICD-10-CM

## 2021-10-13 DIAGNOSIS — L29.9 PRURITUS: ICD-10-CM

## 2021-10-13 DIAGNOSIS — L82.1 SK (SEBORRHEIC KERATOSIS): ICD-10-CM

## 2021-10-13 PROCEDURE — G0008 ADMIN INFLUENZA VIRUS VAC: HCPCS

## 2021-10-13 PROCEDURE — G0439 PPPS, SUBSEQ VISIT: HCPCS | Performed by: FAMILY MEDICINE

## 2021-10-13 PROCEDURE — 99214 OFFICE O/P EST MOD 30 MIN: CPT | Performed by: FAMILY MEDICINE

## 2021-10-13 PROCEDURE — 90662 IIV NO PRSV INCREASED AG IM: CPT

## 2021-12-21 ENCOUNTER — OFFICE VISIT (OUTPATIENT)
Dept: FAMILY MEDICINE CLINIC | Facility: CLINIC | Age: 66
End: 2021-12-21
Payer: COMMERCIAL

## 2021-12-21 VITALS
OXYGEN SATURATION: 99 % | DIASTOLIC BLOOD PRESSURE: 80 MMHG | HEIGHT: 65 IN | TEMPERATURE: 98.3 F | SYSTOLIC BLOOD PRESSURE: 122 MMHG | WEIGHT: 140.5 LBS | HEART RATE: 77 BPM | BODY MASS INDEX: 23.41 KG/M2

## 2021-12-21 DIAGNOSIS — F41.1 GAD (GENERALIZED ANXIETY DISORDER): Primary | ICD-10-CM

## 2021-12-21 PROCEDURE — 99214 OFFICE O/P EST MOD 30 MIN: CPT | Performed by: PHYSICIAN ASSISTANT

## 2021-12-21 RX ORDER — ESCITALOPRAM OXALATE 5 MG/1
5 TABLET ORAL DAILY
Qty: 60 TABLET | Refills: 0 | Status: SHIPPED | OUTPATIENT
Start: 2021-12-21 | End: 2022-02-18 | Stop reason: SDUPTHER

## 2022-02-18 DIAGNOSIS — F41.1 GAD (GENERALIZED ANXIETY DISORDER): ICD-10-CM

## 2022-02-18 RX ORDER — ESCITALOPRAM OXALATE 5 MG/1
5 TABLET ORAL DAILY
Qty: 60 TABLET | Refills: 0 | Status: SHIPPED | OUTPATIENT
Start: 2022-02-18

## 2022-03-31 NOTE — PROGRESS NOTES
FAMILY MEDICINE PROGRESS NOTE    Date of Service: 22  Primary Care Provider:   Gisselle Ocampo MD       Name: Lavonne Forde       : 1955       Age:66 y o  Sex: female      MRN: [id number]      Chief Complaint:Thumb Pain (right thumb), Breast Pain (right breast ), and Cerumen Impaction (bilateral ear wax)       ASSESSMENT and PLAN:  Lavonne Forde is a 78 y o  female with:     Problem List Items Addressed This Visit        Nervous and Auditory    Bilateral impacted cerumen     Continue home drops, try warm water while in shower  Return for lavage if home treatments not helpful            Other    CARLIE (generalized anxiety disorder) - Primary     She was recently under a lot stress, but her stress level has improved and she would like to come off of Lexapro  Reviewed taper instructions and possible side effects         Right-sided chest wall pain     Not a new problem, recommended supportive care           Other Visit Diagnoses     Ganglion cyst of finger of right hand        Relevant Orders    Hand/upper extremity injection        Hand/upper extremity injection  Universal Protocol:  Consent: Verbal consent obtained  Risks and benefits: risks, benefits and alternatives were discussed  Consent given by: patient  Time out: Immediately prior to procedure a "time out" was called to verify the correct patient, procedure, equipment, support staff and site/side marked as required  Patient understanding: patient states understanding of the procedure being performed  Patient consent: the patient's understanding of the procedure matches consent given  Procedure consent: procedure consent matches procedure scheduled  Relevant documents: relevant documents not present or verified  Test results: test results not available  Site marked: the operative site was marked  Radiology Images displayed and confirmed   If images not available, report reviewed: imaging studies not available  Required items: required blood products, implants, devices, and special equipment available  Patient identity confirmed: verbally with patient    Supporting Documentation  Indications: therapeutic   Procedure Details  Condition comment: Ganglion cyst on lateral aspect of dorsal PIP of right thumb   Preparation: Patient was prepped and draped in the usual sterile fashion  Needle size: 22 G  Ultrasound guidance: no  Approach: medial  Aspirate amount: 0 5 mL  Aspirate: blood-tinged (gelatinous material)    Patient tolerance: patient tolerated the procedure well with no immediate complications  Dressing:  Sterile dressing applied    Prepped area, 0 5 cc 1% lidocaine injected at base of cyst for anesthesia   Aspirated ganglion cyst over lateral aspect of dorsal PIP on right thumb  Aspirated about 0 5-0 7 mL of gelatinous materia via syringe and manually milking the cyst         SUBJECTIVE:  Kathia Jiang is a 78 y o  female who presents today with a chief complaint of Thumb Pain (right thumb), Breast Pain (right breast ), and Cerumen Impaction (bilateral ear wax)  HPI       Side Pain    She has presented frequently with right sided breast pain  In the last year and a half she has been in PT for rotator cuff dysfunction as well as thoracic spine dysfunction  She also has history of costochondritis  She presents again with right side pain that radiates from about the midaxillary line into her right breast  She reports that the notices it most when getting up in the morning or after laying down on her right side  She also noticed it while painting a closest  She also notices it while driving  She only rates the pain as a three  No treatments tried  She is up to date on her mammogram, she has not noticed any breast lumps  Cyst  She has ganglion cyst on her right thumb  It has gotten larger  It bothers her while driving  Review of Systems   HENT: Positive for hearing loss  Respiratory: Negative for shortness of breath  Musculoskeletal: Positive for arthralgias (chest wall pain)  Cyst on thumb     I have reviewed the patient's Past Medical History  Current Outpatient Medications:     BIOTIN 5000 PO, Take 1 tablet by mouth daily, Disp: , Rfl:     Cholecalciferol (Vitamin D3) 50 MCG (2000 UT) capsule, Take 2,000 Units by mouth daily, Disp: , Rfl:     Diclofenac Sodium (VOLTAREN) 1 %, Apply 2 g topically 4 (four) times a day, Disp: 350 g, Rfl: 0    escitalopram (LEXAPRO) 5 mg tablet, Take 1 tablet (5 mg total) by mouth daily, Disp: 60 tablet, Rfl: 0    ferrous sulfate 325 (65 Fe) mg tablet, Take 1 tablet by mouth 2 (two) times a week  , Disp: , Rfl:     multivitamin-minerals (CENTRUM ADULTS) tablet, Take 1 tablet by mouth daily CENTRUM  MINI'S, Disp: , Rfl:     Omega-3 1000 MG CAPS, Take 1,290 mg by mouth daily, Disp: , Rfl:     Cyanocobalamin (BL VITAMIN B-12 PO), Take 1 tablet by mouth daily (Patient not taking: Reported on 12/21/2021 ), Disp: , Rfl:     OBJECTIVE:  /78 (BP Location: Left arm, Patient Position: Sitting, Cuff Size: Standard)   Pulse 68   Temp (!) 96 1 °F (35 6 °C)   Resp 18   Ht 5' 4" (1 626 m)   Wt 66 2 kg (146 lb)   SpO2 98%   Breastfeeding No   BMI 25 06 kg/m²    BP Readings from Last 3 Encounters:   04/01/22 140/78   12/21/21 122/80   10/13/21 122/68      Wt Readings from Last 3 Encounters:   04/01/22 66 2 kg (146 lb)   12/21/21 63 7 kg (140 lb 8 oz)   10/13/21 65 1 kg (143 lb 8 oz)      Physical Exam  Constitutional:       General: She is not in acute distress  Appearance: Normal appearance  She is normal weight  She is not ill-appearing or toxic-appearing  HENT:      Head: Normocephalic and atraumatic  Right Ear: External ear normal  There is impacted cerumen  Left Ear: External ear normal  There is impacted cerumen  Nose: Nose normal  No congestion        Mouth/Throat:      Mouth: Mucous membranes are moist    Eyes:      Extraocular Movements: Extraocular movements intact  Conjunctiva/sclera: Conjunctivae normal    Cardiovascular:      Rate and Rhythm: Normal rate and regular rhythm  Pulmonary:      Effort: Pulmonary effort is normal  No respiratory distress  Breath sounds: Normal breath sounds  No stridor  No wheezing, rhonchi or rales  Chest:      Chest wall: No mass, lacerations, deformity, swelling, tenderness, crepitus or edema  There is no dullness to percussion  Breasts:      Right: No swelling, bleeding, inverted nipple, mass, nipple discharge, skin change, tenderness or supraclavicular adenopathy  Musculoskeletal:         General: Normal range of motion  Right hand: Deformity (gangion cyst on thumb) present  Cervical back: Normal range of motion and neck supple  No rigidity  Lymphadenopathy:      Upper Body:      Right upper body: No supraclavicular or pectoral adenopathy  Skin:     Findings: No erythema or rash  Neurological:      General: No focal deficit present  Mental Status: She is alert and oriented to person, place, and time  Psychiatric:         Mood and Affect: Mood normal          Behavior: Behavior normal                 Return in about 6 months (around 10/1/2022) for AWV in October  Yohannes Hannon MD    Note: Portions of the record have been created with voice recognition software  Occasional wrong word or "sound a like" substitutions may have occurred due to the inherent limitations of voice recognition software  Read the chart carefully and recognize, using context, where substitutions have occurred

## 2022-04-01 ENCOUNTER — OFFICE VISIT (OUTPATIENT)
Dept: FAMILY MEDICINE CLINIC | Facility: CLINIC | Age: 67
End: 2022-04-01
Payer: COMMERCIAL

## 2022-04-01 VITALS
HEART RATE: 68 BPM | BODY MASS INDEX: 24.92 KG/M2 | RESPIRATION RATE: 18 BRPM | WEIGHT: 146 LBS | HEIGHT: 64 IN | OXYGEN SATURATION: 98 % | TEMPERATURE: 96.1 F | SYSTOLIC BLOOD PRESSURE: 140 MMHG | DIASTOLIC BLOOD PRESSURE: 78 MMHG

## 2022-04-01 DIAGNOSIS — R07.89 RIGHT-SIDED CHEST WALL PAIN: ICD-10-CM

## 2022-04-01 DIAGNOSIS — F41.1 GAD (GENERALIZED ANXIETY DISORDER): Primary | ICD-10-CM

## 2022-04-01 DIAGNOSIS — H61.23 BILATERAL IMPACTED CERUMEN: ICD-10-CM

## 2022-04-01 DIAGNOSIS — M67.441 GANGLION CYST OF FINGER OF RIGHT HAND: ICD-10-CM

## 2022-04-01 PROCEDURE — 99214 OFFICE O/P EST MOD 30 MIN: CPT | Performed by: FAMILY MEDICINE

## 2022-04-01 NOTE — ASSESSMENT & PLAN NOTE
She was recently under a lot stress, but her stress level has improved and she would like to come off of Lexapro   Reviewed taper instructions and possible side effects

## 2022-04-01 NOTE — ASSESSMENT & PLAN NOTE
Continue home drops, try warm water while in shower  Return for lavage if home treatments not helpful

## 2022-04-01 NOTE — PATIENT INSTRUCTIONS
To taper off of Lexapro, take 2 5 mg (1/2 tablet) for 1 week then stop, if you cannot cut in half take 5 mg every other day for 1 week then stop

## 2022-05-13 ENCOUNTER — TELEMEDICINE (OUTPATIENT)
Dept: FAMILY MEDICINE CLINIC | Facility: CLINIC | Age: 67
End: 2022-05-13
Payer: COMMERCIAL

## 2022-05-13 DIAGNOSIS — M67.441 GANGLION CYST OF FINGER OF RIGHT HAND: ICD-10-CM

## 2022-05-13 DIAGNOSIS — R05.8 POST-VIRAL COUGH SYNDROME: ICD-10-CM

## 2022-05-13 DIAGNOSIS — Z12.11 COLON CANCER SCREENING: ICD-10-CM

## 2022-05-13 DIAGNOSIS — R05.9 COUGH: Primary | ICD-10-CM

## 2022-05-13 PROCEDURE — 99213 OFFICE O/P EST LOW 20 MIN: CPT | Performed by: FAMILY MEDICINE

## 2022-05-13 RX ORDER — FLUTICASONE PROPIONATE 50 MCG
1 SPRAY, SUSPENSION (ML) NASAL DAILY
Qty: 11.1 ML | Refills: 1 | Status: SHIPPED | OUTPATIENT
Start: 2022-05-13

## 2022-05-13 NOTE — PROGRESS NOTES
Virtual Regular Visit    Verification of patient location: [place]  Patient is located in the following state in which I hold an active license [place]      Assessment/Plan:    Problem List Items Addressed This Visit    None     Visit Diagnoses     Cough    -  Primary    Relevant Medications    fluticasone (FLONASE) 50 mcg/act nasal spray    Colon cancer screening        Relevant Orders    Ambulatory referral for colonoscopy    Ganglion cyst of finger of right hand        Relevant Orders    Ambulatory Referral to Hand Surgery    Post-viral cough syndrome            Symptoms of cough likely post viral cough syndrome, possible component of post nasal drip  Recommended trial of flonase, continue albuterol as needed, other supportive care  Return if symptoms persist beyond 6 weeks  Reason for visit is   Chief Complaint   Patient presents with    Virtual Regular Visit        Encounter provider Gisselle Ocampo MD    Provider located at [address] [phone]      Recent Visits  No visits were found meeting these conditions  Showing recent visits within past 7 days and meeting all other requirements  Today's Visits  Date Type Provider Dept   05/13/22 Edvin Tanner MD [facility]   Showing today's visits and meeting all other requirements  Future Appointments  No visits were found meeting these conditions  Showing future appointments within next 150 days and meeting all other requirements       The patient was identified by name and date of birth  Lavonne Forde was informed that this is a telemedicine visit and that the visit is being conducted through [facility] and patient was informed that this is a secure, HIPAA-compliant platform  She agrees to proceed     My office door was closed  No one else was in the room  She acknowledged consent and understanding of privacy and security of the video platform   The patient has agreed to participate and understands they can discontinue the visit at any time  Patient is aware this is a billable service  Subjective  Wanda Acosta is a 75 y o  female who presents for virtual sick visit   HPI     She reports that symptom started about 2 weeks ago with sore throat and headache  She also had some nausea and vomiting  Her headache improved following episode of emesis  She reports she also felt feverish and chilled, these resolved within 5 days of onset  She reports these symptoms have improved, but she now has a cough which started about 1 week ago  The cough is dry is not persistent  She reports that she coughs more in the morning  She denies feeling congestion, having rhinorrhea  She denies shortness of breath, chest tightness  she reports normal O2 saturation  She has been drinking tea with honey  She had similar symptoms about 2 1/2 years following a viral illness and was treated with albuterol inhaler  She did use this inhaler x1 and noticed improvement in her symptoms  She also reports that cyst on hand has returned  She would like to see hang surgeon for consideration of removal      Past Medical History:   Diagnosis Date    Adjustment disorder with anxious mood 02/07/2006    last assessed - [id number]    Degeneration of cervical intervertebral disc 02/07/2006    Dysfunctional uterine bleeding 02/07/2006    last assessed - [id number]    Essential hypertension     last assessed - [id number]    Menopause 02/07/2006    last assessed - [id number]    PAC (premature atrial contraction)     last assessed - [id number]    Polyp of sigmoid colon        Past Surgical History:   Procedure Laterality Date    TONSILLECTOMY         Current Outpatient Medications   Medication Sig Dispense Refill    fluticasone (FLONASE) 50 mcg/act nasal spray 1 spray into each nostril in the morning   11 1 mL 1    BIOTIN 5000 PO Take 1 tablet by mouth daily      Cholecalciferol (Vitamin D3) 50 MCG (2000 UT) capsule Take 2,000 Units by mouth daily      Cyanocobalamin (BL VITAMIN B-12 PO) Take 1 tablet by mouth daily (Patient not taking: Reported on 12/21/2021 )      Diclofenac Sodium (VOLTAREN) 1 % Apply 2 g topically 4 (four) times a day 350 g 0    escitalopram (LEXAPRO) 5 mg tablet Take 1 tablet (5 mg total) by mouth daily 60 tablet 0    ferrous sulfate 325 (65 Fe) mg tablet Take 1 tablet by mouth 2 (two) times a week        multivitamin-minerals (CENTRUM ADULTS) tablet Take 1 tablet by mouth daily CENTRUM  MINI'S      Omega-3 1000 MG CAPS Take 1,290 mg by mouth daily       No current facility-administered medications for this visit  No Known Allergies    Review of Systems   Constitutional: Positive for fatigue  Negative for chills and fever  HENT: Negative for congestion, postnasal drip and rhinorrhea  Respiratory: Positive for cough  Negative for chest tightness and shortness of breath  Musculoskeletal:        Ganglion cyst       Video Exam    There were no vitals filed for this visit  Physical Exam  Constitutional:       General: She is not in acute distress  Appearance: Normal appearance  She is not ill-appearing or toxic-appearing  HENT:      Head: Normocephalic and atraumatic  Nose: Congestion present  Mouth/Throat:      Mouth: Mucous membranes are moist    Eyes:      Extraocular Movements: Extraocular movements intact  Conjunctiva/sclera: Conjunctivae normal    Pulmonary:      Effort: Pulmonary effort is normal       Comments: Able to speak in complete sentences without difficulty   Musculoskeletal:      Cervical back: Normal range of motion  No rigidity  Neurological:      Mental Status: She is alert             I spent 16 minutes with patient today in which greater than 50% of the time was spent in counseling/coordination of care regarding impressions, instructions for managment    VIRTUAL VISIT Дмитрий Flynn 88  verbally agrees to participate in [employer]  Pt is aware that [employer] could be limited without vital signs or the ability to perform a full hands-on physical exam  Shanna Stewart understands she or the provider may request at any time to terminate the video visit and request the patient to seek care or treatment in person

## 2022-05-17 ENCOUNTER — TELEPHONE (OUTPATIENT)
Dept: OBGYN CLINIC | Facility: HOSPITAL | Age: 67
End: 2022-05-17

## 2022-05-17 NOTE — TELEPHONE ENCOUNTER
Pt called to f/u on hand appointment requested this morning  Explained that it would take longer to respond so she requested to be called today after 4:00  Her work schedule varies and would need to speak to the  directly    Best contact [phone]  Thank you

## 2022-05-17 NOTE — TELEPHONE ENCOUNTER
Hello,    Please advise if the following patient can be forced onto the schedule:    Patient:  Elo Stewart  : 1955  MRN:  [id number]  Call back: [phone]  Reason for appointment:   r thumb ganglion cyst - PCP lanced it but now states it needs to be surgically removed    Requested doctor/location: Patient is requesting Dr Darío Ovalles if possible      EMAILED E

## 2022-07-13 ENCOUNTER — OFFICE VISIT (OUTPATIENT)
Dept: OBGYN CLINIC | Facility: CLINIC | Age: 67
End: 2022-07-13
Payer: COMMERCIAL

## 2022-07-13 ENCOUNTER — APPOINTMENT (OUTPATIENT)
Dept: RADIOLOGY | Facility: AMBULARY SURGERY CENTER | Age: 67
End: 2022-07-13
Attending: SURGERY
Payer: COMMERCIAL

## 2022-07-13 VITALS
SYSTOLIC BLOOD PRESSURE: 122 MMHG | HEIGHT: 64 IN | HEART RATE: 73 BPM | RESPIRATION RATE: 16 BRPM | DIASTOLIC BLOOD PRESSURE: 78 MMHG | BODY MASS INDEX: 24.65 KG/M2 | WEIGHT: 144.4 LBS

## 2022-07-13 DIAGNOSIS — M79.644 PAIN OF RIGHT THUMB: ICD-10-CM

## 2022-07-13 DIAGNOSIS — M67.441 GANGLION CYST OF FINGER OF RIGHT HAND: Primary | ICD-10-CM

## 2022-07-13 PROCEDURE — 73140 X-RAY EXAM OF FINGER(S): CPT

## 2022-07-13 PROCEDURE — 99203 OFFICE O/P NEW LOW 30 MIN: CPT | Performed by: SURGERY

## 2022-07-13 NOTE — PROGRESS NOTES
Nikolay OCHOA  Attending, Orthopaedic Surgery  Hand, Wrist, and Elbow Surgery  [facility]       ORTHOPAEDIC HAND, WRIST, AND ELBOW OFFICE  VISIT       ASSESSMENT/PLAN:    13-year-old RHD female with mucoid cyst of right thumb IP joint    Reviewed today's physical exam findings and x-ray findings with patient at time of visit  Discussed repeat draining of the cyst, but educated patient that due to the underlying arthritis, this may continue to reconstitute itself  As long as she is not having any pain, or experiencing a loss of function with the cyst, is essentially benign and she can allow her body to resolve it on its own  She can take anti-inflammatory medication OTC to see if it is helpful  At this time she feels that her symptoms did warrant BP draining or surgical intervention  She can continue activities as tolerated without limitations or restrictions  Should her symptoms worsen, or should any questions or concerns arise in the future, she can contact the office to schedule appointment for re-evaluation, otherwise she does not need to schedule for follow-up at this time  Patient expresses understanding of and agreed with this treatment plan  The patient verbalized understanding of exam findings and treatment plan  We engaged in the shared decision-making process and treatment options were discussed at length with the patient  Surgical and conservative management discussed today along with risks and benefits  Diagnoses and all orders for this visit:    Ganglion cyst of finger of right hand  -     Ambulatory Referral to Hand Surgery    Pain of right thumb  -     XR thumb right first digit-min 2v; Future        Follow Up:  No follow-ups on file  To Do Next Visit:  Re-evaluation of current issue      General Discussions:  Ganglion Cysts: The anatomy and physiology of the ganglion was discussed with the patient today in the office    Fluid leaking out of the joint surface typically creates a small sac, which can enlarge and cause pain or limitation of motion  Treatment options include observation, aspiration, or surgical incision were discussed with the patient today  Observation typically lead to resolution and approximately 10% of patients, aspiration results in resolution of approximately 50% of patients, and surgical excision leads to resolution in approximately 97% of patients  After discussion with the patient today, the patient voiced understanding of all treatment options  ____________________________________________________________________________________________________________________________________________      CHIEF COMPLAINT:  Chief Complaint   Patient presents with    Right Thumb - Pain       SUBJECTIVE:  Davida Geiger is a 75y o  year old RHD female who presents for evaluation of cyst of right thumb  Patient states that she has had deformity of the joint for approximately 7+ months  She denies any specific incident of injury  She denies any associated pain, but notes waxing and waning swelling of the thumb IP joint  She states that this typicall coincides with volume of use  She was seen by her primary care physician who drained the cyst, but she said it recurred within days  She admits to self [place] assist and draining it repeatedly over the course of 2 weeks, but when the cyst kept reconstituting itself, she made the appointment to be seen in hand  On today's presentation she states that this is is still present, but is not as large as it sometimes gets  She denies any associated bruising, numbness, tingling, or mechanical symptoms          Pain/symptom timing:  Worse during the day when active  Pain/symptom context:  Worse with activites and work  Pain/symptom modifying factors:  Rest makes better, activities make worse  Pain/symptom associated signs/symptoms: none    Prior treatment   · NSAIDsNo   · Injections No · Bracing/Orthotics No    Physical Therapy No     I have personally reviewed all the relevant PMH, PSH, SH, FH, Medications and allergies      PAST MEDICAL HISTORY:  Past Medical History:   Diagnosis Date    Adjustment disorder with anxious mood 02/07/2006    last assessed - [id number]    Degeneration of cervical intervertebral disc 02/07/2006    Dysfunctional uterine bleeding 02/07/2006    last assessed - [id number]    Essential hypertension     last assessed - [id number]    Menopause 02/07/2006    last assessed - [id number]    PAC (premature atrial contraction)     last assessed - [id number]    Polyp of sigmoid colon        PAST SURGICAL HISTORY:  Past Surgical History:   Procedure Laterality Date    COLONOSCOPY      TONSILLECTOMY         FAMILY HISTORY:  Family History   Problem Relation Age of Onset    Coronary artery disease Mother     Arthritis Mother     Atrial fibrillation Mother     Stroke Mother         cerebrovascular accident    Diabetes Mother     Colon cancer Mother     Cancer Father     Colon cancer Father     Coronary aneurysm Brother     Coronary artery disease Brother        SOCIAL HISTORY:  Social History     Tobacco Use    Smoking status: Never Smoker    Smokeless tobacco: Never Used   Substance Use Topics    Alcohol use:  Yes     Alcohol/week: 1 0 standard drink     Types: 1 Glasses of wine per week     Comment: Social     Drug use: No       MEDICATIONS:    Current Outpatient Medications:     BIOTIN 5000 PO, Take 1 tablet by mouth daily, Disp: , Rfl:     Cholecalciferol (Vitamin D3) 50 MCG (2000 UT) capsule, Take 2,000 Units by mouth daily, Disp: , Rfl:     Diclofenac Sodium (VOLTAREN) 1 %, Apply 2 g topically 4 (four) times a day, Disp: 350 g, Rfl: 0    fluticasone (FLONASE) 50 mcg/act nasal spray, 1 spray into each nostril in the morning , Disp: 11 1 mL, Rfl: 1    multivitamin-minerals (CENTRUM ADULTS) tablet, Take 1 tablet by mouth daily CENTRUM  MINI'S, Disp: , Rfl:   Omega-3 1000 MG CAPS, Take 1,290 mg by mouth daily, Disp: , Rfl:     Cyanocobalamin (BL VITAMIN B-12 PO), Take 1 tablet by mouth daily (Patient not taking: No sig reported), Disp: , Rfl:     escitalopram (LEXAPRO) 5 mg tablet, Take 1 tablet (5 mg total) by mouth daily (Patient not taking: Reported on 7/13/2022), Disp: 60 tablet, Rfl: 0    ferrous sulfate 325 (65 Fe) mg tablet, Take 1 tablet by mouth 2 (two) times a week   (Patient not taking: Reported on 7/13/2022), Disp: , Rfl:     ALLERGIES:  No Known Allergies        REVIEW OF SYSTEMS:  Review of Systems   Constitutional: Negative for chills, fever and unexpected weight change  HENT: Negative for hearing loss, nosebleeds and sore throat  Eyes: Negative for pain, redness and visual disturbance  Respiratory: Negative for cough, shortness of breath and wheezing  Cardiovascular: Negative for chest pain, palpitations and leg swelling  Gastrointestinal: Negative for abdominal pain, nausea and vomiting  Endocrine: Negative for polydipsia and polyuria  Genitourinary: Negative for dysuria and hematuria  Musculoskeletal:        As noted in HPI   Skin: Negative for rash and wound  Neurological: Negative for dizziness, numbness and headaches  Psychiatric/Behavioral: Negative for decreased concentration and suicidal ideas  The patient is not nervous/anxious          VITALS:  Vitals:    07/13/22 0856   BP: 122/78   Pulse: 73   Resp: 16       LABS:  HgA1c:   Lab Results   Component Value Date    HGBA1C 5 3 11/05/2019     BMP:   Lab Results   Component Value Date    GLUCOSE 87 03/20/2017    CALCIUM 9 6 10/08/2021     03/20/2017    K 4 1 10/08/2021    CO2 29 10/08/2021     10/08/2021    BUN 10 10/08/2021    CREATININE 0 65 10/08/2021       _____________________________________________________  PHYSICAL EXAMINATION:  General: well developed and well nourished, alert, oriented times 3 and appears comfortable  Psychiatric: Normal  HEENT: Normocephalic, Atraumatic Trachea Midline, No torticollis  Pulmonary: No audible wheezing or respiratory distress   Abdomen/GI: Non tender, non distended   Cardiovascular: No pitting edema, 2+ radial pulse   Skin: Ganglion of right thumb, No Erythema, No Lacerations, No Fluctuation, No Ulcerations  Neurovascular: Sensation Intact to the Median, Ulnar, Radial Nerve, Motor Intact to the Median, Ulnar, Radial Nerve and Pulses Intact  Musculoskeletal: Normal, except as noted in detailed exam and in HPI  MUSCULOSKELETAL EXAMINATION:  Right hand/thumb -   Patient presents with dorsal radial cyst of thumb IP joint  Skin is warm and dry to touch with no signs of erythema, ecchymosis, infection  No soft tissue swelling   Flexor and extensor mechanisms are intact   No rotational deformity with composite finger flexion  No TTP on exam  No Reproducible triggering on exam  Demonstrates normal wrist, elbow, and shoulder motion  Forearm compartments are soft and supple  2+ distal radial pulse with brisk capillary refill to the fingers  Radial, median, and ulnar motor and sensory distributions intact  Sensation light touch intact distally    ___________________________________________________  STUDIES REVIEWED:  Attending Physician has personally reviewed pertinent imaging in PACS, impression is as follows:    Review of radiographic series taken 7/13/2022 of the right thumb shows osteoarthritis changes of the thumb IP joint            PROCEDURES PERFORMED:  Procedures  No Procedures performed today    _____________________________________________________      [place]    I,:  Hunter Hughes am acting as a scribe while in the presence of the attending physician :       I,:  Danielle Donahue MD personally performed the services described in this documentation    as scribed in my presence :

## 2022-07-14 ENCOUNTER — TELEPHONE (OUTPATIENT)
Dept: ADMINISTRATIVE | Facility: OTHER | Age: 67
End: 2022-07-14

## 2022-07-14 NOTE — TELEPHONE ENCOUNTER
----- Message from Pam Martinez, [address] [place] sent at 7/14/2022  9:45 AM EDT -----  Regarding: Mammogram  07/14/22 9:45 AM    Hello, our patient Kirstin Gonzalez has had Mammogram completed/performed  Please assist in updating the patient chart by [facility]  The date of service is 10/19/2021       Thank you,  ATUL Larios PG

## 2022-07-20 NOTE — TELEPHONE ENCOUNTER
Upon review of the In Basket request we were able to locate, review, and update the patient chart as requested for Mammogram     Any additional questions or concerns should be emailed to the Practice Liaisons via [email or web address] email, please do not reply via In Basket      Thank you  Christian Swanson MA

## 2022-08-18 ENCOUNTER — OFFICE VISIT (OUTPATIENT)
Dept: FAMILY MEDICINE CLINIC | Facility: CLINIC | Age: 67
End: 2022-08-18
Payer: COMMERCIAL

## 2022-08-18 VITALS
BODY MASS INDEX: 24.59 KG/M2 | SYSTOLIC BLOOD PRESSURE: 122 MMHG | DIASTOLIC BLOOD PRESSURE: 82 MMHG | HEART RATE: 77 BPM | HEIGHT: 64 IN | WEIGHT: 144 LBS | OXYGEN SATURATION: 99 % | TEMPERATURE: 97.9 F

## 2022-08-18 DIAGNOSIS — H61.23 BILATERAL IMPACTED CERUMEN: Primary | ICD-10-CM

## 2022-08-18 PROCEDURE — 69209 REMOVE IMPACTED EAR WAX UNI: CPT | Performed by: FAMILY MEDICINE

## 2022-08-18 NOTE — PROGRESS NOTES
Ear cerumen removal    Date/Time: 8/18/2022 11:30 AM  Performed by: Rayshawn Curtis MD  Authorized by: Rayshawn Curtis MD   Universal Protocol:  Consent given by: patient  Patient identity confirmed: verbally with patient      Patient location:  Clinic  Procedure details:     Location:  L ear and R ear    Procedure type: irrigation only      Approach:  Natural orifice  Post-procedure details:     Complication:  None    Hearing quality:  Improved    Patient tolerance of procedure:   Tolerated well, no immediate complications

## 2022-12-12 ENCOUNTER — NEW REFERRAL (OUTPATIENT)
Dept: URBAN - METROPOLITAN AREA CLINIC 23 | Facility: CLINIC | Age: 67
End: 2022-12-12

## 2022-12-12 DIAGNOSIS — H35.413: ICD-10-CM

## 2022-12-12 DIAGNOSIS — H43.813: ICD-10-CM

## 2022-12-12 DIAGNOSIS — H35.371: ICD-10-CM

## 2022-12-12 PROCEDURE — 99204 OFFICE O/P NEW MOD 45 MIN: CPT

## 2022-12-12 PROCEDURE — 92250 FUNDUS PHOTOGRAPHY W/I&R: CPT

## 2022-12-12 PROCEDURE — 92134 CPTRZ OPH DX IMG PST SGM RTA: CPT

## 2022-12-12 ASSESSMENT — TONOMETRY
OD_IOP_MMHG: 15
OS_IOP_MMHG: 15

## 2022-12-12 ASSESSMENT — VISUAL ACUITY
OD_CC: 20/20
OS_CC: 20/20

## 2024-01-22 ENCOUNTER — FOLLOW UP (OUTPATIENT)
Dept: URBAN - METROPOLITAN AREA CLINIC 23 | Facility: CLINIC | Age: 69
End: 2024-01-22

## 2024-01-22 DIAGNOSIS — H35.413: ICD-10-CM

## 2024-01-22 DIAGNOSIS — H43.813: ICD-10-CM

## 2024-01-22 DIAGNOSIS — H35.371: ICD-10-CM

## 2024-01-22 PROCEDURE — 92250 FUNDUS PHOTOGRAPHY W/I&R: CPT

## 2024-01-22 PROCEDURE — 92134 CPTRZ OPH DX IMG PST SGM RTA: CPT | Mod: NC

## 2024-01-22 PROCEDURE — 92201 OPSCPY EXTND RTA DRAW UNI/BI: CPT | Mod: NC

## 2024-01-22 PROCEDURE — 92014 COMPRE OPH EXAM EST PT 1/>: CPT

## 2024-01-22 ASSESSMENT — TONOMETRY
OD_IOP_MMHG: 19
OS_IOP_MMHG: 17

## 2024-01-22 ASSESSMENT — VISUAL ACUITY
OD_CC: 20/20
OS_CC: 20/20

## 2025-01-20 ENCOUNTER — FOLLOW UP (OUTPATIENT)
Dept: URBAN - METROPOLITAN AREA CLINIC 23 | Facility: CLINIC | Age: 70
End: 2025-01-20

## 2025-01-20 DIAGNOSIS — H35.371: ICD-10-CM

## 2025-01-20 DIAGNOSIS — H43.813: ICD-10-CM

## 2025-01-20 DIAGNOSIS — H35.413: ICD-10-CM

## 2025-01-20 PROCEDURE — 92134 CPTRZ OPH DX IMG PST SGM RTA: CPT | Mod: NC

## 2025-01-20 PROCEDURE — 92250 FUNDUS PHOTOGRAPHY W/I&R: CPT

## 2025-01-20 PROCEDURE — 92014 COMPRE OPH EXAM EST PT 1/>: CPT

## 2025-01-20 PROCEDURE — 92202 OPSCPY EXTND ON/MAC DRAW: CPT | Mod: NC

## 2025-01-20 ASSESSMENT — TONOMETRY
OS_IOP_MMHG: 11
OD_IOP_MMHG: 11

## 2025-01-20 ASSESSMENT — VISUAL ACUITY
OS_CC: 20/20
OD_CC: 20/20